# Patient Record
Sex: FEMALE | Race: WHITE | NOT HISPANIC OR LATINO | Employment: OTHER | URBAN - METROPOLITAN AREA
[De-identification: names, ages, dates, MRNs, and addresses within clinical notes are randomized per-mention and may not be internally consistent; named-entity substitution may affect disease eponyms.]

---

## 2017-05-24 ENCOUNTER — TRANSCRIBE ORDERS (OUTPATIENT)
Dept: ADMINISTRATIVE | Facility: HOSPITAL | Age: 62
End: 2017-05-24

## 2017-05-24 ENCOUNTER — ALLSCRIPTS OFFICE VISIT (OUTPATIENT)
Dept: OTHER | Facility: OTHER | Age: 62
End: 2017-05-24

## 2017-05-24 DIAGNOSIS — C50.411 MALIGNANT NEOPLASM OF UPPER-OUTER QUADRANT OF RIGHT FEMALE BREAST (HCC): Primary | ICD-10-CM

## 2017-05-24 DIAGNOSIS — M54.40 ACUTE RIGHT-SIDED LOW BACK PAIN WITH SCIATICA, SCIATICA LATERALITY UNSPECIFIED: ICD-10-CM

## 2017-05-24 DIAGNOSIS — E04.1 NONTOXIC UNINODULAR GOITER: ICD-10-CM

## 2017-06-02 ENCOUNTER — HOSPITAL ENCOUNTER (OUTPATIENT)
Dept: MRI IMAGING | Facility: HOSPITAL | Age: 62
Discharge: HOME | End: 2017-06-02
Attending: INTERNAL MEDICINE
Payer: COMMERCIAL

## 2017-06-02 DIAGNOSIS — C50.411 MALIGNANT NEOPLASM OF UPPER-OUTER QUADRANT OF RIGHT FEMALE BREAST (HCC): ICD-10-CM

## 2017-06-02 DIAGNOSIS — M54.50 LOW BACK PAIN: ICD-10-CM

## 2017-06-02 PROCEDURE — 72158 MRI LUMBAR SPINE W/O & W/DYE: CPT

## 2017-06-02 PROCEDURE — A9585 GADOBUTROL INJECTION: HCPCS | Performed by: INTERNAL MEDICINE

## 2017-06-02 RX ADMIN — GADOBUTROL 6 ML: 604.72 INJECTION INTRAVENOUS at 19:55

## 2017-07-05 ENCOUNTER — GENERIC CONVERSION - ENCOUNTER (OUTPATIENT)
Dept: OTHER | Facility: OTHER | Age: 62
End: 2017-07-05

## 2017-11-13 DIAGNOSIS — C50.411 MALIGNANT NEOPLASM OF UPPER-OUTER QUADRANT OF RIGHT FEMALE BREAST (HCC): ICD-10-CM

## 2017-11-13 DIAGNOSIS — E04.1 NONTOXIC SINGLE THYROID NODULE: ICD-10-CM

## 2017-11-16 ENCOUNTER — HOSPITAL ENCOUNTER (OUTPATIENT)
Dept: ULTRASOUND IMAGING | Facility: HOSPITAL | Age: 62
Discharge: HOME | End: 2017-11-16
Attending: INTERNAL MEDICINE
Payer: COMMERCIAL

## 2017-11-16 ENCOUNTER — LAB CONVERSION - ENCOUNTER (OUTPATIENT)
Dept: OTHER | Facility: OTHER | Age: 62
End: 2017-11-16

## 2017-11-16 DIAGNOSIS — E04.1 NONTOXIC UNINODULAR GOITER: ICD-10-CM

## 2017-11-16 LAB — TSH SERPL DL<=0.05 MIU/L-ACNC: 2.49 MIU/L (ref 0.4–4.5)

## 2017-11-16 PROCEDURE — 76536 US EXAM OF HEAD AND NECK: CPT

## 2017-11-17 ENCOUNTER — LAB CONVERSION - ENCOUNTER (OUTPATIENT)
Dept: OTHER | Facility: OTHER | Age: 62
End: 2017-11-17

## 2017-11-17 LAB
A/G RATIO (HISTORICAL): 1.7 (CALC) (ref 1–2.5)
ALBUMIN SERPL BCP-MCNC: 4.2 G/DL (ref 3.6–5.1)
ALP SERPL-CCNC: 92 U/L (ref 33–130)
ALT SERPL W P-5'-P-CCNC: 16 U/L (ref 6–29)
AST SERPL W P-5'-P-CCNC: 16 U/L (ref 10–35)
BASOPHILS # BLD AUTO: 0.5 %
BASOPHILS # BLD AUTO: 33 CELLS/UL (ref 0–200)
BILIRUB SERPL-MCNC: 0.5 MG/DL (ref 0.2–1.2)
BUN SERPL-MCNC: 21 MG/DL (ref 7–25)
BUN/CREA RATIO (HISTORICAL): ABNORMAL (CALC) (ref 6–22)
CA 27.29 (HISTORICAL): 46 U/ML
CALCIUM SERPL-MCNC: 9.3 MG/DL (ref 8.6–10.4)
CHLORIDE SERPL-SCNC: 100 MMOL/L (ref 98–110)
CO2 SERPL-SCNC: 33 MMOL/L (ref 20–31)
CREAT SERPL-MCNC: 0.66 MG/DL (ref 0.5–0.99)
DEPRECATED RDW RBC AUTO: 13.1 % (ref 11–15)
EGFR AFRICAN AMERICAN (HISTORICAL): 110 ML/MIN/1.73M2
EGFR-AMERICAN CALC (HISTORICAL): 95 ML/MIN/1.73M2
EOSINOPHIL # BLD AUTO: 251 CELLS/UL (ref 15–500)
EOSINOPHIL # BLD AUTO: 3.8 %
GAMMA GLOBULIN (HISTORICAL): 2.5 G/DL (CALC) (ref 1.9–3.7)
GLUCOSE (HISTORICAL): 93 MG/DL (ref 65–99)
HCT VFR BLD AUTO: 39.8 % (ref 35–45)
HGB BLD-MCNC: 13.1 G/DL (ref 11.7–15.5)
LYMPHOCYTES # BLD AUTO: 1459 CELLS/UL (ref 850–3900)
LYMPHOCYTES # BLD AUTO: 22.1 %
MCH RBC QN AUTO: 27.9 PG (ref 27–33)
MCHC RBC AUTO-ENTMCNC: 32.9 G/DL (ref 32–36)
MCV RBC AUTO: 84.9 FL (ref 80–100)
MONOCYTES # BLD AUTO: 469 CELLS/UL (ref 200–950)
MONOCYTES (HISTORICAL): 7.1 %
NEUTROPHILS # BLD AUTO: 4389 CELLS/UL (ref 1500–7800)
NEUTROPHILS # BLD AUTO: 66.5 %
PLATELET # BLD AUTO: 261 THOUSAND/UL (ref 140–400)
PMV BLD AUTO: 10.7 FL (ref 7.5–12.5)
POTASSIUM SERPL-SCNC: 4.2 MMOL/L (ref 3.5–5.3)
RBC # BLD AUTO: 4.69 MILLION/UL (ref 3.8–5.1)
SODIUM SERPL-SCNC: 138 MMOL/L (ref 135–146)
T4 FREE SERPL-MCNC: 1.1 NG/DL (ref 0.8–1.8)
TOTAL PROTEIN (HISTORICAL): 6.7 G/DL (ref 6.1–8.1)
TSH SERPL DL<=0.05 MIU/L-ACNC: 2.49 MIU/L (ref 0.4–4.5)
WBC # BLD AUTO: 6.6 THOUSAND/UL (ref 3.8–10.8)

## 2017-11-21 ENCOUNTER — TRANSCRIBE ORDERS (OUTPATIENT)
Dept: ADMINISTRATIVE | Facility: HOSPITAL | Age: 62
End: 2017-11-21

## 2017-11-21 ENCOUNTER — ALLSCRIPTS OFFICE VISIT (OUTPATIENT)
Dept: OTHER | Facility: OTHER | Age: 62
End: 2017-11-21

## 2017-11-21 DIAGNOSIS — C50.411 MALIGNANT NEOPLASM OF UPPER-OUTER QUADRANT OF RIGHT FEMALE BREAST, UNSPECIFIED ESTROGEN RECEPTOR STATUS (HCC): Primary | ICD-10-CM

## 2017-11-22 NOTE — PROGRESS NOTES
Assessment    1  Malignant neoplasm of upper-outer quadrant of right female breast (174 4) (C50 411)   2  Lung nodules (793 19) (R91 8)   3  Thyroid disorder (246 9) (E07 9)    Plan  Malignant neoplasm of upper-outer quadrant of right female breast    · We recommend that you examine your own breasts for lumps and other changes  ;Status:Complete;   Done: [id number]   Ordered; For:Malignant neoplasm of upper-outer quadrant of right female breast; Ordered By:Proothi, Reggie;   · (1) CA 27 29; Status:Active; Requested for:01May2018; Perform:[facility]; VQL:[id number];QGUNGPQ; For:Malignant neoplasm of upper-outer quadrant of right female breast; Ordered By:Proothi, Reggie;   · (1) CBC/PLT/DIFF; Status:Active; Requested for:01May2018; Perform:[facility]; JTY:[id number];VOHEXXO; For:Malignant neoplasm of upper-outer quadrant of right female breast; Ordered By:Proothi, Reggie;   · (1) COMPREHENSIVE METABOLIC PANEL; Status:Active; Requested for:01May2018; Perform:[facility]; HZP:[id number];YXZXLLD;YCDVEYEX of upper-outer quadrant of right female breast; Ordered By:Proothi, Reggie;   · * CT CHEST WO CONTRAST; Status:Need Information - Financial Authorization; Requested for:14May2018 07:45AM;    Perform:[facility]; RTP:[id number]; Last Updated By:Jennifer Hankins; 11/21/2017 8:43:04 AM;Ordered;neoplasm of upper-outer quadrant of right female breast; Ordered By:Proothi, Reggie;   · Follow-up visit in 6 months Evaluation and Treatment  Follow-up  Status: Complete Done: [id number] 08:00AM   Ordered;Malignant neoplasm of upper-outer quadrant of right female breast; Ordered By: [place] Performed:  Due: 41WGS7916; Last Updated By: Cathryn Kraus; 11/21/2017 8:37:18 AM    Discussion/Summary  Discussion Summary: In 2006 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, 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   Patient had right mastectomy and left breast reduction surgery  She did not require adjuvant therapy  She has been running slightly higher than normal tumor marker CA 27 29 level is fluctuating  The highest was 61 in December 2014  This time it is 46  Clinically there is no evidence of metastatic disease  She has stable thyroid nodules and they're being monitored by ultrasound  She had ultrasound of thyroid last week and that report is pending  She has small stable lung nodules being monitored by CT scan and there has not been any change since 2014  Largest nodule is 5 mm  She is a nonsmoker  states she had echocardiogram and stress test recently and she did fine  She also states she had colonoscopy recently and there was no cancer  states she does not have low back pain anymore  Kaitlyn Jack Has some tiredness, minor arthralgia and some anxiety  examination and test results are as recorded and discussed  Clinically stable  Plan is surveillance  Condition discussed and explained  Questions answered  Patient agrees with the treatment plan  Patient has follow-up appointment  discussed eating healthy foods and exercises as tolerated  Counseling Documentation With Imm: The patient was counseled regarding diagnostic results,-- prognosis,-- patient and family education,-- impressions  total time of encounter was 30 minutes-- and-- 20 minutes was spent counseling  Goals and Barriers: The patient has the current Goals: Cure from breast cancer  The patent has the current Barriers: No barriers  Patient's Capacity to Self-Care: Patient is able to Self-Care  Medication SE Review and Pt Understands Tx: The treatment plan was reviewed with the patient/guardian  The patient/guardian understands and agrees with the treatment plan   Self Referrals:   Self Referrals: No   Understands and agrees with treatment plan: The treatment plan was reviewed with the patient/guardian   The patient/guardian understands and agrees with the treatment plan      Chief Complaint  Chief Complaint: Chief Complaint:  The patient presents to the office today with breast cancer  History of Present Illness  HPI: In 2006 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, 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  Patient had right mastectomy and left breast reduction surgery  She did not require adjuvant therapy  She has been running slightly higher than normal tumor marker CA 27 29 level is fluctuating  The highest was 61 in December 2014  This time it is 46  Clinically there is no evidence of metastatic disease  She has stable thyroid nodules and they're being monitored by ultrasound  She had ultrasound of thyroid last week and that report is pending  She has small stable lung nodules being monitored by CT scan and there has not been any change since 2014  Largest nodule is 5 mm  She is a nonsmoker  states she had echocardiogram and stress test recently and she did fine  She also states she had colonoscopy recently and there was no cancer  states she does not have low back pain anymore  Emory Viveros Has some tiredness, minor arthralgia and some anxiety  Review of Systems        Reviewed 14 systems  No new symptoms  Other symptoms as in history of present illness  Complete-Female:  Constitutional: feeling tired, but-- no fever,-- not feeling poorly,-- no chills-- and-- no recent weight loss  Eyes: No complaints of eye pain, no red eyes, no eyesight problems, no discharge, no dry eyes, no itching of eyes  ENT: no complaints of earache, no loss of hearing, no nose bleeds, no nasal discharge, no sore throat, no hoarseness  Cardiovascular: No complaints of slow heart rate, no fast heart rate, no chest pain, no palpitations, no leg claudication, no lower extremity edema  Respiratory: No complaints of shortness of breath, no wheezing, no cough, no SOB on exertion, no orthopnea, no PND    Gastrointestinal: No complaints of abdominal pain, no constipation, no nausea or vomiting, no diarrhea, no bloody stools  Genitourinary: No complaints of dysuria, no incontinence, no pelvic pain, no dysmenorrhea, no vaginal discharge or bleeding  Musculoskeletal: joint stiffness, but-- no arthralgias,-- no joint swelling,-- no limb pain,-- no myalgias-- and-- no limb swelling  Integumentary: No complaints of skin rash or lesions, no itching, no skin wounds, no breast pain or lump  Neurological: No complaints of headache, no confusion, no convulsions, no numbness, no dizziness or fainting, no tingling, no limb weakness, no difficulty walking  Psychiatric: anxiety, but-- no personality change,-- no sleep disturbances,-- no depression-- and-- no emotional problems  Endocrine: No complaints of proptosis, no hot flashes, no muscle weakness, no deepening of the voice, no feelings of weakness  Hematologic/Lymphatic: No complaints of swollen glands, no swollen glands in the neck, does not bleed easily, does not bruise easily  ROS Reviewed:   ROS reviewed  Active Problems    1  Cancer of breast, female (174 9) (C50 919)   2  Low back pain (724 2) (M54 5)   3  Lung nodules (793 19) (R91 8)   4  Malignant neoplasm of upper-outer quadrant of right female breast (174 4) (C50 411)   5  Rectal bleeding (569 3) (K62 5)   6  Thyroid disorder (246 9) (E07 9)   7  Thyroid nodule (241 0) (E04 1)   8  Vitamin D deficiency (268 9) (E55 9)    Past Medical History            Reviewed  No change  Surgical History  Surgical History Reviewed: The surgical history was reviewed and updated today  Family History  Family History    1  No pertinent family history  Family History Reviewed: The family history was reviewed and updated today  Social History     · Never A Smoker  Social History Reviewed: The social history was reviewed and is unchanged  Current Meds   1  Aspirin 81 MG Oral Tablet Chewable; Therapy: (Recorded:19Sep2012) to Recorded   2  Mohan-Mag TABS; Therapy: (Recorded:19Sep2012) to Recorded   3  Curcumin Powder Recorded   4  [employer] Fish Oil CAPS; Therapy: (Recorded:28Dzs1409) to Recorded   5  Multi Complete CAPS; Therapy: (Recorded:53Tpd7664) to Recorded   6  Omega-3 Krill Oil CAPS; Therapy: (Recorded:18Hqz5785) to Recorded   7  Prepopik 10-3 5-12 MG-GM-GM Oral Packet; Therapy: 69BPM6917 to (Evaluate:91Oka1332) Recorded   8  Probiotic Product TABS; Therapy: (Recorded:74Nzj4758) to Recorded   9  Vitamin D 1000 UNIT CAPS; Therapy: (Recorded:19Iwn0246) to Recorded    Allergies    1  No Known Drug Allergies     Reviewed   Vitals  Vital Signs    Recorded: 21Nov2017 08:23AM   Temperature 97 8 F   Heart Rate 99   Respiration 16   Systolic 668   Diastolic 78   Height 5 ft 4 in   Weight 141 lb    BMI Calculated 24 2   BSA Calculated 1 69   O2 Saturation 99           Stable   Physical Exam                              Patient is alert and oriented  She is not in distress     Vital signs are stable  No icterus  No oral thrush  No sores in the mouth     No palpable neck mass  Lung fields are clear to percussion and auscultation  Heart rate is regular  Abdomen soft and nontender  No palpable abdominal mass  No ascites  No edema of ankles  No calf tenderness  No focal neurological deficit  No skin rash     She  goes to her breast cancer surgeon for examination of breasts and imaging studies  She also goes to her gynecologist   No lymphedema  No palpable lymphadenopathy  Good arterial pulses  Performance status 0  No significant change in examination     ECOG 0       Results/Data  (1) CBC/PLT/DIFF [id number] 08:43AM Proothi, Reggie     Test Name Result Flag Reference   WHITE BLOOD CELL COUNT 6 6 Thousand/uL  3 8-10 8   RED BLOOD CELL COUNT 4 69 Million/uL  3 80-5 10   HEMOGLOBIN 13 1 g/dL  11 7-15 5   HEMATOCRIT 39 8 %  35 0-45 0   MCV 84 9 fL  80 0-100 0   MCH 27 9 pg  27 0-33 0   MCHC 32 9 g/dL  32 0-36 0   RDW 13 1 %  11 0-15 0   PLATELET COUNT 626 Thousand/uL  140-400   ABSOLUTE NEUTROPHILS 4389 cells/uL  2982-2533 ABSOLUTE LYMPHOCYTES 1459 cells/uL  850-3900   ABSOLUTE MONOCYTES 469 cells/uL  200-950   ABSOLUTE EOSINOPHILS 251 cells/uL     ABSOLUTE BASOPHILS 33 cells/uL  0-200   NEUTROPHILS 66 5 %     LYMPHOCYTES 22 1 %     MONOCYTES 7 1 %     EOSINOPHILS 3 8 %     BASOPHILS 0 5 %     MPV 10 7 fL  7 5-12 5     (1) COMPREHENSIVE METABOLIC PANEL [id number] 62:13KY Proothi, Reggie     Test Name Result Flag Reference   GLUCOSE 93 mg/dL  65-99   Fasting reference interval   UREA NITROGEN (BUN) 21 mg/dL  7-25   CREATININE 0 66 mg/dL  0 50-0 99     For patients >52years of age, the reference limit for Creatinine is approximately 13% higher for people identified as -American  eGFR NON-AFR  AMERICAN 95 mL/min/1 73m2  > OR = 60   eGFR AFRICAN AMERICAN 110 mL/min/1 73m2  > OR = 60   BUN/CREATININE RATIO   5-83   NOT APPLICABLE (calc)   SODIUM 138 mmol/L  135-146   POTASSIUM 4 2 mmol/L  3 5-5 3   CHLORIDE 100 mmol/L     CARBON DIOXIDE 33 mmol/L H 20-31   CALCIUM 9 3 mg/dL  8 6-10 4   PROTEIN, TOTAL 6 7 g/dL  6 1-8 1   ALBUMIN 4 2 g/dL  3 6-5 1   GLOBULIN 2 5 g/dL (calc)  1 9-3 7   ALBUMIN/GLOBULIN RATIO 1 7 (calc)  1 0-2 5   BILIRUBIN, TOTAL 0 5 mg/dL  0 2-1 2   ALKALINE PHOSPHATASE 92 U/L     AST 16 U/L  10-35   ALT 16 U/L  6-29     (1) CA 27 29 [id number] 08:43AM Proothi, Reggie     REPORT COMMENT: FASTING:YES     Test Name Result Flag Reference   CA27 29 (CHIRON) 46 U/mL H <38     This test was performed using the Siemens          chemiluminescent method  Values obtained from different          assay methods cannot be used interchangeably  CA 27 29          levels, regardless of value, should not be interpreted as          absolute evidence of the presence or absence of disease       (1) T4, FREE [id number] 08:43AM Proothi, Reggie     Test Name Result Flag Reference   T4, FREE 1 1 ng/dL  0 8-1 8     (Q) TSH, 3RD GENERATION [id number] 08:43AM Proothi, Reggie     REPORT COMMENT: FASTING:YES     Test Name Result Flag Reference   TSH 2 49 mIU/L  0 40-4 50     Future Appointments    Date/Time Provider Specialty Site   05/22/2018 08:00 AM Anne Vieira MD Hematology Oncology [facility]       Signatures   Electronically signed by : Cristofer Salmeron MD; Nov 21 2017  1:59PM EST                       (Author)

## 2018-01-10 NOTE — PROGRESS NOTES
Assessment    1  Malignant neoplasm of upper-outer quadrant of right female breast (174 4) (C50 411)   2  Lung nodules (793 19) (R91 8)   3  Thyroid disorder (246 9) (E07 9)   4  Thyroid nodule (241 0) (E04 1)    Plan  Lung nodules    · * CT CHEST WO CONTRAST; Status:Need Information - Financial Authorization; Requested for:94Txh1155 09:45AM;    Perform:[facility]; WII:[id number]; Last Updated By:Yara Pickering; 2016 10:19:13 AM;Ordered; For:Lung nodules; Ordered By:Proothi Reggie;  Malignant neoplasm of upper-outer quadrant of right female breast    · We recommend that you examine your own breasts for lumps and other changes ;  Status:Complete;   Done: 34WBV2696   Ordered; For:Malignant neoplasm of upper-outer quadrant of right female breast; Ordered By:Proothi, Reggie;   · (1) CA 27 29; Status:Active; Requested XSQ:92SVG3873;    Perform:[facility]; RTV:18BBN7912; Last Updated By:Yara Pickering; 2016 10:06:29 AM;Ordered; For:Malignant neoplasm of upper-outer quadrant of right female breast; Ordered By:Proothi, Reggie;   · (1) CA 27 29; Status:Active; Requested for:15Uad7602;    Perform:[facility]; Due:33Ftp0571; Last Updated By:Yara Pickering; 2016 10:06:22 AM;Ordered; For:Malignant neoplasm of upper-outer quadrant of right female breast; Ordered By:Proothi, Reggie;   · (1) CA 27 29; Status:Active; Requested for:26Ixz6743;    Perform:[address]; FNQ:96LEN5341; Last Updated By:Yara Pickering; 2016 10:06:13 AM;Ordered; For:Malignant neoplasm of upper-outer quadrant of right female breast; Ordered By:Proothi, Reggie;   · (1) CA 27 29; Status:Active; Requested for:50Rzn1908;    Perform:[address]; [id number];UWNNZFF; For:Malignant neoplasm of upper-outer quadrant of right female breast; Ordered By:Reggie Vieira;   · (1) CA 27 29; Status:Complete;  Requested for:Recurring Schedule: 2016;  2016; 2016; 10/19/2016 ;    Perform:[facility]; JSR:50ZJW6485;RNKQVIP; For:Malignant neoplasm of upper-outer quadrant of right female breast; Ordered By:Reggie Vieira;   · Follow-up visit in 4 Months Evaluation and Treatment  Follow-up  Status: Complete   Done: 10KCL1623 11:45AM   Ordered; For: Malignant neoplasm of upper-outer quadrant of right female breast; Ordered By: Shawn Burroughs Performed:  Due: 52ZNB1797; Last Updated By: Joce Narayanan; 7/27/2016 10:09:33 AM  Thyroid nodule    · US THYROID; Status:Active; Requested for:74Ict4489 09:00AM;    Perform:[facility]; ABJ:98LJK8394; Last Updated By:Ras Pickering; 7/27/2016 10:17:37 AM;Ordered; For:Thyroid nodule; Ordered By:Reggie Vieira;    Discussion/Summary  Discussion Summary:   Follow-up visit for 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  Patient had right mastectomy and left breast reduction surgery in 2006  No adjuvant therapy  She has fluctuating tumor marker CA 27 29 and highest being 60 in December 2014  Last 2 times it was 38 and this time 41  patient was diagnosed to have hormone receptor negative stage I right breast cancer, DCIS with microinvasion  She has stable thyroid nodules  She follows with an endocrinologist  Two of the nodules were biopsied once and they were negative for cancer  She gets yearly ultrasound  On PET/CT scan one the nodule showed 8 2 FDG  She has stable lung nodules and they are being monitored    She is a nonsmoker  She has chronic dorsal spine discomfort  She had MRI scan of thoracic spine after PET/CT scan and that showed hemangiomas  Has some tiredness, minor arthralgia and some anxiety  No new symptoms  Reviewed 14 systems  Physical examination and test results are as recorded  Tumor marker has been fluctuating as above  She'll have tumor marker checked once a month for next 3 months   She'll have follow-up CT scan of chest and ultrasound of thyroid  She'll come back in 4 months  She had colonoscopy on 4/23/14  Also discussed self breast examination, eating healthy foods and exercises as tolerated  Understands and agrees with treatment plan: The treatment plan was reviewed with the patient/guardian  The patient/guardian understands and agrees with the treatment plan   Counseling Documentation With Imm: The patient was counseled regarding diagnostic results, prognosis, patient and family education, impressions  total time of encounter was 30 minutes and 20 minutes was spent counseling  Chief Complaint  Chief Complaint: Chief Complaint:   The patient presents to the office today with breast cancer  History of Present Illness  HPI: Follow-up visit for 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  Patient had right mastectomy and left breast reduction surgery in 2006  No adjuvant therapy  She has fluctuating tumor marker CA 27 29 and highest being 60 in December 2014  Last 2 times it was 38 and this time 41  patient was diagnosed to have hormone receptor negative stage I right breast cancer, DCIS with microinvasion  She has stable thyroid nodules  She follows with an endocrinologist  Two of the nodules were biopsied once and they were negative for cancer  She gets yearly ultrasound  On PET/CT scan one the nodule showed 8 2 FDG  She has stable lung nodules and they are being monitored    She is a nonsmoker  She has chronic dorsal spine discomfort  She had MRI scan of thoracic spine after PET/CT scan and that showed hemangiomas  Has some tiredness, minor arthralgia and some anxiety  No new symptoms  Reviewed 14 systems  Review of Systems       Reviewed 14 systems  No new symptoms  Other symptoms as in history of present illness  Complete-Female:   Constitutional: feeling tired, but no fever, not feeling poorly, no chills and no recent weight loss     Eyes: No complaints of eye pain, no red eyes, no eyesight problems, no discharge, no dry eyes, no itching of eyes  ENT: no complaints of earache, no loss of hearing, no nose bleeds, no nasal discharge, no sore throat, no hoarseness  Cardiovascular: No complaints of slow heart rate, no fast heart rate, no chest pain, no palpitations, no leg claudication, no lower extremity edema  Respiratory: No complaints of shortness of breath, no wheezing, no cough, no SOB on exertion, no orthopnea, no PND  Gastrointestinal: No complaints of abdominal pain, no constipation, no nausea or vomiting, no diarrhea, no bloody stools  Genitourinary: No complaints of dysuria, no incontinence, no pelvic pain, no dysmenorrhea, no vaginal discharge or bleeding  Musculoskeletal: joint stiffness, but no arthralgias, no joint swelling, no limb pain, no myalgias and no limb swelling  Integumentary: No complaints of skin rash or lesions, no itching, no skin wounds, no breast pain or lump  Neurological: No complaints of headache, no confusion, no convulsions, no numbness, no dizziness or fainting, no tingling, no limb weakness, no difficulty walking  Psychiatric: anxiety, but no personality change, no sleep disturbances, no depression and no emotional problems  Endocrine: No complaints of proptosis, no hot flashes, no muscle weakness, no deepening of the voice, no feelings of weakness  Hematologic/Lymphatic: No complaints of swollen glands, no swollen glands in the neck, does not bleed easily, does not bruise easily  ROS Reviewed:   ROS reviewed  Active Problems    1  Cancer of breast, female (174 9) (C50 919)   2  Lung nodules (793 19) (R91 8)   3  Malignant neoplasm of upper-outer quadrant of right female breast (174 4) (C50 411)   4  Rectal bleeding (569 3) (K62 5)   5  Thyroid disorder (246 9) (E07 9)   6  Thyroid nodule (241 0) (E04 1)   7  Vitamin D deficiency (268 9) (E55 9)    Past Medical History               Reviewed  No change       Surgical History  Surgical History Reviewed: The surgical history was reviewed and updated today  Family History  Family History    1  No pertinent family history  Family History Reviewed: The family history was reviewed and updated today  Social History    · Never A Smoker  Social History Reviewed: The social history was reviewed and is unchanged  Current Meds   1  Aspirin 81 MG Oral Tablet Chewable; Therapy: (Recorded:44Eln8127) to Recorded   2  Mohan-Mag TABS; Therapy: (Recorded:17Zdx2454) to Recorded   3  Curcumin Powder Recorded   4  [employer] Fish Oil CAPS; Therapy: (Recorded:95Rku5431) to Recorded   5  Multi Complete CAPS; Therapy: (Recorded:64Yuj0355) to Recorded   6  Omega-3 Krill Oil CAPS; Therapy: (Recorded:51Alq2252) to Recorded   7  Prepopik 10-3 5-12 MG-GM-GM Oral Packet; Therapy: 36COL9637 to (Evaluate:92Jqd6810) Recorded   8  Probiotic Product TABS; Therapy: (Recorded:37Iyf1086) to Recorded   9  Vitamin D 1000 UNIT CAPS; Therapy: (Recorded:12Lva6520) to Recorded  Medication List Reviewed: The medication list was reviewed and updated today  Allergies    1  No Known Drug Allergies    Reviewed     Vitals  Vital Signs    Recorded: 76RVJ6297 86:47OM   Systolic 373   Diastolic 80   Heart Rate 90   Respiration 16   Temperature 98 3 F   Pain Scale 0   O2 Saturation 100   Height 5 ft 4 in   Weight 141 lb 8 0 oz   BMI Calculated 24 29   BSA Calculated 1 7          Stable     Physical Exam                             Patient is alert and oriented and not in any acute distress  Stable vitals  No icterus  No oral thrush  No palpable neck mass  Lung fields are clear to percussion and auscultation  Heart rate is regular  Abdomen soft and nontender  No palpable abdominal mass  No ascites  No edema of ankles  No calf tenderness  No focal neurological deficit  No skin rash     She will go to Dr Lew Thakur for examination of breasts and imaging studies  She also goes to her gynecologist   No lymphedema  No palpable lymphadenopathy  Good arterial pulses  Performance status 0  No significant change in examination  ECOG 0       Results/Data  (1) COMPREHENSIVE METABOLIC PANEL 95HET9091 09:38[facility]     Test Name Result Flag Reference   GLUCOSE 89 mg/dL  65-99   Fasting reference interval   UREA NITROGEN (BUN) 15 mg/dL  7-25   CREATININE 0 57 mg/dL  0 50-0 99   For patients >52years of age, the reference limit  for Creatinine is approximately 13% higher for people  identified as -American  eGFR NON-AFR   AMERICAN 101 mL/min/1 73m2  > OR = 60   eGFR AFRICAN AMERICAN 117 mL/min/1 73m2  > OR = 60   BUN/CREATININE RATIO   1-23   NOT APPLICABLE (calc)   SODIUM 135 mmol/L  135-146   POTASSIUM 4 3 mmol/L  3 5-5 3   CHLORIDE 98 mmol/L     CARBON DIOXIDE 28 mmol/L  19-30   CALCIUM 9 3 mg/dL  8 6-10 4   PROTEIN, TOTAL 6 7 g/dL  6 1-8 1   ALBUMIN 4 2 g/dL  3 6-5 1   GLOBULIN 2 5 g/dL (calc)  1 9-3 7   ALBUMIN/GLOBULIN RATIO 1 7 (calc)  1 0-2 5   BILIRUBIN, TOTAL 0 4 mg/dL  0 2-1 2   ALKALINE PHOSPHATASE 93 U/L     AST 17 U/L  10-35   ALT 18 U/L  6-29     (1) CBC/PLT/DIFF 36ZMN0869 07:57AM [facility]   REPORT COMMENT:  FASTING:YES     Test Name Result Flag Reference   WHITE BLOOD CELL COUNT 5 3 Thousand/uL  3 8-10 8   RED BLOOD CELL COUNT 4 71 Million/uL  3 80-5 10   HEMOGLOBIN 13 1 g/dL  11 7-15 5   HEMATOCRIT 40 7 %  35 0-45 0   MCV 86 2 fL  80 0-100 0   MCH 27 9 pg  27 0-33 0   MCHC 32 3 g/dL  32 0-36 0   RDW 13 8 %  11 0-15 0   PLATELET COUNT 183 Thousand/uL  140-400   MPV 9 4 fL  7 5-11 5   ABSOLUTE NEUTROPHILS 3244 cells/uL  2426-7320   ABSOLUTE LYMPHOCYTES 1436 cells/uL  850-3900   ABSOLUTE MONOCYTES 376 cells/uL  200-950   ABSOLUTE EOSINOPHILS 228 cells/uL     ABSOLUTE BASOPHILS 16 cells/uL  0-200   NEUTROPHILS 61 2 %     LYMPHOCYTES 27 1 %     MONOCYTES 7 1 %     EOSINOPHILS 4 3 %     BASOPHILS 0 3 %       (1) COMPREHENSIVE METABOLIC PANEL [id number] 76:40JI Proothi, Reggie     Test Name Result Flag Reference   GLUCOSE 89 mg/dL  65-99   Fasting reference interval   UREA NITROGEN (BUN) 15 mg/dL  7-25   CREATININE 0 57 mg/dL  0 50-0 99   For patients >52years of age, the reference limit  for Creatinine is approximately 13% higher for people  identified as -American  eGFR NON-AFR  AMERICAN 101 mL/min/1 73m2  > OR = 60   eGFR AFRICAN AMERICAN 117 mL/min/1 73m2  > OR = 60   BUN/CREATININE RATIO   6-53   NOT APPLICABLE (calc)   SODIUM 135 mmol/L  135-146   POTASSIUM 4 3 mmol/L  3 5-5 3   CHLORIDE 98 mmol/L     CARBON DIOXIDE 28 mmol/L  19-30   CALCIUM 9 3 mg/dL  8 6-10 4   PROTEIN, TOTAL 6 7 g/dL  6 1-8 1   ALBUMIN 4 2 g/dL  3 6-5 1   GLOBULIN 2 5 g/dL (calc)  1 9-3 7   ALBUMIN/GLOBULIN RATIO 1 7 (calc)  1 0-2 5   BILIRUBIN, TOTAL 0 4 mg/dL  0 2-1 2   ALKALINE PHOSPHATASE 93 U/L     AST 17 U/L  10-35   ALT 18 U/L  6-29     (1) CBC/PLT/DIFF [id number] 07:57AM Proothi, Reggie     Test Name Result Flag Reference   WHITE BLOOD CELL COUNT 5 3 Thousand/uL  3 8-10 8   RED BLOOD CELL COUNT 4 71 Million/uL  3 80-5 10   HEMOGLOBIN 13 1 g/dL  11 7-15 5   HEMATOCRIT 40 7 %  35 0-45 0   MCV 86 2 fL  80 0-100 0   MCH 27 9 pg  27 0-33 0   MCHC 32 3 g/dL  32 0-36 0   RDW 13 8 %  11 0-15 0   PLATELET COUNT 911 Thousand/uL  140-400   MPV 9 4 fL  7 5-11 5   ABSOLUTE NEUTROPHILS 3244 cells/uL  3720-6503   ABSOLUTE LYMPHOCYTES 1436 cells/uL  850-3900   ABSOLUTE MONOCYTES 376 cells/uL  200-950   ABSOLUTE EOSINOPHILS 228 cells/uL     ABSOLUTE BASOPHILS 16 cells/uL  0-200   NEUTROPHILS 61 2 %     LYMPHOCYTES 27 1 %     MONOCYTES 7 1 %     EOSINOPHILS 4 3 %     BASOPHILS 0 3 %       (1) CA 27 29 [id number] 07:57AM Proothi, Reggie   REPORT COMMENT:  FASTING:YES     Test Name Result Flag Reference   CA27 29 (CHIRON) 41 U/mL H <38   This test was performed using the Siemens           chemiluminescent method   Values obtained from different           assay methods cannot be used interchangeably  CA 27 29           levels, regardless of value, should not be interpreted as           absolute evidence of the presence or absence of disease  * CT Thorax Chest Without Contrast [id number] 07:43AM Proothi, Reggie     Test Name Result Flag Reference   CT Thorax W/O (Report)     [address];;[place];PA;[address]   11/16/2015 0989   11/16/2015 0808       CT CHEST WITHOUT IV CONTRAST     INDICATION- Follow-up lung nodules  History of breast carcinoma  COMPARISON- 5/23/2015     TECHNIQUE- CT examination of the chest was performed without   intravenous contrast  Axial, sagittal and coronal reformatted images   were submitted for interpretation  Coronal thick section MIP (maximal   intensity projection) images were also created  Examination was   performed utilizing techniques to minimize radiation dose, including   the use of dose reduction software  FINDINGS-     LUNGS- 3 mm right upper lobe pulmonary nodule, series 3 image 21 is   unchanged from the previous exam  Stable 5 mm nodule within the   superior aspect of the left lower lobe  No pneumonia  PLEURA- Unremarkable  HEART/GREAT VESSELS- Unremarkable for patient's age  MEDIASTINUM AND FANNY- Paratracheal mass noted within the neck   extending inferiorly into the upper mediastinum resulting in mild   tracheal narrowing suggesting thyroid goiter  Irregular calcified   nodule within the right paramedian isthmus measuring 1 4 cm is   unchanged  Trachea is slightly displaced towards the left  CHEST WALL AND LOWER NECK- Prior right breast implant  VISUALIZED STRUCTURES IN THE UPPER ABDOMEN- Unremarkable  OSSEOUS STRUCTURES- No acute fracture  No destructive osseous lesion  IMPRESSION-        1  Stable small pulmonary nodules, superior aspect of the left lower   lobe and within the right upper lobe  No new pulmonary nodules are   noted  Clear lung fields     2  Stable thyroid enlargement extending inferiorly into the upper   mediastinum possibly representing goiter  1 4 cm calcified nodule to   the right of midline  Mild tracheal narrowing and slight tracheal   displacement towards the left, stable         Transcribed on- [id number]     Fred THAPA [address], RAD DO   [place] Radiologist- [address], [place] DO   Electronically [address] [facility], RAD DO   Released Date Time- 11/16/15 1516   ------------------------------------------------------------------------------   9381^MAUREEN WASHBURN   9381^MAUREEN WASHBURN     Future Appointments    Date/Time Provider Specialty Site   11/23/2016 11:45 AM Dudley Vieira MD Hematology Oncology [facility]     Signatures   Electronically signed by : Makenna Shannon MD; Jul 27 2016  6:40PM EST                       (Author)

## 2018-01-12 VITALS
TEMPERATURE: 97.8 F | HEART RATE: 99 BPM | RESPIRATION RATE: 16 BRPM | BODY MASS INDEX: 24.07 KG/M2 | DIASTOLIC BLOOD PRESSURE: 78 MMHG | SYSTOLIC BLOOD PRESSURE: 122 MMHG | HEIGHT: 64 IN | OXYGEN SATURATION: 99 % | WEIGHT: 141 LBS

## 2018-01-12 VITALS
DIASTOLIC BLOOD PRESSURE: 70 MMHG | HEART RATE: 81 BPM | SYSTOLIC BLOOD PRESSURE: 134 MMHG | RESPIRATION RATE: 15 BRPM | WEIGHT: 143.13 LBS | OXYGEN SATURATION: 97 % | TEMPERATURE: 97.9 F | HEIGHT: 64 IN | BODY MASS INDEX: 24.43 KG/M2

## 2018-01-12 NOTE — MISCELLANEOUS
Message   Recorded as Task   Date: 07/05/2017 09:59 AM, Created By: Jason Moreno   Task Name: Miscellaneous   Assigned To: Estefani Arevalo   Regarding Patient: Jeanne Mercado, Status: Active   Afsanehmanjula Méndez - 05 Jul 2017 9:59 AM     TASK CREATED  Patient called for results of MRI done June 2,2017  Results given   Also requested copy be sent to her, copy was sent  Noted  Active Problems    1  Cancer of breast, female (174 9) (C50 919)   2  Low back pain (724 2) (M54 5)   3  Lung nodules (793 19) (R91 8)   4  Malignant neoplasm of upper-outer quadrant of right female breast (174 4) (C50 411)   5  Rectal bleeding (569 3) (K62 5)   6  Thyroid disorder (246 9) (E07 9)   7  Thyroid nodule (241 0) (E04 1)   8  Vitamin D deficiency (268 9) (E55 9)    Current Meds   1  Aspirin 81 MG Oral Tablet Chewable; Therapy: (Recorded:77Scw6269) to Recorded   2  Mohan-Mag TABS; Therapy: (Recorded:15Bjm2555) to Recorded   3  Curcumin Powder Recorded   4  [employer] Fish Oil CAPS; Therapy: (Recorded:49Rlp4406) to Recorded   5  Multi Complete CAPS; Therapy: (Recorded:11Ssk5657) to Recorded   6  Omega-3 Krill Oil CAPS; Therapy: (Recorded:77Syj9652) to Recorded   7  Prepopik 10-3 5-12 MG-GM-GM Oral Packet; Therapy: 62ZBW6094 to (Evaluate:36Iyt6385) Recorded   8  Probiotic Product TABS; Therapy: (Recorded:56Nwx7073) to Recorded   9  Vitamin D 1000 UNIT CAPS; Therapy: (Recorded:62Yuj5574) to Recorded    Allergies    1   No Known Drug Allergies    Signatures   Electronically signed by : Mercedez Chatman RN; Jul 5 2017 10:03AM EST                       (Author)

## 2018-05-01 DIAGNOSIS — C50.411 MALIGNANT NEOPLASM OF UPPER-OUTER QUADRANT OF RIGHT FEMALE BREAST (HCC): ICD-10-CM

## 2018-05-04 LAB
ALBUMIN SERPL-MCNC: 4.1 G/DL (ref 3.6–5.1)
ALBUMIN/GLOB SERPL: 1.6 (CALC) (ref 1–2.5)
ALP SERPL-CCNC: 78 U/L (ref 33–130)
ALT SERPL-CCNC: 15 U/L (ref 6–29)
AST SERPL-CCNC: 16 U/L (ref 10–35)
BASOPHILS # BLD AUTO: 17 CELLS/UL (ref 0–200)
BASOPHILS NFR BLD AUTO: 0.3 %
BILIRUB SERPL-MCNC: 0.4 MG/DL (ref 0.2–1.2)
BUN SERPL-MCNC: 21 MG/DL (ref 7–25)
BUN/CREAT SERPL: NORMAL (CALC) (ref 6–22)
CALCIUM SERPL-MCNC: 9.3 MG/DL (ref 8.6–10.4)
CHLORIDE SERPL-SCNC: 102 MMOL/L (ref 98–110)
CO2 SERPL-SCNC: 26 MMOL/L (ref 20–31)
CREAT SERPL-MCNC: 0.74 MG/DL (ref 0.5–0.99)
EOSINOPHIL # BLD AUTO: 239 CELLS/UL (ref 15–500)
EOSINOPHIL NFR BLD AUTO: 4.2 %
ERYTHROCYTE [DISTWIDTH] IN BLOOD BY AUTOMATED COUNT: 12.5 % (ref 11–15)
GLOBULIN SER CALC-MCNC: 2.5 G/DL (CALC) (ref 1.9–3.7)
GLUCOSE SERPL-MCNC: 93 MG/DL (ref 65–99)
HCT VFR BLD AUTO: 40 % (ref 35–45)
HGB BLD-MCNC: 13 G/DL (ref 11.7–15.5)
LYMPHOCYTES # BLD AUTO: 1402 CELLS/UL (ref 850–3900)
LYMPHOCYTES NFR BLD AUTO: 24.6 %
MCH RBC QN AUTO: 28.3 PG (ref 27–33)
MCHC RBC AUTO-ENTMCNC: 32.5 G/DL (ref 32–36)
MCV RBC AUTO: 87 FL (ref 80–100)
MONOCYTES # BLD AUTO: 564 CELLS/UL (ref 200–950)
MONOCYTES NFR BLD AUTO: 9.9 %
NEUTROPHILS # BLD AUTO: 3477 CELLS/UL (ref 1500–7800)
NEUTROPHILS NFR BLD AUTO: 61 %
PLATELET # BLD AUTO: 257 THOUSAND/UL (ref 140–400)
PMV BLD REES-ECKER: 10.1 FL (ref 7.5–12.5)
POTASSIUM SERPL-SCNC: 4.3 MMOL/L (ref 3.5–5.3)
PROT SERPL-MCNC: 6.6 G/DL (ref 6.1–8.1)
RBC # BLD AUTO: 4.6 MILLION/UL (ref 3.8–5.1)
SL AMB CA27.29 (CHIRON): 39 U/ML
SL AMB EGFR AFRICAN AMERICAN: 101 ML/MIN/1.73M2
SL AMB EGFR NON AFRICAN AMERICAN: 87 ML/MIN/1.73M2
SODIUM SERPL-SCNC: 138 MMOL/L (ref 135–146)
WBC # BLD AUTO: 5.7 THOUSAND/UL (ref 3.8–10.8)

## 2018-05-22 ENCOUNTER — OFFICE VISIT (OUTPATIENT)
Dept: HEMATOLOGY ONCOLOGY | Facility: CLINIC | Age: 63
End: 2018-05-22
Payer: COMMERCIAL

## 2018-05-22 VITALS
BODY MASS INDEX: 24.59 KG/M2 | RESPIRATION RATE: 16 BRPM | HEART RATE: 83 BPM | DIASTOLIC BLOOD PRESSURE: 78 MMHG | SYSTOLIC BLOOD PRESSURE: 136 MMHG | TEMPERATURE: 98.8 F | HEIGHT: 64 IN | OXYGEN SATURATION: 99 % | WEIGHT: 144 LBS

## 2018-05-22 DIAGNOSIS — Z85.3 HISTORY OF RIGHT BREAST CANCER: Primary | ICD-10-CM

## 2018-05-22 DIAGNOSIS — E04.1 THYROID NODULE: ICD-10-CM

## 2018-05-22 DIAGNOSIS — R91.8 LUNG NODULES: ICD-10-CM

## 2018-05-22 DIAGNOSIS — R97.8 ABNORMAL TUMOR MARKERS: ICD-10-CM

## 2018-05-22 DIAGNOSIS — E55.9 VITAMIN D DEFICIENCY: ICD-10-CM

## 2018-05-22 PROCEDURE — 99214 OFFICE O/P EST MOD 30 MIN: CPT | Performed by: INTERNAL MEDICINE

## 2018-05-22 RX ORDER — CHLORHEXIDINE/GLYCERIN/HE-CELL
JELLY (GRAM) TOPICAL DAILY
COMMUNITY

## 2018-05-22 RX ORDER — ASCORBATE CALCIUM 500 MG
500 TABLET ORAL
COMMUNITY
End: 2018-10-24

## 2018-05-22 RX ORDER — UBIQUINOL 100 MG
300 CAPSULE ORAL DAILY
COMMUNITY

## 2018-05-22 RX ORDER — UBIDECARENONE 10 MG
CAPSULE ORAL
COMMUNITY
Start: 2012-04-20

## 2018-05-22 NOTE — PATIENT INSTRUCTIONS
Blood tests every 6 months CT scan of chest and ultrasound of thyroid in November    Visit in 1 year

## 2018-05-22 NOTE — PROGRESS NOTES
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  This was diagnosed in 2006  Patient had right mastectomy and left breast reduction surgery  She did not require adjuvant therapy  She has been running slightly higher than normal tumor marker CA 27 29 level and that has been  fluctuating  This time tumor marker is close to normal, 39, normal being up to 38  The highest was 61 in December 2014  Patient follows with her breast surgeon    History of stable thyroid nodules and they're being monitored by ultrasound  Also she sees her endocrinologist   She has small stable lung nodules being monitored by CT scan and there has not been any change since 2014  Largest nodule is 5 mm  She is a nonsmoker  History of vitamin-D deficiency and she has been taking vitamin-D and she wants level to be checked  Has some tiredness, minor arthralgia and some anxiety      Current Outpatient Prescriptions:     aspirin 81 MG tablet, Take 81 mg by mouth, Disp: , Rfl:     Calcium Ascorbate 500 MG TABS, Take 500 mg by mouth, Disp: , Rfl:     calcium carbonate 1250 MG capsule, Take by mouth, Disp: , Rfl:     Cholecalciferol 1000 units capsule, Take 1,000 Units by mouth, Disp: , Rfl:     Coenzyme Q10 10 MG capsule, Once daily, Disp: , Rfl:     Glucosamine 750 MG TABS, Take 300 mg by mouth, Disp: , Rfl:     Multiple Vitamins-Minerals (MULTI COMPLETE) CAPS, Take by mouth, Disp: , Rfl:     Omega-3 Fatty Acids ([employer] FISH OIL) 1000 MG CAPS, Take by mouth, Disp: , Rfl:     Probiotic Product (PROBIOTIC-10 PO), Take by mouth, Disp: , Rfl:     TURMERIC CURCUMIN PO, by Does not apply route, Disp: , Rfl:     Allergies   Allergen Reactions    Latex Other (See Comments)     rash    Other        ROS:  05/22/18 Reviewed 13 systems:  Presently no headaches, seizures, dizziness, diplopia, dysphagia, hoarseness, chest pain, palpitations, shortness of breath, cough, hemoptysis, abdominal pain, nausea, vomiting, change in bowel habits, melena, hematuria, fever, chills, bleeding, bone pains, skin rash, weight loss,   weakness, numbness,  claudication and gait problem  No frequent infections  Not unusually sensitive to heat or cold  No swelling of the ankles  No swollen glands  Patient is anxious  No GYN symptoms Other symptoms are in HPI        /78 (BP Location: Left arm, Cuff Size: Adult)   Pulse 83   Temp 98 8 °F (37 1 °C) (Tympanic)   Resp 16   Ht 5' 4" (1 626 m)   Wt 65 3 kg (144 lb)   SpO2 99%   BMI 24 72 kg/m²     Physical Exam:  Alert, oriented, not in distress, no icterus, no oral thrush, no palpable neck mass, clear lung fields, regular heart rate, abdomen  soft and non tender, no palpable abdominal mass, no ascites, no edema of ankles, no calf tenderness, no focal neurological deficit, no skin rash, no palpable lymphadenopathy, good arterial pulses, no clubbing  Patient is anxious  Performance status 1  No lymphedema    She goes to her breast surgeon for examination of breasts and imaging studies    IMAGING:  No orders to display       LABS:  Results for orders placed or performed in visit on 05/02/18   Comprehensive metabolic panel   Result Value Ref Range    SL AMB GLUCOSE 93 65 - 99 mg/dL    BUN 21 7 - 25 mg/dL    Creatinine, Serum 0 74 0 50 - 0 99 mg/dL    eGFR Non  87 > OR = 60 mL/min/1 73m2    SL AMB EGFR  101 > OR = 60 mL/min/1 73m2    SL AMB BUN/CREATININE RATIO NOT APPLICABLE 6 - 22 (calc)    SL AMB SODIUM 138 135 - 146 mmol/L    SL AMB POTASSIUM 4 3 3 5 - 5 3 mmol/L    SL AMB CHLORIDE 102 98 - 110 mmol/L    SL AMB CARBON DIOXIDE 26 20 - 31 mmol/L    SL AMB CALCIUM 9 3 8 6 - 10 4 mg/dL    SL AMB PROTEIN, TOTAL 6 6 6 1 - 8 1 g/dL    Serum Albumin 4 1 3 6 - 5 1 g/dL    SL AMB GLOBULIN 2 5 1 9 - 3 7 g/dL (calc)    SL AMB ALBUMIN/GLOBULIN RATIO 1 6 1 0 - 2 5 (calc)    SL AMB BILIRUBIN, TOTAL 0 4 0 2 - 1 2 mg/dL    SL AMB ALKALINE PHOSPHATASE 78 33 - 130 U/L    SL AMB AST 16 10 - 35 U/L    SL AMB ALT 15 6 - 29 U/L   CBC and differential   Result Value Ref Range    SL AMB LAB WHITE BLOOD CELL COUNT 5 7 3 8 - 10 8 Thousand/uL    SL AMB LAB RED BLOOD CELLS 4 60 3 80 - 5 10 Million/uL    Hemoglobin 13 0 11 7 - 15 5 g/dL    Hematocrit 40 0 35 0 - 45 0 %    MCV 87 0 80 0 - 100 0 fL    MCH 28 3 27 0 - 33 0 pg    MCHC 32 5 32 0 - 36 0 g/dL    RDW 12 5 11 0 - 15 0 %    Platelet Count 095 023 - 400 Thousand/uL    SL AMB MPV 10 1 7 5 - 12 5 fL    Neutrophils (Absolute) 3,477 1,500 - 7,800 cells/uL    Lymphocytes (Absolute) 1,402 850 - 3,900 cells/uL    Monocytes (Absolute) 564 200 - 950 cells/uL    Eosinophils (Absolute) 239 15 - 500 cells/uL    Basophils (Absolute) 17 0 - 200 cells/uL    Neutrophils 61 %    Lymphocytes 24 6 %    Monocytes 9 9 %    Eosinophils 4 2 %    Basophils 0 3 %   Cancer antigen 27 29   Result Value Ref Range    CA27 29 (CHIRON) 39 (H) <38 U/mL     Labs, Imaging, & Other studies:   All pertinent labs and imaging studies were personally reviewed    Lab Results   Component Value Date     11/15/2017    K 4 2 11/15/2017     11/15/2017    CO2 33 (H) 11/15/2017    BUN 21 05/02/2018    CREATININE 0 74 05/02/2018    CALCIUM 9 3 05/02/2018    AST 16 11/15/2017    ALT 16 11/15/2017    ALKPHOS 92 11/15/2017    PROT 6 7 11/15/2017    BILITOT 0 5 11/15/2017     Lab Results   Component Value Date    WBC 6 6 11/15/2017    HGB 13 0 05/02/2018    HCT 40 0 05/02/2018    MCV 87 0 05/02/2018     05/02/2018   No results found for: SEDRATE    Reviewed and discussed with patient  Assessment and plan:  Clover Martin is a 59 y o  female with  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, stage I , 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  This was diagnosed in 2006  Patient had right mastectomy and left breast reduction surgery  She did not require adjuvant therapy   She has been running slightly higher than normal tumor marker CA 27 29 level and that has been  fluctuating  This time tumor marker is close to normal, 39, normal being up to 38  The highest was 61 in December 2014  Patient follows with her breast surgeon    History of stable thyroid nodules and they're being monitored by ultrasound  Also she sees her endocrinologist   She has small stable lung nodules being monitored by CT scan and there has not been any change since 2014  Largest nodule is 5 mm  She is a nonsmoker  History of vitamin-D deficiency and she has been taking vitamin-D and she wants level to be checked  Has some tiredness, minor arthralgia and some anxiety     Physical examination and test results are as recorded and discussed  Breast cancer is in remission  Stable lung nodules and thyroid nodule  Monitoring tumor marker  Goal is cure from breast cancer  Plan is monitoring  above abnormalities Condition and plan discussed in detail  Questions answered  Discussed the importance of self-breast examination, eating healthy foods, exercises as tolerated and health screening tests  See orders below  1  History of right breast cancer    - CBC and differential; Standing  - Comprehensive metabolic panel; Standing  - CBC and differential  - Comprehensive metabolic panel    2  Abnormal tumor markers    - Cancer antigen 27 29; Standing  - Cancer antigen 27 29    3  Lung nodules    - CT chest wo contrast; Future    4  Thyroid nodule    - US thyroid; Future    5  Vitamin D deficiency    - Vitamin D 25 hydroxy; Future    Patient voiced understanding and agreement in the discussion  Counseling / Coordination of Care :   35 min   Greater than 50% of total time was spent with the patient and / or family counseling and / or coordination of care

## 2018-05-22 NOTE — LETTER
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, stage I , 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  This was diagnosed in 2006  Patient had right mastectomy and left breast reduction surgery  She did not require adjuvant therapy  She has been running slightly higher than normal tumor marker CA 27 29 level and that has been  fluctuating  This time tumor marker is close to normal, 39, normal being up to 38  The highest was 61 in December 2014  Patient follows with her breast surgeon    History of stable thyroid nodules and they're being monitored by ultrasound  Also she sees her endocrinologist   She has small stable lung nodules being monitored by CT scan and there has not been any change since 2014  Largest nodule is 5 mm  She is a nonsmoker  History of vitamin-D deficiency and she has been taking vitamin-D and she wants level to be checked  Has some tiredness, minor arthralgia and some anxiety      Current Outpatient Prescriptions:     aspirin 81 MG tablet, Take 81 mg by mouth, Disp: , Rfl:     Calcium Ascorbate 500 MG TABS, Take 500 mg by mouth, Disp: , Rfl:     calcium carbonate 1250 MG capsule, Take by mouth, Disp: , Rfl:     Cholecalciferol 1000 units capsule, Take 1,000 Units by mouth, Disp: , Rfl:     Coenzyme Q10 10 MG capsule, Once daily, Disp: , Rfl:     Glucosamine 750 MG TABS, Take 300 mg by mouth, Disp: , Rfl:     Multiple Vitamins-Minerals (MULTI COMPLETE) CAPS, Take by mouth, Disp: , Rfl:     Omega-3 Fatty Acids ([employer] FISH OIL) 1000 MG CAPS, Take by mouth, Disp: , Rfl:     Probiotic Product (PROBIOTIC-10 PO), Take by mouth, Disp: , Rfl:     TURMERIC CURCUMIN PO, by Does not apply route, Disp: , Rfl:     Allergies   Allergen Reactions    Latex Other (See Comments)     rash    Other        ROS:  05/22/18 Reviewed 13 systems:  Presently no headaches, seizures, dizziness, diplopia, dysphagia, hoarseness, chest pain, palpitations, shortness of breath, cough, hemoptysis, abdominal pain, nausea, vomiting, change in bowel habits, melena, hematuria, fever, chills, bleeding, bone pains, skin rash, weight loss,   weakness, numbness,  claudication and gait problem  No frequent infections  Not unusually sensitive to heat or cold  No swelling of the ankles  No swollen glands  Patient is anxious  No GYN symptoms Other symptoms are in HPI        /78 (BP Location: Left arm, Cuff Size: Adult)   Pulse 83   Temp 98 8 °F (37 1 °C) (Tympanic)   Resp 16   Ht 5' 4" (1 626 m)   Wt 65 3 kg (144 lb)   SpO2 99%   BMI 24 72 kg/m²      Physical Exam:  Alert, oriented, not in distress, no icterus, no oral thrush, no palpable neck mass, clear lung fields, regular heart rate, abdomen  soft and non tender, no palpable abdominal mass, no ascites, no edema of ankles, no calf tenderness, no focal neurological deficit, no skin rash, no palpable lymphadenopathy, good arterial pulses, no clubbing  Patient is anxious  Performance status 1  No lymphedema    She goes to her breast surgeon for examination of breasts and imaging studies    IMAGING:  No orders to display       LABS:  Results for orders placed or performed in visit on 05/02/18   Comprehensive metabolic panel   Result Value Ref Range    SL AMB GLUCOSE 93 65 - 99 mg/dL    BUN 21 7 - 25 mg/dL    Creatinine, Serum 0 74 0 50 - 0 99 mg/dL    eGFR Non  American 87 > OR = 60 mL/min/1 73m2    SL AMB EGFR  101 > OR = 60 mL/min/1 73m2    SL AMB BUN/CREATININE RATIO NOT APPLICABLE 6 - 22 (calc)    SL AMB SODIUM 138 135 - 146 mmol/L    SL AMB POTASSIUM 4 3 3 5 - 5 3 mmol/L    SL AMB CHLORIDE 102 98 - 110 mmol/L    SL AMB CARBON DIOXIDE 26 20 - 31 mmol/L    SL AMB CALCIUM 9 3 8 6 - 10 4 mg/dL    SL AMB PROTEIN, TOTAL 6 6 6 1 - 8 1 g/dL    Serum Albumin 4 1 3 6 - 5 1 g/dL    SL AMB GLOBULIN 2 5 1 9 - 3 7 g/dL (calc)    SL AMB ALBUMIN/GLOBULIN RATIO 1 6 1 0 - 2 5 (calc)    SL AMB BILIRUBIN, TOTAL 0 4 0 2 - 1 2 mg/dL    SL AMB ALKALINE PHOSPHATASE 78 33 - 130 U/L    SL AMB AST 16 10 - 35 U/L    SL AMB ALT 15 6 - 29 U/L   CBC and differential   Result Value Ref Range    SL AMB LAB WHITE BLOOD CELL COUNT 5 7 3 8 - 10 8 Thousand/uL    SL AMB LAB RED BLOOD CELLS 4 60 3 80 - 5 10 Million/uL    Hemoglobin 13 0 11 7 - 15 5 g/dL    Hematocrit 40 0 35 0 - 45 0 %    MCV 87 0 80 0 - 100 0 fL    MCH 28 3 27 0 - 33 0 pg    MCHC 32 5 32 0 - 36 0 g/dL    RDW 12 5 11 0 - 15 0 %    Platelet Count 115 858 - 400 Thousand/uL    SL AMB MPV 10 1 7 5 - 12 5 fL    Neutrophils (Absolute) 3,477 1,500 - 7,800 cells/uL    Lymphocytes (Absolute) 1,402 850 - 3,900 cells/uL    Monocytes (Absolute) 564 200 - 950 cells/uL    Eosinophils (Absolute) 239 15 - 500 cells/uL    Basophils (Absolute) 17 0 - 200 cells/uL    Neutrophils 61 %    Lymphocytes 24 6 %    Monocytes 9 9 %    Eosinophils 4 2 %    Basophils 0 3 %   Cancer antigen 27 29   Result Value Ref Range    CA27 29 (CHIRON) 39 (H) <38 U/mL     Labs, Imaging, & Other studies:   All pertinent labs and imaging studies were personally reviewed    Lab Results   Component Value Date     11/15/2017    K 4 2 11/15/2017     11/15/2017    CO2 33 (H) 11/15/2017    BUN 21 05/02/2018    CREATININE 0 74 05/02/2018    CALCIUM 9 3 05/02/2018    AST 16 11/15/2017    ALT 16 11/15/2017    ALKPHOS 92 11/15/2017    PROT 6 7 11/15/2017    BILITOT 0 5 11/15/2017     Lab Results   Component Value Date    WBC 6 6 11/15/2017    HGB 13 0 05/02/2018    HCT 40 0 05/02/2018    MCV 87 0 05/02/2018     05/02/2018   No results found for: [facility] [facility] and discussed with patient  Assessment and plan:  Clover Martin is a 59 y o  female with  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, stage I , 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  This was diagnosed in 2006  Patient had right mastectomy and left breast reduction surgery  She did not require adjuvant therapy  She has been running slightly higher than normal tumor marker CA 27 29 level and that has been  fluctuating  This time tumor marker is close to normal, 39, normal being up to 38  The highest was 61 in December 2014  Patient follows with her breast surgeon    History of stable thyroid nodules and they're being monitored by ultrasound  Also she sees her endocrinologist   She has small stable lung nodules being monitored by CT scan and there has not been any change since 2014  Largest nodule is 5 mm  She is a nonsmoker  History of vitamin-D deficiency and she has been taking vitamin-D and she wants level to be checked  Has some tiredness, minor arthralgia and some anxiety     Physical examination and test results are as recorded and discussed  Breast cancer is in remission  Stable lung nodules and thyroid nodule  Monitoring tumor marker  Goal is cure from breast cancer  Plan is monitoring  above abnormalities Condition and plan discussed in detail  Questions answered  Discussed the importance of self-breast examination, eating healthy foods, exercises as tolerated and health screening tests  See orders below  1  History of right breast cancer    - CBC and differential; Standing  - Comprehensive metabolic panel; Standing  - CBC and differential  - Comprehensive metabolic panel    2  Abnormal tumor markers    - Cancer antigen 27 29; Standing  - Cancer antigen 27 29    3  Lung nodules    - CT chest wo contrast; Future    4  Thyroid nodule    - US thyroid; Future    5  Vitamin D deficiency    - Vitamin D 25 hydroxy; Future    Patient voiced understanding and agreement in the discussion  Counseling / Coordination of Care :   35 min   Greater than 50% of total time was spent with the patient and / or family counseling and / or coordination of care

## 2018-07-10 ENCOUNTER — HOSPITAL ENCOUNTER (OUTPATIENT)
Dept: ULTRASOUND IMAGING | Facility: HOSPITAL | Age: 63
Discharge: HOME | End: 2018-07-10
Payer: COMMERCIAL

## 2018-07-10 ENCOUNTER — TRANSCRIBE ORDERS (OUTPATIENT)
Dept: ADMINISTRATIVE | Facility: HOSPITAL | Age: 63
End: 2018-07-10

## 2018-07-10 DIAGNOSIS — K20.0 EOSINOPHILIC ESOPHAGITIS: Primary | ICD-10-CM

## 2018-07-10 DIAGNOSIS — N20.0 CALCULUS OF KIDNEY: ICD-10-CM

## 2018-07-10 DIAGNOSIS — K20.0 EOSINOPHILIC ESOPHAGITIS: ICD-10-CM

## 2018-07-10 PROCEDURE — 76770 US EXAM ABDO BACK WALL COMP: CPT

## 2018-10-24 NOTE — PRE-PROCEDURE INSTRUCTIONS
Pre-Surgery Instructions:   Medication Instructions    aspirin 81 MG tablet Instructed patient per Anesthesia Guidelines   Cholecalciferol 1000 units capsule Instructed patient per Anesthesia Guidelines   Coenzyme Q10 10 MG capsule Instructed patient per Anesthesia Guidelines   Glucosamine 750 MG TABS Instructed patient per Anesthesia Guidelines   Multiple Vitamins-Minerals (MULTI COMPLETE) CAPS Instructed patient per Anesthesia Guidelines   Omega-3 Fatty Acids ([employer]S FISH OIL) 1000 MG CAPS Instructed patient per Anesthesia Guidelines   Probiotic Product (PROBIOTIC-10 PO) Instructed patient per Anesthesia Guidelines   TURMERIC CURCUMIN PO Instructed patient per Anesthesia Guidelines   UNKNOWN TO PATIENT Instructed patient per Anesthesia Guidelines    Patient given/ instructed on use of chlorhexidine soap per hospital protocol    Patient instructed to stop all  NSAIDS, vitamins and herbal supplements one week prior to surgery or per Dr Blanquita Guzman   Pt reports that Dr Blanquita Guzman told her she did not have to stop baby aspirin

## 2018-11-01 ENCOUNTER — ANESTHESIA EVENT (OUTPATIENT)
Dept: PERIOP | Facility: HOSPITAL | Age: 63
End: 2018-11-01
Payer: COMMERCIAL

## 2018-11-02 ENCOUNTER — APPOINTMENT (OUTPATIENT)
Dept: RADIOLOGY | Facility: HOSPITAL | Age: 63
End: 2018-11-02
Payer: COMMERCIAL

## 2018-11-02 ENCOUNTER — ANESTHESIA (OUTPATIENT)
Dept: PERIOP | Facility: HOSPITAL | Age: 63
End: 2018-11-02
Payer: COMMERCIAL

## 2018-11-02 ENCOUNTER — HOSPITAL ENCOUNTER (OUTPATIENT)
Facility: HOSPITAL | Age: 63
Setting detail: OUTPATIENT SURGERY
Discharge: HOME | End: 2018-11-02
Attending: PODIATRIST | Admitting: PODIATRIST
Payer: COMMERCIAL

## 2018-11-02 VITALS
OXYGEN SATURATION: 98 % | HEART RATE: 64 BPM | BODY MASS INDEX: 22.99 KG/M2 | DIASTOLIC BLOOD PRESSURE: 65 MMHG | HEIGHT: 65 IN | WEIGHT: 138 LBS | RESPIRATION RATE: 20 BRPM | TEMPERATURE: 97.4 F | SYSTOLIC BLOOD PRESSURE: 151 MMHG

## 2018-11-02 PROCEDURE — L8642 HALLUX IMPLANT: HCPCS | Performed by: PODIATRIST

## 2018-11-02 PROCEDURE — 73630 X-RAY EXAM OF FOOT: CPT

## 2018-11-02 DEVICE — 8 MM SYNTHETIC CARTILAGE IMPLANT
Type: IMPLANTABLE DEVICE | Site: TOE | Status: FUNCTIONAL
Brand: CARTIVA SYNTHETIC CARTILAGE IMPLANT

## 2018-11-02 RX ORDER — BUPIVACAINE HYDROCHLORIDE 5 MG/ML
INJECTION, SOLUTION PERINEURAL AS NEEDED
Status: DISCONTINUED | OUTPATIENT
Start: 2018-11-02 | End: 2018-11-02 | Stop reason: HOSPADM

## 2018-11-02 RX ORDER — OXYCODONE HYDROCHLORIDE AND ACETAMINOPHEN 5; 325 MG/1; MG/1
1 TABLET ORAL EVERY 4 HOURS PRN
Status: DISCONTINUED | OUTPATIENT
Start: 2018-11-02 | End: 2018-11-02 | Stop reason: HOSPADM

## 2018-11-02 RX ORDER — FENTANYL CITRATE/PF 50 MCG/ML
25 SYRINGE (ML) INJECTION
Status: DISCONTINUED | OUTPATIENT
Start: 2018-11-02 | End: 2018-11-02 | Stop reason: HOSPADM

## 2018-11-02 RX ORDER — PROPOFOL 10 MG/ML
INJECTION, EMULSION INTRAVENOUS CONTINUOUS PRN
Status: DISCONTINUED | OUTPATIENT
Start: 2018-11-02 | End: 2018-11-02 | Stop reason: SURG

## 2018-11-02 RX ORDER — ONDANSETRON 2 MG/ML
4 INJECTION INTRAMUSCULAR; INTRAVENOUS ONCE AS NEEDED
Status: DISCONTINUED | OUTPATIENT
Start: 2018-11-02 | End: 2018-11-02 | Stop reason: HOSPADM

## 2018-11-02 RX ORDER — ONDANSETRON 2 MG/ML
INJECTION INTRAMUSCULAR; INTRAVENOUS AS NEEDED
Status: DISCONTINUED | OUTPATIENT
Start: 2018-11-02 | End: 2018-11-02 | Stop reason: SURG

## 2018-11-02 RX ORDER — MIDAZOLAM HYDROCHLORIDE 1 MG/ML
INJECTION INTRAMUSCULAR; INTRAVENOUS AS NEEDED
Status: DISCONTINUED | OUTPATIENT
Start: 2018-11-02 | End: 2018-11-02 | Stop reason: SURG

## 2018-11-02 RX ORDER — PROPOFOL 10 MG/ML
INJECTION, EMULSION INTRAVENOUS AS NEEDED
Status: DISCONTINUED | OUTPATIENT
Start: 2018-11-02 | End: 2018-11-02 | Stop reason: SURG

## 2018-11-02 RX ORDER — SODIUM CHLORIDE 9 MG/ML
125 INJECTION, SOLUTION INTRAVENOUS CONTINUOUS
Status: DISCONTINUED | OUTPATIENT
Start: 2018-11-02 | End: 2018-11-02 | Stop reason: HOSPADM

## 2018-11-02 RX ORDER — FENTANYL CITRATE 50 UG/ML
INJECTION, SOLUTION INTRAMUSCULAR; INTRAVENOUS AS NEEDED
Status: DISCONTINUED | OUTPATIENT
Start: 2018-11-02 | End: 2018-11-02 | Stop reason: SURG

## 2018-11-02 RX ORDER — MAGNESIUM HYDROXIDE 1200 MG/15ML
LIQUID ORAL AS NEEDED
Status: DISCONTINUED | OUTPATIENT
Start: 2018-11-02 | End: 2018-11-02 | Stop reason: HOSPADM

## 2018-11-02 RX ADMIN — ONDANSETRON HYDROCHLORIDE 4 MG: 2 INJECTION, SOLUTION INTRAVENOUS at 09:54

## 2018-11-02 RX ADMIN — FENTANYL CITRATE 50 MCG: 50 INJECTION, SOLUTION INTRAMUSCULAR; INTRAVENOUS at 08:59

## 2018-11-02 RX ADMIN — SODIUM CHLORIDE: 0.9 INJECTION, SOLUTION INTRAVENOUS at 09:59

## 2018-11-02 RX ADMIN — SODIUM CHLORIDE 125 ML/HR: 0.9 INJECTION, SOLUTION INTRAVENOUS at 07:30

## 2018-11-02 RX ADMIN — FENTANYL CITRATE 50 MCG: 50 INJECTION, SOLUTION INTRAMUSCULAR; INTRAVENOUS at 09:59

## 2018-11-02 RX ADMIN — PROPOFOL 100 MG: 10 INJECTION, EMULSION INTRAVENOUS at 08:49

## 2018-11-02 RX ADMIN — CEFAZOLIN SODIUM 1000 MG: 1 SOLUTION INTRAVENOUS at 08:45

## 2018-11-02 RX ADMIN — OXYCODONE HYDROCHLORIDE AND ACETAMINOPHEN 1 TABLET: 5; 325 TABLET ORAL at 11:17

## 2018-11-02 RX ADMIN — PROPOFOL 100 MCG/KG/MIN: 10 INJECTION, EMULSION INTRAVENOUS at 08:55

## 2018-11-02 RX ADMIN — MIDAZOLAM 2 MG: 1 INJECTION INTRAMUSCULAR; INTRAVENOUS at 08:45

## 2018-11-02 NOTE — ANESTHESIA PREPROCEDURE EVALUATION
Review of Systems/Medical History  Patient summary reviewed  Chart reviewed      Cardiovascular  Negative cardio ROS    Pulmonary  Negative pulmonary ROS        GI/Hepatic    GERD well controlled,        Kidney stones,        Endo/Other  History of thyroid disease ,   Comment: Thyroid nodule    GYN    Hysterectomy,        Hematology  Negative hematology ROS      Musculoskeletal    Arthritis     Neurology  Negative neurology ROS      Psychology   Negative psychology ROS              Physical Exam    Airway    Mallampati score: III  TM Distance: >3 FB  Neck ROM: limited     Dental   No notable dental hx     Cardiovascular  Comment: Negative ROS, Rhythm: regular, Rate: normal, Cardiovascular exam normal    Pulmonary  Pulmonary exam normal Breath sounds clear to auscultation,     Other Findings        Anesthesia Plan  ASA Score- 2     Anesthesia Type- IV sedation with anesthesia and general with ASA Monitors  Additional Monitors:   Airway Plan:         Plan Factors- Patient instructed to abstain from smoking on day of procedure  Patient did not smoke on day of surgery  Induction- intravenous  Postoperative Plan-     Informed Consent- Anesthetic plan and risks discussed with patient

## 2018-11-02 NOTE — OP NOTE
OPERATIVE REPORT  PATIENT NAME: Clover Martin    :  1955  MRN: [id number]  Pt Location: [facility] OR ROOM 03    SURGERY DATE: 2018    Surgeon(s) and Role:     * Marium Elder DPM - Primary     * Monty Cárdenas DPM - Assisting    Preop Diagnosis:  Hallux rigidus of right foot [M20 21]    Post-Op Diagnosis Codes:     * Hallux rigidus of right foot [M20 21]    Procedure(s) (LRB):  CHEILECTOMY HALLUX RIGIDUS; GREAT TOE CARTIVA IMPLANT (Right)    Specimen(s):  * No specimens in log *    Hemostasis:  Pneumatic ankle tourniquet 250 mm of mercury for 47 min    Estimated Blood Loss:   Minimal    Materials:  3 0 Vicryl  4 0 Vicryl  4 0 nylon  8 mm cartiva implant    Drains:  None     Anesthesia Type:   IV Sedation with 10 cc of one-to-one mix of 1% lidocaine plain and 0 5% Marcaine plain    Operative Indications:  Hallux rigidus of right foot [M20 21]      Operative Findings:  C/w diagnosis  Injectables:  7 cc of 0 5% Marcaine plain    Complications:   None    Procedure and Technique:  Under mild sedation, the patient was brought into the operating room and placed on the operating room table in the supine position  A pneumatic ankle tourniquet was then placed around the patient's right ankle with ample webril padding  A time out was performed to confirm the correct patient, procedure and site with all parties in agreement  Following IV sedation, local anesthetic was obtained about the patient's foot was performed consisting of 10 ml of 1% Lidocaine and 0 5% Bupivacaine in a 1:1 mixture  The foot was then scrubbed, prepped and draped in the usual aseptic manner  An esmarch bandage was utilized to exsangunate the patients foot and the pneumatic ankle tourniquet was then inflated  The esmarch bandage was removed and the foot was placed on the operating room table  Attention was then directed to the dorsal aspect of the first metatarsal where an approximately 5 cm linear incision was made   The incision was deepened through the subcutaneous tissues using sharp and blunt dissection  Care was taken to identify and retract all vital neural and vascular structures  All bleeders were cauterized and ligated as necessary  A capsuloptomy was performed over the dorsal aspect of the MPJ  The periosteal and capsular structures were then carefully dissected free of their osseous attachments and reflected medially and laterally, thus exposing the head of the first metatarsal and base of proximal phalanx at the operative site       Attention was then directed to the first met head where the dorsal, medial and lateral prominences were resected by the sagittal bone saw  Osteophytes from base of proximal phalanx were removed with bone rongeur  At this time, a guide wire with the cartiva set was placed within the metatarsal head in order as a guide for the reamer  An 8 mm size implant was deemed necessary so at this time, an 8 mm reamer was used to prep the metatarsal head  The incision was irrigated with normal sterile saline, then the appropriate size cartiva implant was placed according to standard manufacturing protocol  The range of motion was increased to the 1st MPJ  No clicking or joint crepitus was noted  The wound was irrigated with normal sterile saline  Capsular structures were reapproximated 3 0 Vicryl  Subcutaneous closure was obtained with 4 0 Vicryl  Skin closure was obtained with 40 nylon in horizontal mattress fashion  A postoperative injection consisting of 7 ml of 0 5% Bupivacaine was performed  The incision site was then dressed with Betadine soaked adaptic, Dry sterile dressing  The pneumatic ankle tourniquet was deflated and normal hyperemic flush was noted to all digits  The patient tolerated the procedure and anesthesia well and was transported to the PACU with vital signs stable           Patient Disposition:  PACU     SIGNATURE: Joseph Bentley DPM  DATE: November 2, 2018  TIME: 9:53 AM

## 2018-11-02 NOTE — ANESTHESIA POSTPROCEDURE EVALUATION
Post-Op Assessment Note      CV Status:  Stable    Mental Status:  Alert and awake    Hydration Status:  Euvolemic    PONV Controlled:  Controlled    Airway Patency:  Patent    Post Op Vitals Reviewed: Yes          Staff: Anesthesiologist           /70 (11/02/18 1034)    Temp (!) 97 4 °F (36 3 °C) (11/02/18 1034)    Pulse 60 (11/02/18 1034)   Resp 14 (11/02/18 1034)    SpO2 96 % (11/02/18 1034)

## 2018-11-02 NOTE — DISCHARGE INSTRUCTIONS
[facility]  Dr Magali Hicks  Post-Operative Instructions    1  Take your prescribed medication as directed  2  Upon arrival at home, lie down and elevate your surgical foot on 2 pillows  3  Remain quiet, off your feet as much as possible, for the first 24-48 hours  This is when your feet first swell and may become painful  After 48 hours you may begin limited walking following these restrictions:   Weightbear as tolerated to surgical foot in surgical shoe  4  Drink large quantities of water and citrus fruit juice  Consume no alcohol  Continue a well-balanced diet  5  Report any unusual discomfort or fever to this office  6  A limited amount of discomfort and swelling is to be expected  In some cases the skin may take on a bruised appearance  The surgical solution that was applied to your foot prior to the operation is dark in color and the operation site may appear to be oozing when it actually is not  7  A slight amount of blood is to be expected, and is no cause for alarm  Do not remove the dressings  If there is active bleeding and if the bleeding persists, add additional gauze to the bandage, apply direct pressure, elevate your feet and call this office  8  Do not get the dressings wet  As regular bathing may be inconvenient, sponge baths are recommended  9  When anesthesia wears off and if any discomfort should be present, apply an ice pack directly over the operated area for 15 minute intervals for several hours or until the pain leaves  (USE IN EXCESS OF 15 MINUTES COULD CAUSE FROSTBITE)  Do not use hot water bags or electric pads  A convenient icepack can be made by placing ice cubes in a plastic bag and covering this with a towel  10  If necessary, take a mild laxative before retiring  11  Wear your special open shoes anytime you put weight on your foot, even if it is just to walk to the bathroom and back   It will probably be 2 or 3 weeks before you will be permitted to try regular shoes  12  Having performed the operation, we are interested in a prompt recovery  Please cooperate by following the above instructions  13  Please call to confirm your post-op appointment or call with any other questions

## 2018-11-02 NOTE — DISCHARGE SUMMARY
Discharge Summary Outpatient Procedure Podiatry -   Clover Martin 59 y o  female MRN: [id number]  Unit/Bed#: OR TUTU Encounter: [id number]    Admission Date: 11/2/2018     Admitting Diagnosis: Hallux rigidus of right foot [M20 21]    Discharge Diagnosis: same    Procedures Performed: CHEILECTOMY HALLUX RIGIDUS; GREAT TOE CARTIVA IMPLANT: 22815 (CPT®)    Complications: none    Condition at Discharge: stable    Discharge instructions/Information to patient and family:   See after visit summary for information provided to patient and family  Provisions for Follow-Up Care/Important appointments:  See after visit summary for information related to follow-up care and any pertinent home health orders  Discharge Medications:  See after visit summary for reconciled discharge medications provided to patient and family

## 2018-11-13 ENCOUNTER — HOSPITAL ENCOUNTER (OUTPATIENT)
Dept: CT IMAGING | Facility: HOSPITAL | Age: 63
Discharge: HOME | End: 2018-11-13
Attending: INTERNAL MEDICINE
Payer: COMMERCIAL

## 2018-11-13 ENCOUNTER — HOSPITAL ENCOUNTER (OUTPATIENT)
Dept: ULTRASOUND IMAGING | Facility: HOSPITAL | Age: 63
Discharge: HOME | End: 2018-11-13
Attending: INTERNAL MEDICINE
Payer: COMMERCIAL

## 2018-11-13 DIAGNOSIS — E04.1 THYROID NODULE: ICD-10-CM

## 2018-11-13 DIAGNOSIS — R91.8 LUNG NODULES: ICD-10-CM

## 2018-11-13 PROCEDURE — 71250 CT THORAX DX C-: CPT

## 2018-11-13 PROCEDURE — 76536 US EXAM OF HEAD AND NECK: CPT

## 2018-11-28 ENCOUNTER — TELEPHONE (OUTPATIENT)
Dept: HEMATOLOGY ONCOLOGY | Facility: CLINIC | Age: 63
End: 2018-11-28

## 2018-11-28 ENCOUNTER — TRANSCRIBE ORDERS (OUTPATIENT)
Dept: ADMINISTRATIVE | Facility: HOSPITAL | Age: 63
End: 2018-11-28

## 2018-11-28 DIAGNOSIS — N28.1 ACQUIRED CYST OF KIDNEY: Primary | ICD-10-CM

## 2018-11-28 NOTE — TELEPHONE ENCOUNTER
Patient is calling wanting a copy of the report of the CT scan done 11/13  She does not have MyChart  Please call back and advise  Thank you

## 2018-11-30 NOTE — TELEPHONE ENCOUNTER
Called and LVM informing patient that she can go to any of our [facility] offices and have them print the results for her  I informed her if she wants them mailed she can call our office and let us know that as well  Please inform patient of this information if she returns the call

## 2018-12-01 LAB
25(OH)D3 SERPL-MCNC: 39 NG/ML (ref 30–100)
ALBUMIN SERPL-MCNC: 4.2 G/DL (ref 3.6–5.1)
ALBUMIN/GLOB SERPL: 1.4 (CALC) (ref 1–2.5)
ALP SERPL-CCNC: 92 U/L (ref 33–130)
ALT SERPL-CCNC: 13 U/L (ref 6–29)
AST SERPL-CCNC: 14 U/L (ref 10–35)
BASOPHILS # BLD AUTO: 29 CELLS/UL (ref 0–200)
BASOPHILS NFR BLD AUTO: 0.3 %
BILIRUB SERPL-MCNC: 0.5 MG/DL (ref 0.2–1.2)
BUN SERPL-MCNC: 23 MG/DL (ref 7–25)
BUN/CREAT SERPL: NORMAL (CALC) (ref 6–22)
CALCIUM SERPL-MCNC: 9.4 MG/DL (ref 8.6–10.4)
CANCER AG27-29 SERPL-ACNC: 50 U/ML
CHLORIDE SERPL-SCNC: 102 MMOL/L (ref 98–110)
CO2 SERPL-SCNC: 29 MMOL/L (ref 20–32)
CREAT SERPL-MCNC: 0.78 MG/DL (ref 0.5–0.99)
EOSINOPHIL # BLD AUTO: 284 CELLS/UL (ref 15–500)
EOSINOPHIL NFR BLD AUTO: 2.9 %
ERYTHROCYTE [DISTWIDTH] IN BLOOD BY AUTOMATED COUNT: 12.7 % (ref 11–15)
GLOBULIN SER CALC-MCNC: 2.9 G/DL (CALC) (ref 1.9–3.7)
GLUCOSE SERPL-MCNC: 92 MG/DL (ref 65–99)
HCT VFR BLD AUTO: 39.1 % (ref 35–45)
HGB BLD-MCNC: 13 G/DL (ref 11.7–15.5)
LYMPHOCYTES # BLD AUTO: 1950 CELLS/UL (ref 850–3900)
LYMPHOCYTES NFR BLD AUTO: 19.9 %
MCH RBC QN AUTO: 28.3 PG (ref 27–33)
MCHC RBC AUTO-ENTMCNC: 33.2 G/DL (ref 32–36)
MCV RBC AUTO: 85 FL (ref 80–100)
MONOCYTES # BLD AUTO: 1107 CELLS/UL (ref 200–950)
MONOCYTES NFR BLD AUTO: 11.3 %
NEUTROPHILS # BLD AUTO: 6429 CELLS/UL (ref 1500–7800)
NEUTROPHILS NFR BLD AUTO: 65.6 %
PLATELET # BLD AUTO: 310 THOUSAND/UL (ref 140–400)
PMV BLD REES-ECKER: 10.9 FL (ref 7.5–12.5)
POTASSIUM SERPL-SCNC: 3.9 MMOL/L (ref 3.5–5.3)
PROT SERPL-MCNC: 7.1 G/DL (ref 6.1–8.1)
RBC # BLD AUTO: 4.6 MILLION/UL (ref 3.8–5.1)
SL AMB EGFR AFRICAN AMERICAN: 94 ML/MIN/1.73M2
SL AMB EGFR NON AFRICAN AMERICAN: 81 ML/MIN/1.73M2
SODIUM SERPL-SCNC: 141 MMOL/L (ref 135–146)
WBC # BLD AUTO: 9.8 THOUSAND/UL (ref 3.8–10.8)

## 2018-12-04 ENCOUNTER — HOSPITAL ENCOUNTER (OUTPATIENT)
Dept: ULTRASOUND IMAGING | Facility: HOSPITAL | Age: 63
Discharge: HOME | End: 2018-12-04
Payer: COMMERCIAL

## 2018-12-04 DIAGNOSIS — N28.1 ACQUIRED CYST OF KIDNEY: ICD-10-CM

## 2018-12-04 PROCEDURE — 76770 US EXAM ABDO BACK WALL COMP: CPT

## 2018-12-08 ENCOUNTER — TELEPHONE (OUTPATIENT)
Dept: HEMATOLOGY ONCOLOGY | Facility: CLINIC | Age: 63
End: 2018-12-08

## 2018-12-10 ENCOUNTER — TELEPHONE (OUTPATIENT)
Dept: HEMATOLOGY ONCOLOGY | Facility: CLINIC | Age: 63
End: 2018-12-10

## 2018-12-10 DIAGNOSIS — Q61.02 MULTIPLE RENAL CYSTS: ICD-10-CM

## 2018-12-10 DIAGNOSIS — R97.8 ABNORMAL TUMOR MARKERS: ICD-10-CM

## 2018-12-10 DIAGNOSIS — Z85.3 HISTORY OF BREAST CANCER: Primary | ICD-10-CM

## 2018-12-10 DIAGNOSIS — F41.9 ANXIETY: Primary | ICD-10-CM

## 2018-12-10 NOTE — TELEPHONE ENCOUNTER
Pt's PCP suggested that we order MRI with contrast   MRI order states w wo contrast- she wants to know if that is ok  Also her MRI was scheduled for Sat Dec 15th at 10am at [facility]  Pt is aware   She would like to know if we could prescribe her something that will help keep her still while she is getting her MRI  She said last time she felt anxious  We can send it to the [facility] [address] [facility] on [address] in [facility]

## 2018-12-10 NOTE — TELEPHONE ENCOUNTER
Called and informed patient that the MRI w wo contrast would be fine because she will have both with and without contrast, per her PCP wanting to have with contrast  I informed her I would send over the information to Dr Josselyn Balderas and/or his nurse to review to see if we can send a script over to the pharmacy for her so that she can have the medication prior to her MRI  Pharmacy has been verified  Please review with Dr Josselyn Balderas and let me know what information I can provide to the patient

## 2018-12-10 NOTE — PROGRESS NOTES
I gave all the recent test results to the patient  She will have MRI scan of abdomen with special attention to kidneys for cysts in the right kidney  She will have tumor marker in 3 months and 6 months and other blood tests in 6 months

## 2018-12-11 RX ORDER — ALPRAZOLAM 0.25 MG/1
0.25 TABLET ORAL ONCE
Qty: 2 TABLET | Refills: 0 | Status: SHIPPED | OUTPATIENT
Start: 2018-12-11 | End: 2019-07-22

## 2018-12-12 ENCOUNTER — TELEPHONE (OUTPATIENT)
Dept: HEMATOLOGY ONCOLOGY | Facility: CLINIC | Age: 63
End: 2018-12-12

## 2018-12-12 NOTE — TELEPHONE ENCOUNTER
I spoke with patient earlier this week about high tumor marker and ultrasound results and suggested MRI scan of abdomen and my office was going to arrange that for her

## 2018-12-15 ENCOUNTER — HOSPITAL ENCOUNTER (OUTPATIENT)
Dept: MRI IMAGING | Facility: HOSPITAL | Age: 63
Discharge: HOME | End: 2018-12-15
Attending: INTERNAL MEDICINE
Payer: COMMERCIAL

## 2018-12-15 DIAGNOSIS — Z85.3 HISTORY OF BREAST CANCER: ICD-10-CM

## 2018-12-15 DIAGNOSIS — R97.8 ABNORMAL TUMOR MARKERS: ICD-10-CM

## 2018-12-15 DIAGNOSIS — Q61.02 MULTIPLE RENAL CYSTS: ICD-10-CM

## 2018-12-15 PROCEDURE — A9585 GADOBUTROL INJECTION: HCPCS | Performed by: INTERNAL MEDICINE

## 2018-12-15 PROCEDURE — 74183 MRI ABD W/O CNTR FLWD CNTR: CPT

## 2018-12-15 RX ADMIN — GADOBUTROL 6 ML: 604.72 INJECTION INTRAVENOUS at 10:35

## 2018-12-19 ENCOUNTER — TELEPHONE (OUTPATIENT)
Dept: HEMATOLOGY ONCOLOGY | Facility: CLINIC | Age: 63
End: 2018-12-19

## 2018-12-19 NOTE — TELEPHONE ENCOUNTER
CALL FROM [facility] - PT'S MRI OF ABDOMEN DONE ON 12/15/18 HAD SIGNIFICANT FINDINGS  PLEASE HAVE DR FERNANDES REVIEW      TXS

## 2019-01-02 ENCOUNTER — TELEPHONE (OUTPATIENT)
Dept: HEMATOLOGY ONCOLOGY | Facility: CLINIC | Age: 64
End: 2019-01-02

## 2019-01-02 NOTE — TELEPHONE ENCOUNTER
Spoke with pt and informed has 2 cysts in right kidney  Has multiple questions and will have Dr Curt Mcgrath call pt

## 2019-01-02 NOTE — TELEPHONE ENCOUNTER
Clover called can you go over mri results  Please call her on home number first then cell phone  She will be off today if no answer leave a message

## 2019-03-05 ENCOUNTER — TELEPHONE (OUTPATIENT)
Dept: HEMATOLOGY ONCOLOGY | Facility: CLINIC | Age: 64
End: 2019-03-05

## 2019-03-20 ENCOUNTER — TELEPHONE (OUTPATIENT)
Dept: HEMATOLOGY ONCOLOGY | Facility: CLINIC | Age: 64
End: 2019-03-20

## 2019-03-20 NOTE — TELEPHONE ENCOUNTER
Patient called on 3/19/19 stating she would like us to print out the results of her MRI she had on 12/15 along with the [address] [facility] [facility] from 12/4 and pick them up at the [place] location  These will be printed and put at the  for patient to

## 2019-03-24 LAB — CANCER AG27-29 SERPL-ACNC: 59 U/ML

## 2019-03-27 ENCOUNTER — TELEPHONE (OUTPATIENT)
Dept: HEMATOLOGY ONCOLOGY | Facility: CLINIC | Age: 64
End: 2019-03-27

## 2019-03-27 NOTE — TELEPHONE ENCOUNTER
I called patient's home number to discuss blood test results with and left a message for her on her answering machine to please call me back     I left my name and phone number  Patient's tumor marker CA 27-29 has gone up to 59  She has been running high tumor marker  I could order a PET-CT scan or CT scans depending upon approval by her insurance carrier

## 2019-03-29 ENCOUNTER — TELEPHONE (OUTPATIENT)
Dept: HEMATOLOGY ONCOLOGY | Facility: CLINIC | Age: 64
End: 2019-03-29

## 2019-03-29 DIAGNOSIS — Z85.3 HISTORY OF BREAST CANCER: ICD-10-CM

## 2019-03-29 DIAGNOSIS — R97.8 ABNORMAL TUMOR MARKERS: ICD-10-CM

## 2019-03-29 DIAGNOSIS — Q61.02 MULTIPLE RENAL CYSTS: Primary | ICD-10-CM

## 2019-03-29 NOTE — TELEPHONE ENCOUNTER
I gave results of rising CA 27 -29  Once she had even higher than 59  She would like that to be repeated in 2 months  She will have ultrasound of kidneys prior to next visit in June 2019

## 2019-03-29 NOTE — TELEPHONE ENCOUNTER
req that we call her back at her home # [phone]  From 10am to 5pm- we can reach her on her cell at [phone] or at work [phone]

## 2019-03-29 NOTE — TELEPHONE ENCOUNTER
Dr Andrew Dorsey ordered an U/s for this patient in early June  Req the pt call me to let me know where she wants to go and of any time preferences

## 2019-05-06 ENCOUNTER — APPOINTMENT (EMERGENCY)
Dept: MRI IMAGING | Facility: HOSPITAL | Age: 64
End: 2019-05-06
Payer: COMMERCIAL

## 2019-05-06 ENCOUNTER — HOSPITAL ENCOUNTER (EMERGENCY)
Facility: HOSPITAL | Age: 64
Discharge: HOME | End: 2019-05-06
Attending: EMERGENCY MEDICINE | Admitting: EMERGENCY MEDICINE
Payer: COMMERCIAL

## 2019-05-06 VITALS
BODY MASS INDEX: 22.67 KG/M2 | DIASTOLIC BLOOD PRESSURE: 88 MMHG | OXYGEN SATURATION: 97 % | HEART RATE: 76 BPM | RESPIRATION RATE: 18 BRPM | WEIGHT: 136.24 LBS | SYSTOLIC BLOOD PRESSURE: 179 MMHG | TEMPERATURE: 98.2 F

## 2019-05-06 DIAGNOSIS — R42 VERTIGO: ICD-10-CM

## 2019-05-06 DIAGNOSIS — R42 DIZZINESS: Primary | ICD-10-CM

## 2019-05-06 LAB
ALBUMIN SERPL BCP-MCNC: 3.8 G/DL (ref 3.5–5)
ALP SERPL-CCNC: 102 U/L (ref 46–116)
ALT SERPL W P-5'-P-CCNC: 36 U/L (ref 12–78)
ANION GAP SERPL CALCULATED.3IONS-SCNC: 9 MMOL/L (ref 4–13)
AST SERPL W P-5'-P-CCNC: 31 U/L (ref 5–45)
BASOPHILS # BLD AUTO: 0.04 THOUSANDS/ΜL (ref 0–0.1)
BASOPHILS NFR BLD AUTO: 1 % (ref 0–1)
BILIRUB SERPL-MCNC: 0.3 MG/DL (ref 0.2–1)
BUN SERPL-MCNC: 18 MG/DL (ref 5–25)
CALCIUM SERPL-MCNC: 9.5 MG/DL (ref 8.3–10.1)
CHLORIDE SERPL-SCNC: 103 MMOL/L (ref 100–108)
CO2 SERPL-SCNC: 28 MMOL/L (ref 21–32)
CREAT SERPL-MCNC: 0.59 MG/DL (ref 0.6–1.3)
EOSINOPHIL # BLD AUTO: 0.33 THOUSAND/ΜL (ref 0–0.61)
EOSINOPHIL NFR BLD AUTO: 4 % (ref 0–6)
ERYTHROCYTE [DISTWIDTH] IN BLOOD BY AUTOMATED COUNT: 13.2 % (ref 11.6–15.1)
GFR SERPL CREATININE-BSD FRML MDRD: 98 ML/MIN/1.73SQ M
GLUCOSE SERPL-MCNC: 103 MG/DL (ref 65–140)
HCT VFR BLD AUTO: 41.3 % (ref 34.8–46.1)
HGB BLD-MCNC: 13.5 G/DL (ref 11.5–15.4)
IMM GRANULOCYTES # BLD AUTO: 0.04 THOUSAND/UL (ref 0–0.2)
IMM GRANULOCYTES NFR BLD AUTO: 1 % (ref 0–2)
LYMPHOCYTES # BLD AUTO: 1.72 THOUSANDS/ΜL (ref 0.6–4.47)
LYMPHOCYTES NFR BLD AUTO: 21 % (ref 14–44)
MCH RBC QN AUTO: 27.6 PG (ref 26.8–34.3)
MCHC RBC AUTO-ENTMCNC: 32.7 G/DL (ref 31.4–37.4)
MCV RBC AUTO: 85 FL (ref 82–98)
MONOCYTES # BLD AUTO: 0.71 THOUSAND/ΜL (ref 0.17–1.22)
MONOCYTES NFR BLD AUTO: 9 % (ref 4–12)
NEUTROPHILS # BLD AUTO: 5.31 THOUSANDS/ΜL (ref 1.85–7.62)
NEUTS SEG NFR BLD AUTO: 64 % (ref 43–75)
NRBC BLD AUTO-RTO: 0 /100 WBCS
PLATELET # BLD AUTO: 276 THOUSANDS/UL (ref 149–390)
PMV BLD AUTO: 10.1 FL (ref 8.9–12.7)
POTASSIUM SERPL-SCNC: 4.4 MMOL/L (ref 3.5–5.3)
PROT SERPL-MCNC: 7.7 G/DL (ref 6.4–8.2)
RBC # BLD AUTO: 4.89 MILLION/UL (ref 3.81–5.12)
SODIUM SERPL-SCNC: 140 MMOL/L (ref 136–145)
WBC # BLD AUTO: 8.15 THOUSAND/UL (ref 4.31–10.16)

## 2019-05-06 PROCEDURE — 70549 MR ANGIOGRAPH NECK W/O&W/DYE: CPT

## 2019-05-06 PROCEDURE — 36415 COLL VENOUS BLD VENIPUNCTURE: CPT | Performed by: EMERGENCY MEDICINE

## 2019-05-06 PROCEDURE — 80053 COMPREHEN METABOLIC PANEL: CPT | Performed by: EMERGENCY MEDICINE

## 2019-05-06 PROCEDURE — 93005 ELECTROCARDIOGRAM TRACING: CPT

## 2019-05-06 PROCEDURE — 99284 EMERGENCY DEPT VISIT MOD MDM: CPT

## 2019-05-06 PROCEDURE — 99284 EMERGENCY DEPT VISIT MOD MDM: CPT | Performed by: EMERGENCY MEDICINE

## 2019-05-06 PROCEDURE — 96360 HYDRATION IV INFUSION INIT: CPT

## 2019-05-06 PROCEDURE — A9585 GADOBUTROL INJECTION: HCPCS | Performed by: EMERGENCY MEDICINE

## 2019-05-06 PROCEDURE — 85025 COMPLETE CBC W/AUTO DIFF WBC: CPT | Performed by: EMERGENCY MEDICINE

## 2019-05-06 PROCEDURE — 96361 HYDRATE IV INFUSION ADD-ON: CPT

## 2019-05-06 PROCEDURE — 70544 MR ANGIOGRAPHY HEAD W/O DYE: CPT

## 2019-05-06 RX ORDER — MECLIZINE HCL 12.5 MG/1
25 TABLET ORAL ONCE
Status: COMPLETED | OUTPATIENT
Start: 2019-05-06 | End: 2019-05-06

## 2019-05-06 RX ORDER — MECLIZINE HYDROCHLORIDE 25 MG/1
25 TABLET ORAL 3 TIMES DAILY PRN
Qty: 30 TABLET | Refills: 0 | Status: SHIPPED | OUTPATIENT
Start: 2019-05-06 | End: 2019-07-22

## 2019-05-06 RX ADMIN — MECLIZINE 25 MG: 12.5 TABLET ORAL at 15:56

## 2019-05-06 RX ADMIN — SODIUM CHLORIDE 1000 ML: 0.9 INJECTION, SOLUTION INTRAVENOUS at 15:59

## 2019-05-06 RX ADMIN — GADOBUTROL 6 ML: 604.72 INJECTION INTRAVENOUS at 17:44

## 2019-05-07 LAB
ATRIAL RATE: 83 BPM
P AXIS: 53 DEGREES
PR INTERVAL: 124 MS
QRS AXIS: 30 DEGREES
QRSD INTERVAL: 80 MS
QT INTERVAL: 364 MS
QTC INTERVAL: 427 MS
T WAVE AXIS: 67 DEGREES
VENTRICULAR RATE: 83 BPM

## 2019-05-07 PROCEDURE — 93010 ELECTROCARDIOGRAM REPORT: CPT | Performed by: INTERNAL MEDICINE

## 2019-06-03 ENCOUNTER — HOSPITAL ENCOUNTER (OUTPATIENT)
Dept: RADIOLOGY | Facility: HOSPITAL | Age: 64
Discharge: HOME | End: 2019-06-03
Payer: COMMERCIAL

## 2019-06-03 ENCOUNTER — TRANSCRIBE ORDERS (OUTPATIENT)
Dept: ADMINISTRATIVE | Facility: HOSPITAL | Age: 64
End: 2019-06-03

## 2019-06-03 ENCOUNTER — HOSPITAL ENCOUNTER (OUTPATIENT)
Dept: ULTRASOUND IMAGING | Facility: HOSPITAL | Age: 64
Discharge: HOME | End: 2019-06-03
Attending: INTERNAL MEDICINE
Payer: COMMERCIAL

## 2019-06-03 DIAGNOSIS — Q61.02 MULTIPLE RENAL CYSTS: ICD-10-CM

## 2019-06-03 DIAGNOSIS — M54.5 LOW BACK PAIN, UNSPECIFIED BACK PAIN LATERALITY, UNSPECIFIED CHRONICITY, WITH SCIATICA PRESENCE UNSPECIFIED: Primary | ICD-10-CM

## 2019-06-03 DIAGNOSIS — M54.5 LOW BACK PAIN, UNSPECIFIED BACK PAIN LATERALITY, UNSPECIFIED CHRONICITY, WITH SCIATICA PRESENCE UNSPECIFIED: ICD-10-CM

## 2019-06-03 PROCEDURE — 72110 X-RAY EXAM L-2 SPINE 4/>VWS: CPT

## 2019-06-03 PROCEDURE — 76770 US EXAM ABDO BACK WALL COMP: CPT

## 2019-06-05 ENCOUNTER — TELEPHONE (OUTPATIENT)
Dept: SURGICAL ONCOLOGY | Facility: CLINIC | Age: 64
End: 2019-06-05

## 2019-06-05 DIAGNOSIS — Z85.3 HISTORY OF BREAST CANCER: Primary | ICD-10-CM

## 2019-06-05 DIAGNOSIS — E55.9 VITAMIN D DEFICIENCY: ICD-10-CM

## 2019-06-12 LAB
BASOPHILS # BLD AUTO: 33 CELLS/UL (ref 0–200)
BASOPHILS NFR BLD AUTO: 0.5 %
CANCER AG27-29 SERPL-ACNC: 48 U/ML
EOSINOPHIL # BLD AUTO: 218 CELLS/UL (ref 15–500)
EOSINOPHIL NFR BLD AUTO: 3.3 %
ERYTHROCYTE [DISTWIDTH] IN BLOOD BY AUTOMATED COUNT: 13.1 % (ref 11–15)
HCT VFR BLD AUTO: 38 % (ref 35–45)
HGB BLD-MCNC: 12.6 G/DL (ref 11.7–15.5)
LYMPHOCYTES # BLD AUTO: 1333 CELLS/UL (ref 850–3900)
LYMPHOCYTES NFR BLD AUTO: 20.2 %
MCH RBC QN AUTO: 27.5 PG (ref 27–33)
MCHC RBC AUTO-ENTMCNC: 33.2 G/DL (ref 32–36)
MCV RBC AUTO: 83 FL (ref 80–100)
MONOCYTES # BLD AUTO: 620 CELLS/UL (ref 200–950)
MONOCYTES NFR BLD AUTO: 9.4 %
NEUTROPHILS # BLD AUTO: 4396 CELLS/UL (ref 1500–7800)
NEUTROPHILS NFR BLD AUTO: 66.6 %
PLATELET # BLD AUTO: 238 THOUSAND/UL (ref 140–400)
PMV BLD REES-ECKER: 10.8 FL (ref 7.5–12.5)
RBC # BLD AUTO: 4.58 MILLION/UL (ref 3.8–5.1)
WBC # BLD AUTO: 6.6 THOUSAND/UL (ref 3.8–10.8)

## 2019-06-18 ENCOUNTER — OFFICE VISIT (OUTPATIENT)
Dept: HEMATOLOGY ONCOLOGY | Facility: CLINIC | Age: 64
End: 2019-06-18
Payer: COMMERCIAL

## 2019-06-18 VITALS
HEIGHT: 65 IN | BODY MASS INDEX: 22.49 KG/M2 | WEIGHT: 135 LBS | SYSTOLIC BLOOD PRESSURE: 130 MMHG | TEMPERATURE: 98.2 F | HEART RATE: 76 BPM | OXYGEN SATURATION: 98 % | RESPIRATION RATE: 16 BRPM | DIASTOLIC BLOOD PRESSURE: 70 MMHG

## 2019-06-18 DIAGNOSIS — Z85.3 HISTORY OF BREAST CANCER: Primary | ICD-10-CM

## 2019-06-18 DIAGNOSIS — R91.8 LUNG NODULES: ICD-10-CM

## 2019-06-18 DIAGNOSIS — E04.1 THYROID NODULE: ICD-10-CM

## 2019-06-18 DIAGNOSIS — Q61.02 MULTIPLE RENAL CYSTS: ICD-10-CM

## 2019-06-18 DIAGNOSIS — R97.8 ABNORMAL TUMOR MARKERS: ICD-10-CM

## 2019-06-18 PROCEDURE — 99214 OFFICE O/P EST MOD 30 MIN: CPT | Performed by: INTERNAL MEDICINE

## 2019-06-21 ENCOUNTER — TRANSCRIBE ORDERS (OUTPATIENT)
Dept: ADMINISTRATIVE | Facility: HOSPITAL | Age: 64
End: 2019-06-21

## 2019-06-21 DIAGNOSIS — D49.6 BRAIN NEOPLASM (HCC): Primary | ICD-10-CM

## 2019-07-22 ENCOUNTER — OFFICE VISIT (OUTPATIENT)
Dept: OBGYN CLINIC | Facility: CLINIC | Age: 64
End: 2019-07-22
Payer: COMMERCIAL

## 2019-07-22 VITALS
BODY MASS INDEX: 23.99 KG/M2 | WEIGHT: 144 LBS | SYSTOLIC BLOOD PRESSURE: 104 MMHG | DIASTOLIC BLOOD PRESSURE: 60 MMHG | HEIGHT: 65 IN

## 2019-07-22 DIAGNOSIS — R10.2 PELVIC PAIN: ICD-10-CM

## 2019-07-22 DIAGNOSIS — Z12.4 ENCOUNTER FOR SCREENING FOR MALIGNANT NEOPLASM OF CERVIX: ICD-10-CM

## 2019-07-22 DIAGNOSIS — Z12.31 ENCOUNTER FOR SCREENING MAMMOGRAM FOR MALIGNANT NEOPLASM OF BREAST: ICD-10-CM

## 2019-07-22 DIAGNOSIS — Z01.419 WOMEN'S ANNUAL ROUTINE GYNECOLOGICAL EXAMINATION: Primary | ICD-10-CM

## 2019-07-22 PROCEDURE — 87624 HPV HI-RISK TYP POOLED RSLT: CPT | Performed by: OBSTETRICS & GYNECOLOGY

## 2019-07-22 PROCEDURE — G0145 SCR C/V CYTO,THINLAYER,RESCR: HCPCS | Performed by: OBSTETRICS & GYNECOLOGY

## 2019-07-22 PROCEDURE — 99386 PREV VISIT NEW AGE 40-64: CPT | Performed by: OBSTETRICS & GYNECOLOGY

## 2019-07-22 RX ORDER — LOSARTAN POTASSIUM 50 MG/1
100 TABLET ORAL DAILY
COMMUNITY
End: 2021-07-02 | Stop reason: DRUGHIGH

## 2019-07-22 NOTE — PROGRESS NOTES
ASSESSMENT & PLAN: Josette Villalpando is a 62 y o  [id number] with normal gynecologic exam     1   Routine well woman exam done today  2   Pap and HPV:Pap with HPV was done today  Current ASCCP Guidelines reviewed  3   Mammogram screening per breast surgeon  4   Colonoscopy recommended per guidelines  5  The patient is sexually active  6  The following were reviewed in today's visit: breast self exam, menopause, exercise and healthy diet  7  Patient to return to office in 12 months for annual exam    8  Atrophic vaginitis - discussed coconut oil daily  All questions have been answered to her satisfaction  CC:  Annual Gynecologic Examination    HPI: Josette Villalpando is a 62 y o  [id number] who presents for annual gynecologic examination  She has the following concerns: none    S/p hysterectomy >10 years ago, she has one ovary and her cervix  Uterus removed due to heavy bleeding and fibroids  Health Maintenance:    She exercises 4 days per week  She wears her seatbelt routinely  She does perform occasional monthly self breast exams  She feels safe at home  Patient does follow a balanced diet      Last mammogram/MRI: 2018  Last colonoscopy: 2017 (return in 5-7 years)       Past Medical History:   Diagnosis Date    Arthritis     Bunion of great toe of right foot     Dental crown present     Environmental allergies     ragweed    Exercises 5 to 6 times per week     treadmill    GERD (gastroesophageal reflux disease)     History of kidney stones     History of Lyme disease     History of palpitations     Kidney cysts     "bilat to be followed in 6 months with CT Scan"    Kidney stone     Left arm pain     "occas related to lifting weights recently"    Limb alert care status     RIGHT ;no BP/IV's    Lung nodule     Malignant neoplasm of upper-outer quadrant of right female breast ([address] )     right    Motion sickness     Thyroid disorder     nodules/enlarged    Tooth missing     Wears glasses        Past Surgical History:   Procedure Laterality Date    ABDOMINOPLASTY       SECTION      COLONOSCOPY      ESOPHAGOGASTRODUODENOSCOPY      HYSTERECTOMY      KNEE ARTHROSCOPY Left     MASTECTOMY Right     [place] HALLUX RIGIDUS W/CHEILECTOMY 1ST MP JT W/IMPLT Right 2018    Procedure: CHEILECTOMY HALLUX RIGIDUS; GREAT TOE CARTIVA IMPLANT;  Surgeon: Karl Vargas DPM;  Location: [facility];  Service: Podiatry       Past OB/Gyn History:   No LMP recorded  Patient has had a hysterectomy  Menstrual History:  OB History        3    Para   3    Term   3            AB        Living   3       SAB        TAB        Ectopic        Multiple        Live Births   3                  No LMP recorded  Patient has had a hysterectomy  Menstrual history: Patient is post menopausal    History of sexually transmitted infection: No  Patient is currently sexually active  C/o vaginal dryness      Birth control: postmenopausal  Last Pap  2018 neg per patient    Family History   Problem Relation Age of Onset    No Known Problems Mother     Prostate cancer Father     Hypertension Father     Stroke Father        Social History:  Social History     Socioeconomic History    Marital status: /Civil Union     Spouse name: Not on file    Number of children: Not on file    Years of education: Not on file    Highest education level: Not on file   Occupational History    Not on file   Social Needs    Financial resource strain: Not on file    Food insecurity:     Worry: Not on file     Inability: Not on file    Transportation needs:     Medical: Not on file     Non-medical: Not on file   Tobacco Use    Smoking status: Never Smoker    Smokeless tobacco: Never Used   Substance and Sexual Activity    Alcohol use: No    Drug use: No    Sexual activity: Yes     Partners: Male     Birth control/protection: Post-menopausal   Lifestyle    Physical activity:     Days per week: Not on file     Minutes per session: Not on file    Stress: Not on file   Relationships    Social connections:     Talks on phone: Not on file     Gets together: Not on file     Attends Hoahaoism service: Not on file     Active member of club or organization: Not on file     Attends meetings of clubs or organizations: Not on file     Relationship status: Not on file    Intimate partner violence:     Fear of current or ex partner: Not on file     Emotionally abused: Not on file     Physically abused: Not on file     Forced sexual activity: Not on file   Other Topics Concern    Not on file   Social History Narrative    Not on file     Presently lives with   Patient is   Patient is currently employed  No Known Allergies      Current Outpatient Medications:     aspirin 81 MG tablet, Take 81 mg by mouth daily  , Disp: , Rfl:     Cholecalciferol 1000 units capsule, Take 1,000 Units by mouth daily  , Disp: , Rfl:     Coenzyme Q10 10 MG capsule, Once daily, Disp: , Rfl:     Glucosamine 750 MG TABS, Take 300 mg by mouth daily  , Disp: , Rfl:     losartan (COZAAR) 50 mg tablet, Take 50 mg by mouth daily, Disp: , Rfl:     Multiple Vitamins-Minerals (MULTI COMPLETE) CAPS, Take by mouth daily  , Disp: , Rfl:     Omega-3 Fatty Acids ([employer] FISH OIL) 1000 MG CAPS, Take by mouth daily  , Disp: , Rfl:     Probiotic Product (PROBIOTIC-10 PO), Take by mouth daily  , Disp: , Rfl:     TURMERIC CURCUMIN PO, by Does not apply route daily  , Disp: , Rfl:     Review of Systems:  Review of Systems   Constitutional: Negative for activity change, appetite change, chills, fatigue, fever and unexpected weight change  HENT: Negative for hearing loss, mouth sores and nosebleeds  Eyes: Negative for visual disturbance  Respiratory: Negative for chest tightness, shortness of breath and wheezing  Cardiovascular: Negative for chest pain, palpitations and leg swelling     Gastrointestinal: Negative for abdominal distention, abdominal pain, blood in stool, constipation, diarrhea, nausea and vomiting  Endocrine: Negative for cold intolerance and heat intolerance  Genitourinary: Negative for difficulty urinating, dyspareunia, dysuria, flank pain, frequency, genital sores, hematuria, menstrual problem, pelvic pain, urgency, vaginal bleeding, vaginal discharge and vaginal pain  Musculoskeletal: Negative for back pain, myalgias and neck pain  Allergic/Immunologic: Negative for immunocompromised state  Neurological: Negative for dizziness, seizures, syncope, weakness, light-headedness and headaches  Hematological: Negative for adenopathy  Does not bruise/bleed easily  Psychiatric/Behavioral: Negative for agitation, behavioral problems, confusion, self-injury, sleep disturbance and suicidal ideas  The patient is not nervous/anxious  Physical Exam:  /60   Ht 5' 5" (1 651 m)   Wt 65 3 kg (144 lb)   BMI 23 96 kg/m²    Physical Exam   Constitutional: She is oriented to person, place, and time  Vital signs are normal  She appears well-developed and well-nourished  Genitourinary: Vagina normal  Pelvic exam was performed with patient supine  There is no rash, tenderness or lesion on the right labia  There is no rash, tenderness or lesion on the left labia  No tenderness or bleeding in the vagina  No vaginal discharge found  Right adnexum does not display mass, does not display tenderness and does not display fullness  Left adnexum does not display mass, does not display tenderness and does not display fullness  Cervix does not exhibit motion tenderness  Genitourinary Comments: Absent uterus  No bladder tenderness  HENT:   Head: Normocephalic  Neck: No thyromegaly present  Cardiovascular: Normal rate, regular rhythm and normal heart sounds  Pulmonary/Chest: Effort normal and breath sounds normal  No respiratory distress   Right breast exhibits no inverted nipple, no mass, no nipple discharge, no skin change and no tenderness  Left breast exhibits no inverted nipple, no mass, no nipple discharge, no skin change and no tenderness  Scar from mastectomy and reconstruction   Abdominal: Soft  She exhibits no distension  There is no tenderness  Neurological: She is alert and oriented to person, place, and time  Psychiatric: She has a normal mood and affect  Her behavior is normal    Vitals reviewed

## 2019-07-24 LAB
HPV HR 12 DNA CVX QL NAA+PROBE: NEGATIVE
HPV16 DNA CVX QL NAA+PROBE: NEGATIVE
HPV18 DNA CVX QL NAA+PROBE: NEGATIVE

## 2019-07-25 LAB
LAB AP GYN PRIMARY INTERPRETATION: NORMAL
Lab: NORMAL

## 2019-08-27 ENCOUNTER — HOSPITAL ENCOUNTER (OUTPATIENT)
Dept: MRI IMAGING | Facility: HOSPITAL | Age: 64
Discharge: HOME | End: 2019-08-27
Payer: COMMERCIAL

## 2019-08-27 DIAGNOSIS — D49.6 BRAIN NEOPLASM (HCC): ICD-10-CM

## 2019-08-27 PROCEDURE — A9585 GADOBUTROL INJECTION: HCPCS

## 2019-08-27 PROCEDURE — 70553 MRI BRAIN STEM W/O & W/DYE: CPT

## 2019-08-27 RX ADMIN — GADOBUTROL 6 ML: 604.72 INJECTION INTRAVENOUS at 07:07

## 2019-12-03 ENCOUNTER — APPOINTMENT (OUTPATIENT)
Dept: CT IMAGING | Facility: HOSPITAL | Age: 64
End: 2019-12-03
Attending: INTERNAL MEDICINE
Payer: COMMERCIAL

## 2019-12-03 ENCOUNTER — HOSPITAL ENCOUNTER (OUTPATIENT)
Dept: ULTRASOUND IMAGING | Facility: HOSPITAL | Age: 64
Discharge: HOME | End: 2019-12-03
Attending: OBSTETRICS & GYNECOLOGY
Payer: COMMERCIAL

## 2019-12-03 ENCOUNTER — APPOINTMENT (OUTPATIENT)
Dept: ULTRASOUND IMAGING | Facility: HOSPITAL | Age: 64
End: 2019-12-03
Attending: INTERNAL MEDICINE
Payer: COMMERCIAL

## 2019-12-03 DIAGNOSIS — R10.2 PELVIC PAIN: ICD-10-CM

## 2019-12-03 PROCEDURE — 76856 US EXAM PELVIC COMPLETE: CPT

## 2019-12-03 PROCEDURE — 76830 TRANSVAGINAL US NON-OB: CPT

## 2019-12-07 LAB — CANCER AG27-29 SERPL-ACNC: 53 U/ML

## 2019-12-13 DIAGNOSIS — N83.202 LEFT OVARIAN CYST: Primary | ICD-10-CM

## 2019-12-14 NOTE — RESULT ENCOUNTER NOTE
1cm cyst on left ovary  Unlikely to cause pain and most likely benign based on appearance  Recommend f/u US in one year, order placed

## 2019-12-16 ENCOUNTER — TELEPHONE (OUTPATIENT)
Dept: HEMATOLOGY ONCOLOGY | Facility: CLINIC | Age: 64
End: 2019-12-16

## 2019-12-16 NOTE — TELEPHONE ENCOUNTER
Per Dr Hiren Lewis patient should f/u with her PCP in regards to this matter  I LVM on patient's machine regarding this matter

## 2019-12-16 NOTE — TELEPHONE ENCOUNTER
Not scheduled for next u/s until June 2020--wants to know if she can have one yet in December? Please call to let her know    [phone]

## 2020-02-07 ENCOUNTER — TRANSCRIBE ORDERS (OUTPATIENT)
Dept: ADMINISTRATIVE | Facility: HOSPITAL | Age: 65
End: 2020-02-07

## 2020-02-07 DIAGNOSIS — N28.1 ACQUIRED CYST OF KIDNEY: Primary | ICD-10-CM

## 2020-02-17 ENCOUNTER — TELEPHONE (OUTPATIENT)
Dept: OBGYN CLINIC | Facility: CLINIC | Age: 65
End: 2020-02-17

## 2020-02-17 NOTE — TELEPHONE ENCOUNTER
Patient calling to review ultrasound sound results from 12/2019  Review results verbalized understanding

## 2020-03-23 ENCOUNTER — HOSPITAL ENCOUNTER (OUTPATIENT)
Dept: MRI IMAGING | Facility: HOSPITAL | Age: 65
Discharge: HOME | End: 2020-03-23
Payer: COMMERCIAL

## 2020-03-23 DIAGNOSIS — N28.1 ACQUIRED CYST OF KIDNEY: ICD-10-CM

## 2020-03-23 PROCEDURE — A9585 GADOBUTROL INJECTION: HCPCS | Performed by: RADIOLOGY

## 2020-03-23 PROCEDURE — 74183 MRI ABD W/O CNTR FLWD CNTR: CPT

## 2020-03-23 RX ADMIN — GADOBUTROL 6 ML: 604.72 INJECTION INTRAVENOUS at 17:52

## 2020-03-27 ENCOUNTER — TELEPHONE (OUTPATIENT)
Dept: UROLOGY | Facility: MEDICAL CENTER | Age: 65
End: 2020-03-27

## 2020-03-27 NOTE — TELEPHONE ENCOUNTER
Attempted to call patient to gather information about patients symptoms to get set up for appropriate appointment time, no answer at this time unable to leave message as there was no voicemail box set up

## 2020-03-27 NOTE — TELEPHONE ENCOUNTER
TRIAGE NEW PATIENT  Renal cyst Referral form Dr Radha Kenyon  For second opinion  [facility] [id number]  No previous urologist  Prefers the [facility] office  Hailey Wei can be reached at [phone]  Patient's note from Dr Radha Kenyon will be faxed to the [facility] office  Thank you

## 2020-03-27 NOTE — TELEPHONE ENCOUNTER
Patient referred by Dr Vahid Aggarwal for renal cysts  MRI performed 3/23/20: IMPRESSION:        1  Stable lobulated mildly complex exophytic cyst arising from the lower pole of the right kidney with a few thin enhancing septations and some internal debris as described above  No solid nodularity or thickened septations  Given the heterogeneity on   T1-weighted sequences of portion of the cyst, this is most consistent with a Bosniak 2F  Continued surveillance recommended with MRI  Next follow-up imaging recommended in 12 months      2   Bilateral simple and right proteinaceous versus hemorrhagic cysts      The study was marked in EPIC for significant notification      Please review and advise on appropriate consult appointment and with AP or MD

## 2020-03-27 NOTE — TELEPHONE ENCOUNTER
This is not urgent  Can be scheduled when our schedules allow for new routine consults   Can be seen by an AP or MD

## 2020-04-29 ENCOUNTER — TELEPHONE (OUTPATIENT)
Dept: HEMATOLOGY ONCOLOGY | Facility: CLINIC | Age: 65
End: 2020-04-29

## 2020-04-29 DIAGNOSIS — R91.8 LUNG NODULES: Primary | ICD-10-CM

## 2020-05-01 LAB
ALBUMIN SERPL-MCNC: 4.1 G/DL (ref 3.6–5.1)
ALBUMIN/GLOB SERPL: 1.6 (CALC) (ref 1–2.5)
ALP SERPL-CCNC: 79 U/L (ref 37–153)
ALT SERPL-CCNC: 18 U/L (ref 6–29)
AST SERPL-CCNC: 17 U/L (ref 10–35)
BASOPHILS # BLD AUTO: 30 CELLS/UL (ref 0–200)
BASOPHILS NFR BLD AUTO: 0.6 %
BILIRUB SERPL-MCNC: 0.5 MG/DL (ref 0.2–1.2)
BUN SERPL-MCNC: 21 MG/DL (ref 7–25)
BUN/CREAT SERPL: NORMAL (CALC) (ref 6–22)
CALCIUM SERPL-MCNC: 9.2 MG/DL (ref 8.6–10.4)
CANCER AG27-29 SERPL-ACNC: 47 U/ML
CHLORIDE SERPL-SCNC: 101 MMOL/L (ref 98–110)
CO2 SERPL-SCNC: 32 MMOL/L (ref 20–32)
CREAT SERPL-MCNC: 0.61 MG/DL (ref 0.5–0.99)
EOSINOPHIL # BLD AUTO: 190 CELLS/UL (ref 15–500)
EOSINOPHIL NFR BLD AUTO: 3.8 %
ERYTHROCYTE [DISTWIDTH] IN BLOOD BY AUTOMATED COUNT: 12.8 % (ref 11–15)
GLOBULIN SER CALC-MCNC: 2.5 G/DL (CALC) (ref 1.9–3.7)
GLUCOSE SERPL-MCNC: 90 MG/DL (ref 65–99)
HCT VFR BLD AUTO: 39.6 % (ref 35–45)
HGB BLD-MCNC: 13.1 G/DL (ref 11.7–15.5)
LYMPHOCYTES # BLD AUTO: 1385 CELLS/UL (ref 850–3900)
LYMPHOCYTES NFR BLD AUTO: 27.7 %
MCH RBC QN AUTO: 28.2 PG (ref 27–33)
MCHC RBC AUTO-ENTMCNC: 33.1 G/DL (ref 32–36)
MCV RBC AUTO: 85.3 FL (ref 80–100)
MONOCYTES # BLD AUTO: 450 CELLS/UL (ref 200–950)
MONOCYTES NFR BLD AUTO: 9 %
NEUTROPHILS # BLD AUTO: 2945 CELLS/UL (ref 1500–7800)
NEUTROPHILS NFR BLD AUTO: 58.9 %
PLATELET # BLD AUTO: 260 THOUSAND/UL (ref 140–400)
PMV BLD REES-ECKER: 10.8 FL (ref 7.5–12.5)
POTASSIUM SERPL-SCNC: 4.3 MMOL/L (ref 3.5–5.3)
PROT SERPL-MCNC: 6.6 G/DL (ref 6.1–8.1)
RBC # BLD AUTO: 4.64 MILLION/UL (ref 3.8–5.1)
SL AMB EGFR AFRICAN AMERICAN: 111 ML/MIN/1.73M2
SL AMB EGFR NON AFRICAN AMERICAN: 96 ML/MIN/1.73M2
SODIUM SERPL-SCNC: 138 MMOL/L (ref 135–146)
WBC # BLD AUTO: 5 THOUSAND/UL (ref 3.8–10.8)

## 2020-06-01 ENCOUNTER — HOSPITAL ENCOUNTER (OUTPATIENT)
Dept: ULTRASOUND IMAGING | Facility: HOSPITAL | Age: 65
Discharge: HOME | End: 2020-06-01
Attending: INTERNAL MEDICINE
Payer: COMMERCIAL

## 2020-06-01 DIAGNOSIS — Q61.02 MULTIPLE RENAL CYSTS: ICD-10-CM

## 2020-06-01 PROCEDURE — 76770 US EXAM ABDO BACK WALL COMP: CPT

## 2020-06-26 ENCOUNTER — TELEPHONE (OUTPATIENT)
Dept: HEMATOLOGY ONCOLOGY | Facility: CLINIC | Age: 65
End: 2020-06-26

## 2020-06-26 ENCOUNTER — OFFICE VISIT (OUTPATIENT)
Dept: HEMATOLOGY ONCOLOGY | Facility: CLINIC | Age: 65
End: 2020-06-26
Payer: COMMERCIAL

## 2020-06-26 VITALS
HEIGHT: 65 IN | TEMPERATURE: 98.9 F | SYSTOLIC BLOOD PRESSURE: 140 MMHG | HEART RATE: 90 BPM | BODY MASS INDEX: 24.07 KG/M2 | WEIGHT: 144.5 LBS | DIASTOLIC BLOOD PRESSURE: 82 MMHG | OXYGEN SATURATION: 96 % | RESPIRATION RATE: 16 BRPM

## 2020-06-26 DIAGNOSIS — Q61.02 MULTIPLE RENAL CYSTS: ICD-10-CM

## 2020-06-26 DIAGNOSIS — E04.1 THYROID NODULE: ICD-10-CM

## 2020-06-26 DIAGNOSIS — C50.911 MALIGNANT NEOPLASM OF RIGHT BREAST IN FEMALE, ESTROGEN RECEPTOR NEGATIVE, UNSPECIFIED SITE OF BREAST (HCC): Primary | ICD-10-CM

## 2020-06-26 DIAGNOSIS — R91.8 LUNG NODULES: ICD-10-CM

## 2020-06-26 DIAGNOSIS — Z17.1 MALIGNANT NEOPLASM OF RIGHT BREAST IN FEMALE, ESTROGEN RECEPTOR NEGATIVE, UNSPECIFIED SITE OF BREAST (HCC): Primary | ICD-10-CM

## 2020-06-26 DIAGNOSIS — R97.8 ABNORMAL TUMOR MARKERS: ICD-10-CM

## 2020-06-26 PROCEDURE — 99214 OFFICE O/P EST MOD 30 MIN: CPT | Performed by: INTERNAL MEDICINE

## 2020-06-26 RX ORDER — HYDROCHLOROTHIAZIDE 12.5 MG/1
TABLET ORAL
COMMUNITY
Start: 2020-06-21

## 2020-07-08 ENCOUNTER — CONSULT (OUTPATIENT)
Dept: UROLOGY | Facility: CLINIC | Age: 65
End: 2020-07-08
Payer: COMMERCIAL

## 2020-07-08 VITALS
BODY MASS INDEX: 24.59 KG/M2 | HEART RATE: 71 BPM | DIASTOLIC BLOOD PRESSURE: 78 MMHG | TEMPERATURE: 96.6 F | SYSTOLIC BLOOD PRESSURE: 124 MMHG | HEIGHT: 64 IN | WEIGHT: 144 LBS

## 2020-07-08 DIAGNOSIS — Q61.02 MULTIPLE RENAL CYSTS: Primary | ICD-10-CM

## 2020-07-08 PROCEDURE — 99244 OFF/OP CNSLTJ NEW/EST MOD 40: CPT | Performed by: UROLOGY

## 2020-07-08 NOTE — PROGRESS NOTES
Referring Physician: Delmer Ovalle MD  A copy of this note was sent to the referring physician  Diagnoses and all orders for this visit:    Multiple renal cysts  -     Creatinine, serum; Future  -     MRI abdomen w wo contrast; Future            Assessment and plan:       1  Renal cysts  -4 1 cm right lower pole nonenhancing cyst, Bosniak 2 F (internal septation, asymmetry)    We discussed the natural history of complex renal cysts  Using the Bosniak classification, class I cysts have little to no chance of posing a malignant threat  Class II cysts are malignant lesions 5-10% of the time  Class IIf cysts 30% of the time  Class III cysts 50%  Class IV cysts are malignant 90% of the time  That being said, we do not fully understand the natural history of this cysts, though we believe them to be of very low metastatic potential    Patient will follow up in 1 year with a MRI prior to the visit        Saintclair Lah, MD      Chief Complaint     Renal cyst      History of Present Illness     Clover Martin is a 60 y o  female referred in consultation for renal cyst   This is a very pleasant 52-year-old breast cancer survivor  She has been found incidentally to have a renal cyst actually in both kidneys dating back to 2018  The largest lesion measures 4 1 cm  He has been evaluated with to prior MRIs as well as an ultrasound  There is no evidence of a solid component  There is some asymmetry and internal septation but no enhancement  Overall consistent with a Bosniak 2 F lesion  She is asymptomatic from a genitourinary standpoint  Detailed Urologic History     - please refer to HPI    Review of Systems     Review of Systems   Constitutional: Negative for activity change and fatigue  HENT: Negative for congestion  Eyes: Negative for visual disturbance  Respiratory: Negative for shortness of breath and wheezing  Cardiovascular: Negative for chest pain and leg swelling  Gastrointestinal: Negative for abdominal pain  Endocrine: Negative for polyuria  Genitourinary: Negative for flank pain, hematuria and urgency  Musculoskeletal: Negative for back pain  Allergic/Immunologic: Negative for immunocompromised state  Neurological: Negative for dizziness and numbness  Psychiatric/Behavioral: Negative for dysphoric mood  All other systems reviewed and are negative  Allergies     No Known Allergies    Physical Exam     Physical Exam   Constitutional: She is oriented to person, place, and time  She appears well-developed  HENT:   Head: Normocephalic and atraumatic  Eyes: Pupils are equal, round, and reactive to light  Neck: Normal range of motion  Cardiovascular:   Date of lower extremity edema   Pulmonary/Chest: Effort normal and breath sounds normal    Abdominal: Soft  Genitourinary:   Genitourinary Comments: Negative CVA tenderness   Musculoskeletal: Normal range of motion  Neurological: She is alert and oriented to person, place, and time  Skin: Skin is warm  Psychiatric: She has a normal mood and affect             Vital Signs  Vitals:    07/08/20 1344   BP: 124/78   BP Location: Left arm   Patient Position: Sitting   Cuff Size: Adult   Pulse: 71   Temp: (!) 96 6 °F (35 9 °C)   Weight: 65 3 kg (144 lb)   Height: 5' 4" (1 626 m)         Current Medications       Current Outpatient Medications:     aspirin 81 MG tablet, Take 81 mg by mouth daily  , Disp: , Rfl:     Cholecalciferol 1000 units capsule, Take 1,000 Units by mouth daily  , Disp: , Rfl:     Coenzyme Q10 10 MG capsule, Once daily, Disp: , Rfl:     Glucosamine 750 MG TABS, Take 300 mg by mouth daily  , Disp: , Rfl:     hydrochlorothiazide (HYDRODIURIL) 12 5 mg tablet, , Disp: , Rfl:     losartan (COZAAR) 50 mg tablet, Take 50 mg by mouth daily, Disp: , Rfl:     Multiple Vitamins-Minerals (MULTI COMPLETE) CAPS, Take by mouth daily  , Disp: , Rfl:     Omega-3 Fatty Acids ([employer] FISH OIL) 1000 MG CAPS, Take by mouth daily  , Disp: , Rfl:     Probiotic Product (PROBIOTIC-10 PO), Take by mouth daily  , Disp: , Rfl:     TURMERIC CURCUMIN PO, by Does not apply route daily  , Disp: , Rfl:       Active Problems     Patient Active Problem List   Diagnosis    History of breast cancer    Abnormal tumor markers    Thyroid nodule    Lung nodules    Multiple renal cysts    Vitamin D deficiency    Multinodular non-toxic goiter    Cancer of breast, female ([facility] [address] )    Chronic lymphocytic thyroiditis         Past Medical History     Past Medical History:   Diagnosis Date    Arthritis     Bunion of great toe of right foot     Dental crown present     Environmental allergies     ragweed    Exercises 5 to 6 times per week     treadmill    GERD (gastroesophageal reflux disease)     History of kidney stones     History of Lyme disease     History of palpitations     Kidney cysts     "bilat to be followed in 6 months with CT Scan"    Kidney stone     Left arm pain     "occas related to lifting weights recently"    Limb alert care status     RIGHT ;no BP/IV's    Lung nodule     Malignant neoplasm of upper-outer quadrant of right female breast (HCC)     right    Motion sickness     Thyroid disorder     nodules/enlarged    Tooth missing     Wears glasses          Surgical History     Past Surgical History:   Procedure Laterality Date    ABDOMINOPLASTY       SECTION      COLONOSCOPY      ESOPHAGOGASTRODUODENOSCOPY      HYSTERECTOMY      KNEE ARTHROSCOPY Left     MASTECTOMY Right     OK HALLUX RIGIDUS W/CHEILECTOMY 1ST MP JT W/IMPLT Right 2018    Procedure: CHEILECTOMY HALLUX RIGIDUS; GREAT TOE CARTIVA IMPLANT;  Surgeon: Conchis Starr DPM;  Location: [facility];  Service: Podiatry         Family History     Family History   Problem Relation Age of Onset    No Known Problems Mother     Prostate cancer Father     Hypertension Father     Stroke Father          Social History     Social History     Social History     Tobacco Use   Smoking Status Never Smoker   Smokeless Tobacco Never Used         Pertinent Lab Values     Lab Results   Component Value Date    CREATININE 0 61 04/29/2020       No results found for: PSA    @RESULTRCNT(1H])@      Pertinent Imaging     FINDINGS:     LIVER:  The liver is normal in size and contour  There are no cystic or solid lesions identified  There are no areas of abnormal arterial enhancement  Suboptimal assessment for the focal fatty infiltration  The portal and hepatic veins are patent   without evidence of thrombosis      BILIARY TREE:  There is no intra-hepatic biliary ductal dilatation  The common bile duct is normal in caliber  There is no gross evidence of mass or choledocholithiasis      GALLBLADDER:  The gallbladder is normal in size and morphology  There is a layering density within the gallbladder probably due to sludge PANCREAS: The pancreas is normal in morphology and signal intensity  No cystic or solid mass is identified  The   pancreatic duct is normal in caliber      ADRENAL GLANDS:  The adrenal glands are normal in morphology without thickening or focal mass      SPLEEN:  The spleen is normal in size, morphology and signal intensity  No focal mass is identified      KIDNEYS:  Multiple left renal cysts are seen  On the recent to ultrasound of the mid right renal lesion was described with the nodularity  This lesion measures 2 7 x 2 4 cm  This demonstrates fluid fluid level on the T1 precontrast fat-sat images  Evaluation of this lesion is hampered due to lack   of availability of this in and out of phase images  There is no enhancing nodularity seen in this lesion the imaging appearance of this lesion is suggestive of for hemorrhagic cyst  Additional lesion seen in the upper pole of the right kidney demonstrates thin enhancing septation within it    This measures 2 1 x 2 0 cm      LOWER CHEST:   No gross evidence of mass or infiltrate      ABDOMINAL CAVITY: No lymphadenopathy or mass  No Ascites  Mesenteric fat is normal in signal without evidence of stranding or edema  No mesenteric nodularity or focal mass is identified      ABDOMINAL WALL:  No mass or hernia identified      BOWEL:   Limited evaluation of the hollow viscera demonstrates no gross obstruction or mass      OSSEOUS STRUCTURES:  T2 hyperintense foci noted within the lower thoracic spine in the T9 vertebra, T11 vertebra  These were also described on the previous thoracic MRI from June 6, 2014     VASCULAR STRUCTURES:  The visualized vascular structures are patent without evidence of thrombosis or external compression  No aneurysm is identified      IMPRESSION:  Mildly septated cyst within the anterior aspect of the right kidney in the upper pole, unchanged from the previous ultrasound  Can be evaluated for stability with follow-up at 6 months  Follow-up may be performed with ultrasound, Bosdavidk 2F        Additional lesion which demonstrated the nodular density within it in the mid right kidney likely represents a cyst with hemorrhagic debris or proteinaceous debris      No solid enhancing mass seen        Portions of the record may have been created with voice recognition software   Occasional wrong word or "sound a like" substitutions may have occurred due to the inherent limitations of voice recognition software   Read the chart carefully and recognize, using context, where substitutions have occurred

## 2020-07-08 NOTE — LETTER
July 8, 2020     Amari Dougherty MD  [address] [facility]    Patient: Ar Martin   YOB: 1955   Date of Visit: 7/8/2020       Dear Dr Wagner Getting: Thank you for referring Ar Martin to me for evaluation  Below are my notes for this consultation  If you have questions, please do not hesitate to call me  I look forward to following your patient along with you  Sincerely,        Fadia Yarbrough MD        CC: MD Fadia Hirsch MD  7/8/2020  2:07 PM  Sign at close encounter  Referring Physician: Amari Dougherty MD  A copy of this note was sent to the referring physician  Diagnoses and all orders for this visit:    Multiple renal cysts  -     Creatinine, serum; Future  -     MRI abdomen w wo contrast; Future            Assessment and plan:       1  Renal cysts  -4 1 cm right lower pole nonenhancing cyst, Bosniak 2 F (internal septation, asymmetry)    We discussed the natural history of complex renal cysts  Using the Bosniak classification, class I cysts have little to no chance of posing a malignant threat  Class II cysts are malignant lesions 5-10% of the time  Class IIf cysts 30% of the time  Class III cysts 50%  Class IV cysts are malignant 90% of the time  That being said, we do not fully understand the natural history of this cysts, though we believe them to be of very low metastatic potential    Patient will follow up in 1 year with a MRI prior to the visit        Fadia Yarbrough MD      Chief Complaint     Renal cyst      History of Present Illness     Clover Martin is a 60 y o  female referred in consultation for renal cyst   This is a very pleasant 31-year-old breast cancer survivor  She has been found incidentally to have a renal cyst actually in both kidneys dating back to 2018  The largest lesion measures 4 1 cm  He has been evaluated with to prior MRIs as well as an ultrasound    There is no evidence of a solid component  There is some asymmetry and internal septation but no enhancement  Overall consistent with a Bosniak 2 F lesion  She is asymptomatic from a genitourinary standpoint  Detailed Urologic History     - please refer to HPI    Review of Systems     Review of Systems   Constitutional: Negative for activity change and fatigue  HENT: Negative for congestion  Eyes: Negative for visual disturbance  Respiratory: Negative for shortness of breath and wheezing  Cardiovascular: Negative for chest pain and leg swelling  Gastrointestinal: Negative for abdominal pain  Endocrine: Negative for polyuria  Genitourinary: Negative for flank pain, hematuria and urgency  Musculoskeletal: Negative for back pain  Allergic/Immunologic: Negative for immunocompromised state  Neurological: Negative for dizziness and numbness  Psychiatric/Behavioral: Negative for dysphoric mood  All other systems reviewed and are negative  Allergies     No Known Allergies    Physical Exam     Physical Exam   Constitutional: She is oriented to person, place, and time  She appears well-developed  HENT:   Head: Normocephalic and atraumatic  Eyes: Pupils are equal, round, and reactive to light  Neck: Normal range of motion  Cardiovascular:   Date of lower extremity edema   Pulmonary/Chest: Effort normal and breath sounds normal    Abdominal: Soft  Genitourinary:   Genitourinary Comments: Negative CVA tenderness   Musculoskeletal: Normal range of motion  Neurological: She is alert and oriented to person, place, and time  Skin: Skin is warm  Psychiatric: She has a normal mood and affect             Vital Signs  Vitals:    07/08/20 1344   BP: 124/78   BP Location: Left arm   Patient Position: Sitting   Cuff Size: Adult   Pulse: 71   Temp: (!) 96 6 °F (35 9 °C)   Weight: 65 3 kg (144 lb)   Height: 5' 4" (1 626 m)         Current Medications       Current Outpatient Medications:     aspirin 81 MG tablet, Take 81 mg by mouth daily  , Disp: , Rfl:     Cholecalciferol 1000 units capsule, Take 1,000 Units by mouth daily  , Disp: , Rfl:     Coenzyme Q10 10 MG capsule, Once daily, Disp: , Rfl:     Glucosamine 750 MG TABS, Take 300 mg by mouth daily  , Disp: , Rfl:     hydrochlorothiazide (HYDRODIURIL) 12 5 mg tablet, , Disp: , Rfl:     losartan (COZAAR) 50 mg tablet, Take 50 mg by mouth daily, Disp: , Rfl:     Multiple Vitamins-Minerals (MULTI COMPLETE) CAPS, Take by mouth daily  , Disp: , Rfl:     Omega-3 Fatty Acids ([employer] FISH OIL) 1000 MG CAPS, Take by mouth daily  , Disp: , Rfl:     Probiotic Product (PROBIOTIC-10 PO), Take by mouth daily  , Disp: , Rfl:     TURMERIC CURCUMIN PO, by Does not apply route daily  , Disp: , Rfl:       Active Problems     Patient Active Problem List   Diagnosis    History of breast cancer    Abnormal tumor markers    Thyroid nodule    Lung nodules    Multiple renal cysts    Vitamin D deficiency    Multinodular non-toxic goiter    Cancer [facility] breast, female ([facility] [address]    Chronic lymphocytic thyroiditis         Past Medical History     Past Medical History:   Diagnosis Date    Arthritis     Bunion of great toe of right foot     Dental crown present     Environmental allergies     ragweed    Exercises 5 to 6 times per week     treadmill    GERD (gastroesophageal reflux disease)     History of kidney stones     History of Lyme disease     History of palpitations     Kidney cysts     "bilat to be followed in 6 months with CT Scan"    Kidney stone     Left arm pain     "occas related to lifting weights recently"    Limb alert care status     RIGHT ;no BP/IV's    Lung nodule     Malignant neoplasm of upper-outer quadrant of right female breast (HCC)     right    Motion sickness     Thyroid disorder     nodules/enlarged    Tooth missing     Wears glasses          Surgical History     Past Surgical History:   Procedure Laterality Date    ABDOMINOPLASTY       SECTION      COLONOSCOPY      ESOPHAGOGASTRODUODENOSCOPY      HYSTERECTOMY      KNEE ARTHROSCOPY Left     MASTECTOMY Right     WA HALLUX RIGIDUS W/CHEILECTOMY 1ST MP JT W/IMPLT Right 2018    Procedure: CHEILECTOMY HALLUX RIGIDUS; GREAT TOE CARTIVA IMPLANT;  Surgeon: Xiomy Wick DPM;  Location: [facility];  Service: Podiatry         Family History     Family History   Problem Relation Age of Onset    No Known Problems Mother     Prostate cancer Father     Hypertension Father     Stroke Father          Social History     Social History     Social History     Tobacco Use   Smoking Status Never Smoker   Smokeless Tobacco Never Used         Pertinent Lab Values     Lab Results   Component Value Date    CREATININE 0 61 2020       No results found for: PSA    @RESULTRCNT(1H])@      Pertinent Imaging     FINDINGS:     LIVER:  The liver is normal in size and contour  There are no cystic or solid lesions identified  There are no areas of abnormal arterial enhancement  Suboptimal assessment for the focal fatty infiltration  The portal and hepatic veins are patent   without evidence of thrombosis      BILIARY TREE:  There is no intra-hepatic biliary ductal dilatation  The common bile duct is normal in caliber  There is no gross evidence of mass or choledocholithiasis      GALLBLADDER:  The gallbladder is normal in size and morphology  There is a layering density within the gallbladder probably due to sludge PANCREAS: The pancreas is normal in morphology and signal intensity  No cystic or solid mass is identified  The   pancreatic duct is normal in caliber      ADRENAL GLANDS:  The adrenal glands are normal in morphology without thickening or focal mass      SPLEEN:  The spleen is normal in size, morphology and signal intensity   No focal mass is identified      KIDNEYS:  Multiple left renal cysts are seen  On the recent to ultrasound of the mid right renal lesion was described with the nodularity  This lesion measures 2 7 x 2 4 cm  This demonstrates fluid fluid level on the T1 precontrast fat-sat images  Evaluation of this lesion is hampered due to lack   of availability of this in and out of phase images  There is no enhancing nodularity seen in this lesion the imaging appearance of this lesion is suggestive of for hemorrhagic cyst  Additional lesion seen in the upper pole of the right kidney demonstrates thin enhancing septation within it  This measures 2 1 x 2 0 cm      LOWER CHEST:   No gross evidence of mass or infiltrate      ABDOMINAL CAVITY: No lymphadenopathy or mass  No Ascites  Mesenteric fat is normal in signal without evidence of stranding or edema  No mesenteric nodularity or focal mass is identified      ABDOMINAL WALL:  No mass or hernia identified      BOWEL:   Limited evaluation of the hollow viscera demonstrates no gross obstruction or mass      OSSEOUS STRUCTURES:  T2 hyperintense foci noted within the lower thoracic spine in the T9 vertebra, T11 vertebra  These were also described on the previous thoracic MRI from June 6, 2014     VASCULAR STRUCTURES:  The visualized vascular structures are patent without evidence of thrombosis or external compression  No aneurysm is identified      IMPRESSION:  Mildly septated cyst within the anterior aspect of the right kidney in the upper pole, unchanged from the previous ultrasound  Can be evaluated for stability with follow-up at 6 months    Follow-up may be performed with ultrasound, Bosniak 2F        Additional lesion which demonstrated the nodular density within it in the mid right kidney likely represents a cyst with hemorrhagic debris or proteinaceous debris      No solid enhancing mass seen        Portions of the record may have been created with voice recognition software   Occasional wrong word or "sound a like" substitutions may have occurred due to the inherent limitations of voice recognition software   Read the chart carefully and recognize, using context, where substitutions have occurred

## 2020-08-17 ENCOUNTER — ANNUAL EXAM (OUTPATIENT)
Dept: OBGYN CLINIC | Facility: CLINIC | Age: 65
End: 2020-08-17
Payer: COMMERCIAL

## 2020-08-17 VITALS
WEIGHT: 141 LBS | HEART RATE: 94 BPM | HEIGHT: 65 IN | SYSTOLIC BLOOD PRESSURE: 102 MMHG | BODY MASS INDEX: 23.49 KG/M2 | TEMPERATURE: 98.1 F | DIASTOLIC BLOOD PRESSURE: 64 MMHG

## 2020-08-17 DIAGNOSIS — Z12.31 ENCOUNTER FOR SCREENING MAMMOGRAM FOR MALIGNANT NEOPLASM OF BREAST: ICD-10-CM

## 2020-08-17 DIAGNOSIS — Z01.419 WOMEN'S ANNUAL ROUTINE GYNECOLOGICAL EXAMINATION: Primary | ICD-10-CM

## 2020-08-17 DIAGNOSIS — N83.202 LEFT OVARIAN CYST: ICD-10-CM

## 2020-08-17 PROCEDURE — S0612 ANNUAL GYNECOLOGICAL EXAMINA: HCPCS | Performed by: OBSTETRICS & GYNECOLOGY

## 2020-08-17 NOTE — PROGRESS NOTES
ASSESSMENT & PLAN:   Diagnoses and all orders for this visit:    Women's annual routine gynecological examination    Encounter for screening mammogram for malignant neoplasm of breast  -     Mammo screening bilateral w cad; Future - ordered by Dr Serena Addison    Left ovarian cyst  -     US pelvis complete w transvaginal; Future  - Yearly f/u      Atrophic vaginitis    - concerned to use hormonal options d/t h/ o breast cancer     - given information on non hormonal options  The following were reviewed in today's visit: Current ASCCP Guidelines, breast self exam, menopause, exercise and healthy diet  Patient to return to office yearly for annual exam      All questions have been answered to her satisfaction  CC:  Annual Gynecologic Examination    HPI: Jayne Sabillon is a 60 y o  [id number] who presents for annual gynecologic examination  She has the following concerns:  No concerns  Health Maintenance:    She exercises 7 days per week  She wears her seatbelt routinely  She does perform regular monthly self breast exams  Patient tries to follow a balanced diet      Last mammogram: 2019 - Dr Serena Addison, ho breast cancer  Last colonoscopy: 2017 - 5 yr f/u       Past Medical History:   Diagnosis Date    Arthritis     Bunion of great toe of right foot     Dental crown present     Environmental allergies     ragweed    Exercises 5 to 6 times per week     treadmill    GERD (gastroesophageal reflux disease)     History of kidney stones     History of Lyme disease     History of palpitations     Kidney cysts     "bilat to be followed in 6 months with CT Scan"    Kidney stone     Left arm pain     "occas related to lifting weights recently"    Limb alert care status     RIGHT ;no BP/IV's    Lung nodule     Malignant neoplasm of upper-outer quadrant of right female breast ([address] )     right    Motion sickness     Thyroid disorder     nodules/enlarged    Tooth missing     Wears glasses Past Surgical History:   Procedure Laterality Date    ABDOMINOPLASTY       SECTION      COLONOSCOPY      ESOPHAGOGASTRODUODENOSCOPY      HYSTERECTOMY      KNEE ARTHROSCOPY Left     MASTECTOMY Right     AK HALLUX RIGIDUS W/CHEILECTOMY 1ST MP JT W/IMPLT Right 2018    Procedure: CHEILECTOMY HALLUX RIGIDUS; GREAT TOE CARTIVA IMPLANT;  Surgeon: Loren Hashimoto, DPM;  Location: [facility];  Service: Podiatry       Past OB/Gyn History:   No LMP recorded  Patient has had a hysterectomy  Patient is post menopausal    History of sexually transmitted infection: No  Patient is currently sexually active      Birth control: postmenopausal  Last Pap 2019 NILM  Menopausal symptoms: dryness    Family History   Problem Relation Age of Onset    No Known Problems Mother     Prostate cancer Father     Hypertension Father     Stroke Father        Social History:  Social History     Socioeconomic History    Marital status: /Civil Union     Spouse name: Not on file    Number of children: Not on file    Years of education: Not on file    Highest education level: Not on file   Occupational History    Not on file   Social Needs    Financial resource strain: Not on file    Food insecurity     Worry: Not on file     Inability: Not on file   [employer] needs     Medical: Not on file     Non-medical: Not on file   Tobacco Use    Smoking status: Never Smoker    Smokeless tobacco: Never Used   Substance and Sexual Activity    Alcohol use: No    Drug use: No    Sexual activity: Yes     Partners: Male     Birth control/protection: Post-menopausal   Lifestyle    Physical activity     Days per week: Not on file     Minutes per session: Not on file    Stress: Not on file   Relationships    Social connections     Talks on phone: Not on file     Gets together: Not on file     Attends Nondenominational service: Not on file     Active member of club or organization: Not on file     Attends meetings of clubs or organizations: Not on file     Relationship status: Not on file    Intimate partner violence     Fear of current or ex partner: Not on file     Emotionally abused: Not on file     Physically abused: Not on file     Forced sexual activity: Not on file   Other Topics Concern    Not on file   Social History Narrative    Not on file     Presently lives with   She feels safe at home  Patient is currently retired  No Known Allergies      Current Outpatient Medications:     aspirin 81 MG tablet, Take 81 mg by mouth daily  , Disp: , Rfl:     Cholecalciferol 1000 units capsule, Take 1,000 Units by mouth daily  , Disp: , Rfl:     Coenzyme Q10 10 MG capsule, Once daily, Disp: , Rfl:     Glucosamine 750 MG TABS, Take 300 mg by mouth daily  , Disp: , Rfl:     hydrochlorothiazide (HYDRODIURIL) 12 5 mg tablet, , Disp: , Rfl:     losartan (COZAAR) 50 mg tablet, Take 100 mg by mouth daily , Disp: , Rfl:     Multiple Vitamins-Minerals (MULTI COMPLETE) CAPS, Take by mouth daily  , Disp: , Rfl:     Omega-3 Fatty Acids ([employer] FISH OIL) 1000 MG CAPS, Take by mouth daily  , Disp: , Rfl:     Probiotic Product (PROBIOTIC-10 PO), Take by mouth daily  , Disp: , Rfl:     TURMERIC CURCUMIN PO, by Does not apply route daily  , Disp: , Rfl:     Review of Systems:  Review of Systems   Constitutional: Negative for chills, fever and unexpected weight change  Respiratory: Negative for cough and shortness of breath  Cardiovascular: Negative for chest pain and palpitations  Gastrointestinal: Negative for abdominal distention, abdominal pain, blood in stool, constipation, nausea and vomiting  Genitourinary: Positive for vaginal pain  Negative for difficulty urinating, dyspareunia, dysuria, flank pain, genital sores, hematuria, pelvic pain, urgency, vaginal bleeding and vaginal discharge  Neurological: Negative for headaches           Physical Exam:  /64   Pulse 94   Temp 98 1 °F (36 7 °C)   Ht 5' 4 5" (1 638 m)   Wt 64 kg (141 lb)   BMI 23 83 kg/m²    Physical Exam  Constitutional:       General: She is awake  Appearance: Normal appearance  She is well-developed  Genitourinary:      Pelvic exam was performed with patient supine  Vulva, urethra, bladder and vagina normal       No vulval lesion, ulcerations or rash noted  No urethral prolapse present  Bladder is not distended or tender  No vaginal discharge, erythema, tenderness, bleeding, atrophy or prolapse  Vaginal exam comments: Absent uterus and cervix  Cuff intact  No palpable masses  No lesions visualized  No bladder tenderness         Cervix is absent  Uterus is absent  No right or left adnexal mass present  Right adnexa absent  Right adnexa not tender or full  Left adnexa not tender or full  HENT:      Head: Normocephalic and atraumatic  Neck:      Musculoskeletal: Neck supple  Cardiovascular:      Rate and Rhythm: Normal rate and regular rhythm  Heart sounds: Normal heart sounds  Pulmonary:      Effort: Pulmonary effort is normal  No tachypnea or respiratory distress  Breath sounds: Normal breath sounds  Chest:      Breasts:         Right: Normal  No inverted nipple, mass, nipple discharge, skin change or tenderness  Left: Normal  No inverted nipple, mass, nipple discharge, skin change or tenderness  Abdominal:      General: There is no distension  Palpations: Abdomen is soft  Tenderness: There is no abdominal tenderness  There is no guarding  Lymphadenopathy:      Upper Body:      Right upper body: No supraclavicular or axillary adenopathy  Left upper body: No supraclavicular or axillary adenopathy  Neurological:      General: No focal deficit present  Mental Status: She is alert and oriented to person, place, and time  Psychiatric:         Mood and Affect: Mood normal          Behavior: Behavior normal          Thought Content:  Thought content normal          Judgment: Judgment normal    Vitals signs reviewed

## 2020-09-14 ENCOUNTER — HOSPITAL ENCOUNTER (OUTPATIENT)
Dept: CT IMAGING | Facility: HOSPITAL | Age: 65
Discharge: HOME | End: 2020-09-14
Attending: INTERNAL MEDICINE
Payer: COMMERCIAL

## 2020-09-14 DIAGNOSIS — C50.911 MALIGNANT NEOPLASM OF RIGHT BREAST IN FEMALE, ESTROGEN RECEPTOR NEGATIVE, UNSPECIFIED SITE OF BREAST (HCC): ICD-10-CM

## 2020-09-14 DIAGNOSIS — R91.8 LUNG NODULES: ICD-10-CM

## 2020-09-14 DIAGNOSIS — Z17.1 MALIGNANT NEOPLASM OF RIGHT BREAST IN FEMALE, ESTROGEN RECEPTOR NEGATIVE, UNSPECIFIED SITE OF BREAST (HCC): ICD-10-CM

## 2020-09-14 PROCEDURE — 71250 CT THORAX DX C-: CPT

## 2020-10-19 ENCOUNTER — HOSPITAL ENCOUNTER (OUTPATIENT)
Dept: ULTRASOUND IMAGING | Facility: HOSPITAL | Age: 65
Discharge: HOME | End: 2020-10-19
Attending: OBSTETRICS & GYNECOLOGY
Payer: COMMERCIAL

## 2020-10-19 DIAGNOSIS — N83.202 LEFT OVARIAN CYST: ICD-10-CM

## 2020-10-19 PROCEDURE — 76856 US EXAM PELVIC COMPLETE: CPT

## 2020-10-19 PROCEDURE — 76830 TRANSVAGINAL US NON-OB: CPT

## 2020-11-10 LAB — CANCER AG27-29 SERPL-ACNC: 47 U/ML

## 2021-02-12 ENCOUNTER — TELEPHONE (OUTPATIENT)
Dept: OBGYN CLINIC | Facility: CLINIC | Age: 66
End: 2021-02-12

## 2021-02-12 NOTE — TELEPHONE ENCOUNTER
Left message on nurse line states she gets yearly ultrasounds of left ovary  She would like a script    RC advised Dr Ayana Xei is out of the office until Tuesday when she gets back we will get in touch with her  She would like script mailed

## 2021-02-15 DIAGNOSIS — N83.202 LEFT OVARIAN CYST: Primary | ICD-10-CM

## 2021-02-15 NOTE — TELEPHONE ENCOUNTER
Left message on voicemail order has been placed for pelvic ultrasound  Please call and schedule for October 2021   Script mailed per patient request

## 2021-02-16 ENCOUNTER — TELEPHONE (OUTPATIENT)
Dept: HEMATOLOGY ONCOLOGY | Facility: CLINIC | Age: 66
End: 2021-02-16

## 2021-02-16 DIAGNOSIS — E55.9 VITAMIN D DEFICIENCY: Primary | ICD-10-CM

## 2021-02-16 NOTE — TELEPHONE ENCOUNTER
LVM informing patient that the lab script was mailed to the address provided, patient was instructed to call our office if she has any questions

## 2021-02-16 NOTE — TELEPHONE ENCOUNTER
Patient would like a Vitamin D lab order added    Patient would also like to have [facility] [address] [facility] [address]

## 2021-02-26 ENCOUNTER — TELEPHONE (OUTPATIENT)
Dept: HEMATOLOGY ONCOLOGY | Facility: CLINIC | Age: 66
End: 2021-02-26

## 2021-02-26 NOTE — TELEPHONE ENCOUNTER
Patient requests all lab scripts be mailed to patient for OV appt  Will send to Dr Linda Veliz MA  Patient aware of plan

## 2021-03-10 DIAGNOSIS — Z23 ENCOUNTER FOR IMMUNIZATION: ICD-10-CM

## 2021-05-03 NOTE — PATIENT INSTRUCTIONS
VAGINAL DRYNESS OVERVIEW    Vaginal dryness is a common condition in postmenopausal women (ie, women who have been through menopause)  This condition is also common in women who have had both of their ovaries surgically removed, for example, to treat or prevent cancer  You may hear your health care provider use the term "atrophic vaginitis" or "genitourinary syndrome of menopause "    In some women, vaginal dryness leads to uncomfortable symptoms, such as pain with sex, burning vaginal discomfort or itching, or abnormal vaginal discharge  There may also be related urinary symptoms, such as frequent or painful urination  Other women do not notice bothersome symptoms at all  Fortunately, there are several effective treatments available  VAGINAL DRYNESS CAUSES    The hormone estrogen helps to keep the vagina moist, maintain thickness of the vaginal lining, and keep the tissue flexible (also called "elasticity")  Vaginal dryness occurs when your body produces a decreased amount of estrogen  This can be permanent or temporary and can occur at different times throughout life, such as:    ?At the time of menopause (one year after monthly periods stop)  ? After surgical removal of the ovaries, chemotherapy, or radiation therapy of the pelvis to treat cancer  ? After having a baby in women who are breastfeeding  Estrogen production returns to normal when breastfeeding becomes less frequent or is stopped  ? While using certain medications, such as danazol, medroxyprogesterone (brand names: Provera, Depo-Provera), leuprolide (brand name: Lupron), or nafarelin  When these medications are stopped, estrogen production usually returns to normal     VAGINAL DRYNESS TREATMENT    There are several treatment options for women with vaginal dryness  Some, such as vaginal moisturizers or lubricants, are available without a prescription   Others require a prescription, including a vaginal estrogen cream, tablet, capsule, or ring; an oral medication called ospemifene; and a vaginal tablet called prasterone  These treatments usually work temporarily; your symptoms will return when the treatment is stopped unless your ovaries start producing more estrogen again  Vaginal lubricants and moisturizers  You can buy these without a prescription in most pharmacies  Vaginal lubricants and moisturizers do not contain any hormones and have virtually no systemic (body-wide) side effects  Possible local side effects include irritation or a burning feeling after application  ?Lubricants are designed to reduce friction and discomfort from dryness during sexual intercourse  The lubricant is applied inside the vagina and/or on the partner's penis or fingers just before sex  Products sold specifically as vaginal lubricants are more effective than lubricants that are not designed for this purpose, such as petroleum jelly  In addition, oil-based lubricants like petroleum jelly, baby oil, or mineral oil can damage latex condoms and/or diaphragms and make them less effective in preventing pregnancy or sexually transmitted infections  Lubricants that are made with water or silicones can be used with latex condoms and diaphragms  Polyurethane condoms can be used with oil-based products  Natural lubricants, such as olive, coconut, avocado, or peanut oil, are easily available products that may be used as a lubricant with sex  However, it is important to know that, like the oil-based lubricants, natural oils are not recommended for use with latex condoms or diaphragms, as they can damage the latex  Water- or silicone-based lubricants are a better choice if you use condoms or a diaphragm  ? Vaginal moisturizers are formulated to allow the vaginal tissues to retain moisture more effectively  Moisturizers are applied into the vagina approximately three times weekly to allow a continuous moisturizing effect   Be sure to check the label to ensure that you are purchasing a moisturizer and not a lubricant  Hand and body lotions and moisturizers should not be used to relieve vaginal dryness since they can be irritating to the vaginal tissues  Vaginal estrogen  Vaginal estrogen is one of the most effective treatment options for vaginal dryness  Vaginal estrogen requires a prescription from your health care provider  Some women worry about possible side effects from hormones  Very low doses of estrogen can be used to treat vaginal dryness when it is in the form of a vaginal cream or insertable tablet, capsule, or ring  A small amount of estrogen is absorbed into the bloodstream with these vaginal forms, but when used regularly, the level of estrogen in the blood is in the same range as in postmenopausal women who are not using vaginal estrogen  As a result, there is a much lower risk of the side effects people sometimes worry about, such as blood clots, breast cancer, and heart attack, compared with other estrogen-containing products (eg, birth control pills, menopausal hormone therapy)  Several types of vaginal estrogen products are available:    ? Cream  Estrogen cream is measured with an applicator and inserted into the vagina (if inserting the applicator is uncomfortable, you can also use your finger)  The cream is usually inserted every day for two weeks and then one or two times weekly after that  With some doses, you may also need to a take another hormonal medication, called a progestin  This is to prevent the lining of your uterus from building up and becoming precancerous or cancerous  Ask your health care provider whether you need to take a progestin with the vaginal estrogen cream you are using  ?Tablet or capsule  The vaginal estrogen insert is a small tablet or capsule that you put in your vagina  It comes packaged in a disposable applicator  It is usually inserted every day for two weeks and then twice weekly after that   Some women find it uncomfortable to insert the applicator at first, when vaginal dryness is at its worst; if you have this problem, ask your health care provider if you should use another form of vaginal estrogen  ? Ring  The vaginal estrogen ring (brand name: Estring) is a flexible plastic ring you wear inside your vagina all the time  You replace it with a new ring every three months  The ring does not need to be removed during sex or bathing  It cannot be felt by most women or their sexual partners  In women who have previously had a hysterectomy, the ring will sometimes fall out  The estrogen ring used to treat vaginal dryness should not be confused with a different estrogen replacement vaginal ring (brand name: Otis Real), which releases a much higher dose of estrogen  The estrogen in the higher dose ring is intended to be absorbed into the body to relieve hot flashes in women going through menopause  How long can I use vaginal estrogen?  As low-dose vaginal estrogen is associated with few adverse effects, it can probably be used indefinitely, although there are no long-term studies to provide data about the effects over many years  Is vaginal estrogen safe if I have a history of breast cancer?  The safety of vaginal estrogen in women who have a past history of breast cancer is debated  A small amount of estrogen can be absorbed from the vagina into the bloodstream  If you have a history of breast cancer, talk to your health care provider or oncologist about the potential risks and benefits of vaginal estrogen  Prasterone (dehydroepiandrosterone)  Prasterone, also known as dehydroepiandrosterone (DHEA), is also an option for women with vaginal dryness due to menopause  It comes in the form of a suppository that you insert into your vagina once a day   Vaginal estrogen therapy is more commonly used than prasterone because vaginal estrogen has been studied more thoroughly and the dosing schedule may be more convenient  However, prasterone is an option for women who cannot take estrogen or prefer to avoid it, but who can use other vaginal hormones  Ospemifene  Ospemifene is a prescription medication that is similar to estrogen but is not estrogen  In the vaginal tissue, it acts similarly to estrogen  It comes in a pill and is prescribed for women who want to use an estrogen-like medication for vaginal dryness but prefer not to use (or have trouble inserting) a vaginal medication  Ospemifene may cause hot flashes as a side effect; it may also increase the risk of blood clots or uterine cancer  Long-term studies of ospemifene are needed to evaluate the risk of these complications  This medication has not been tested in women who have had breast cancer or are at a high risk of developing breast cancer  Sexual activity  Vaginal estrogen improves dryness quickly, usually within a few weeks  If sex is not uncomfortable, you may continue to have sex as you treat vaginal dryness  Moonshine may help the vaginal tissues by keeping them soft and stretchable  If sex continues to be painful despite treatment for vaginal dryness, talk to your health care provider  There may be other things you can try such as pelvic floor physical therapy       [employer] br Divya Bowling

## 2021-05-11 ENCOUNTER — APPOINTMENT (OUTPATIENT)
Dept: LAB | Facility: HOSPITAL | Age: 66
End: 2021-05-11
Attending: INTERNAL MEDICINE
Payer: MEDICARE

## 2021-05-11 ENCOUNTER — TRANSCRIBE ORDERS (OUTPATIENT)
Dept: LAB | Facility: HOSPITAL | Age: 66
End: 2021-05-11

## 2021-05-11 DIAGNOSIS — N28.1 ACQUIRED CYST OF KIDNEY: Primary | ICD-10-CM

## 2021-05-11 DIAGNOSIS — N28.1 ACQUIRED CYST OF KIDNEY: ICD-10-CM

## 2021-05-11 LAB
BACTERIA UR QL AUTO: ABNORMAL /HPF
BILIRUB UR QL STRIP: NEGATIVE
CLARITY UR: CLEAR
COLOR UR: YELLOW
GLUCOSE UR STRIP-MCNC: NEGATIVE MG/DL
HGB UR QL STRIP.AUTO: ABNORMAL
KETONES UR STRIP-MCNC: NEGATIVE MG/DL
LEUKOCYTE ESTERASE UR QL STRIP: ABNORMAL
NITRITE UR QL STRIP: NEGATIVE
NON-SQ EPI CELLS URNS QL MICRO: ABNORMAL /HPF
PH UR STRIP.AUTO: 6 [PH]
PROT UR STRIP-MCNC: NEGATIVE MG/DL
RBC #/AREA URNS AUTO: ABNORMAL /HPF
SP GR UR STRIP.AUTO: 1.02 (ref 1–1.03)
UROBILINOGEN UR QL STRIP.AUTO: 0.2 E.U./DL
WBC #/AREA URNS AUTO: ABNORMAL /HPF

## 2021-05-11 PROCEDURE — 87086 URINE CULTURE/COLONY COUNT: CPT

## 2021-05-11 PROCEDURE — 81001 URINALYSIS AUTO W/SCOPE: CPT

## 2021-05-12 LAB — BACTERIA UR CULT: NORMAL

## 2021-05-28 ENCOUNTER — TELEPHONE (OUTPATIENT)
Dept: HEMATOLOGY ONCOLOGY | Facility: CLINIC | Age: 66
End: 2021-05-28

## 2021-05-28 ENCOUNTER — TELEPHONE (OUTPATIENT)
Dept: UROLOGY | Facility: MEDICAL CENTER | Age: 66
End: 2021-05-28

## 2021-05-28 DIAGNOSIS — E04.1 THYROID NODULE: Primary | ICD-10-CM

## 2021-05-28 NOTE — TELEPHONE ENCOUNTER
Patient of Dr Benny Owen in [place]    Patient called stating she is having testing ordered to do in July  Patient would like to know if she can have it done in earlyJune  She would need the prescriptions updated  She will keep her appointment for July 14th  She would like a call back to know when to schedule mri       Patient can be reached [phone] (H)

## 2021-05-28 NOTE — TELEPHONE ENCOUNTER
Lab order placed, spoke to patient and informed her that the lab is in the system  Patient voiced understanding

## 2021-05-28 NOTE — TELEPHONE ENCOUNTER
Expected date for MRI changed to mid June  (6/15/21)    Please advise patient she can have MRI done in June in prep for July appt

## 2021-06-09 ENCOUNTER — APPOINTMENT (OUTPATIENT)
Dept: LAB | Facility: CLINIC | Age: 66
End: 2021-06-09
Payer: MEDICARE

## 2021-06-09 ENCOUNTER — HOSPITAL ENCOUNTER (OUTPATIENT)
Dept: MRI IMAGING | Facility: HOSPITAL | Age: 66
Discharge: HOME | End: 2021-06-09
Attending: UROLOGY
Payer: MEDICARE

## 2021-06-09 DIAGNOSIS — E55.9 VITAMIN D DEFICIENCY: ICD-10-CM

## 2021-06-09 DIAGNOSIS — C50.911 MALIGNANT NEOPLASM OF RIGHT BREAST IN FEMALE, ESTROGEN RECEPTOR NEGATIVE, UNSPECIFIED SITE OF BREAST (HCC): ICD-10-CM

## 2021-06-09 DIAGNOSIS — Z17.1 MALIGNANT NEOPLASM OF RIGHT BREAST IN FEMALE, ESTROGEN RECEPTOR NEGATIVE, UNSPECIFIED SITE OF BREAST (HCC): ICD-10-CM

## 2021-06-09 DIAGNOSIS — E04.1 THYROID NODULE: ICD-10-CM

## 2021-06-09 DIAGNOSIS — Q61.02 MULTIPLE RENAL CYSTS: ICD-10-CM

## 2021-06-09 LAB
25(OH)D3 SERPL-MCNC: 33.9 NG/ML (ref 30–100)
ALBUMIN SERPL BCP-MCNC: 3.6 G/DL (ref 3.5–5)
ALP SERPL-CCNC: 82 U/L (ref 46–116)
ALT SERPL W P-5'-P-CCNC: 26 U/L (ref 12–78)
ANION GAP SERPL CALCULATED.3IONS-SCNC: 7 MMOL/L (ref 4–13)
AST SERPL W P-5'-P-CCNC: 17 U/L (ref 5–45)
BASOPHILS # BLD AUTO: 0.05 THOUSANDS/ΜL (ref 0–0.1)
BASOPHILS NFR BLD AUTO: 1 % (ref 0–1)
BILIRUB SERPL-MCNC: 0.29 MG/DL (ref 0.2–1)
BUN SERPL-MCNC: 24 MG/DL (ref 5–25)
CALCIUM SERPL-MCNC: 9 MG/DL (ref 8.3–10.1)
CANCER AG27-29 SERPL-ACNC: 50.4 U/ML (ref 0–42.3)
CHLORIDE SERPL-SCNC: 104 MMOL/L (ref 100–108)
CO2 SERPL-SCNC: 29 MMOL/L (ref 21–32)
CREAT SERPL-MCNC: 0.62 MG/DL (ref 0.6–1.3)
EOSINOPHIL # BLD AUTO: 0.39 THOUSAND/ΜL (ref 0–0.61)
EOSINOPHIL NFR BLD AUTO: 6 % (ref 0–6)
ERYTHROCYTE [DISTWIDTH] IN BLOOD BY AUTOMATED COUNT: 13.4 % (ref 11.6–15.1)
GFR SERPL CREATININE-BSD FRML MDRD: 95 ML/MIN/1.73SQ M
GLUCOSE P FAST SERPL-MCNC: 94 MG/DL (ref 65–99)
HCT VFR BLD AUTO: 40.2 % (ref 34.8–46.1)
HGB BLD-MCNC: 12.7 G/DL (ref 11.5–15.4)
IMM GRANULOCYTES # BLD AUTO: 0.02 THOUSAND/UL (ref 0–0.2)
IMM GRANULOCYTES NFR BLD AUTO: 0 % (ref 0–2)
LYMPHOCYTES # BLD AUTO: 1.63 THOUSANDS/ΜL (ref 0.6–4.47)
LYMPHOCYTES NFR BLD AUTO: 27 % (ref 14–44)
MCH RBC QN AUTO: 28 PG (ref 26.8–34.3)
MCHC RBC AUTO-ENTMCNC: 31.6 G/DL (ref 31.4–37.4)
MCV RBC AUTO: 89 FL (ref 82–98)
MONOCYTES # BLD AUTO: 0.46 THOUSAND/ΜL (ref 0.17–1.22)
MONOCYTES NFR BLD AUTO: 8 % (ref 4–12)
NEUTROPHILS # BLD AUTO: 3.59 THOUSANDS/ΜL (ref 1.85–7.62)
NEUTS SEG NFR BLD AUTO: 58 % (ref 43–75)
NRBC BLD AUTO-RTO: 0 /100 WBCS
PLATELET # BLD AUTO: 271 THOUSANDS/UL (ref 149–390)
PMV BLD AUTO: 9.9 FL (ref 8.9–12.7)
POTASSIUM SERPL-SCNC: 4 MMOL/L (ref 3.5–5.3)
PROT SERPL-MCNC: 7 G/DL (ref 6.4–8.2)
RBC # BLD AUTO: 4.54 MILLION/UL (ref 3.81–5.12)
SODIUM SERPL-SCNC: 140 MMOL/L (ref 136–145)
TSH SERPL DL<=0.05 MIU/L-ACNC: 2.66 UIU/ML (ref 0.36–3.74)
WBC # BLD AUTO: 6.14 THOUSAND/UL (ref 4.31–10.16)

## 2021-06-09 PROCEDURE — 86300 IMMUNOASSAY TUMOR CA 15-3: CPT | Performed by: INTERNAL MEDICINE

## 2021-06-09 PROCEDURE — 82306 VITAMIN D 25 HYDROXY: CPT

## 2021-06-09 PROCEDURE — 84443 ASSAY THYROID STIM HORMONE: CPT

## 2021-06-09 PROCEDURE — A9585 GADOBUTROL INJECTION: HCPCS | Performed by: UROLOGY

## 2021-06-09 PROCEDURE — 74183 MRI ABD W/O CNTR FLWD CNTR: CPT

## 2021-06-09 PROCEDURE — 80053 COMPREHEN METABOLIC PANEL: CPT

## 2021-06-09 PROCEDURE — 36415 COLL VENOUS BLD VENIPUNCTURE: CPT

## 2021-06-09 PROCEDURE — 85025 COMPLETE CBC W/AUTO DIFF WBC: CPT

## 2021-06-09 RX ADMIN — GADOBUTROL 6 ML: 604.72 INJECTION INTRAVENOUS at 11:56

## 2021-07-02 ENCOUNTER — OFFICE VISIT (OUTPATIENT)
Dept: HEMATOLOGY ONCOLOGY | Facility: CLINIC | Age: 66
End: 2021-07-02
Payer: MEDICARE

## 2021-07-02 VITALS
WEIGHT: 142.5 LBS | TEMPERATURE: 99 F | BODY MASS INDEX: 23.74 KG/M2 | HEART RATE: 78 BPM | DIASTOLIC BLOOD PRESSURE: 72 MMHG | SYSTOLIC BLOOD PRESSURE: 126 MMHG | RESPIRATION RATE: 18 BRPM | HEIGHT: 65 IN

## 2021-07-02 DIAGNOSIS — R91.8 LUNG NODULES: ICD-10-CM

## 2021-07-02 DIAGNOSIS — Z17.1 MALIGNANT NEOPLASM OF RIGHT BREAST IN FEMALE, ESTROGEN RECEPTOR NEGATIVE, UNSPECIFIED SITE OF BREAST (HCC): Primary | ICD-10-CM

## 2021-07-02 DIAGNOSIS — Q61.02 MULTIPLE RENAL CYSTS: ICD-10-CM

## 2021-07-02 DIAGNOSIS — R97.8 ABNORMAL TUMOR MARKERS: ICD-10-CM

## 2021-07-02 DIAGNOSIS — E04.1 THYROID NODULE: ICD-10-CM

## 2021-07-02 DIAGNOSIS — C50.911 MALIGNANT NEOPLASM OF RIGHT BREAST IN FEMALE, ESTROGEN RECEPTOR NEGATIVE, UNSPECIFIED SITE OF BREAST (HCC): Primary | ICD-10-CM

## 2021-07-02 PROCEDURE — 99214 OFFICE O/P EST MOD 30 MIN: CPT | Performed by: INTERNAL MEDICINE

## 2021-07-02 RX ORDER — LOSARTAN POTASSIUM 100 MG/1
100 TABLET ORAL DAILY
COMMUNITY
Start: 2021-06-06

## 2021-07-02 RX ORDER — METOPROLOL SUCCINATE 25 MG/1
TABLET, EXTENDED RELEASE ORAL
COMMUNITY
Start: 2021-06-05

## 2021-07-02 NOTE — PATIENT INSTRUCTIONS
Ultrasound thyroid in August 2021  Blood work and ultrasound of kidneys prior to next visit in 6 months

## 2021-07-02 NOTE — PROGRESS NOTES
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  and she had right mastectomy and left breast reduction surgery  She did not require adjuvant therapy  She is being followed  Patient has been running slightly high tumor marker CA 27-29 and highest was 60  This time tumor marker is 50 4 and that is being monitored   There has not been documented breast cancer recurrence  Patient also has stable thyroid nodules, lung nodules and renal cysts and they are being monitored      History of vitamin-D deficiency and she has been taking vitamin-D   Has some tiredness, minor arthralgia and some anxiety     No new symptoms since last visit     Physical examination and test results are as recorded and discussed   Breast cancer is in remission   Stable lung nodules and thyroid nodule and renal cysts   Monitoring tumor marker   Goal is cure from breast cancer    Plan is monitoring of above abnormalities Condition and plan discussed in detail  Questions answered   Discussed the importance of self-breast examination, eating healthy foods, exercises as tolerated and health screening tests      Patient is capable of self-care  See orders below                 Current Outpatient Medications:     aspirin 81 MG tablet, Take 81 mg by mouth daily  , Disp: , Rfl:     Cholecalciferol 1000 units capsule, Take 1,000 Units by mouth daily  , Disp: , Rfl:     Coenzyme Q10 10 MG capsule, Once daily, Disp: , Rfl:     Glucosamine 750 MG TABS, Take 300 mg by mouth daily  , Disp: , Rfl:     hydrochlorothiazide (HYDRODIURIL) 12 5 mg tablet, , Disp: , Rfl:     losartan (COZAAR) 100 MG tablet, Take 100 mg by mouth daily , Disp: , Rfl:     metoprolol succinate (TOPROL-XL) 25 mg 24 hr tablet, , Disp: , Rfl:     Multiple Vitamins-Minerals (MULTI COMPLETE) CAPS, Take by mouth daily  , Disp: , Rfl:     Omega-3 Fatty Acids ([employer] FISH OIL) 1000 MG CAPS, Take by mouth daily  , Disp: , Rfl:     Probiotic Product (PROBIOTIC-10 PO), Take by mouth daily  , Disp: , Rfl:     TURMERIC CURCUMIN PO, by Does not apply route daily  , Disp: , Rfl:     No Known Allergies    Oncology History    No history exists  ROS:  07/02/21 Reviewed 12 systems: See symptoms in HPI:   Presently no neurological, cardiac, pulmonary, GI and  symptoms  Other symptoms are in HPI  No  fever, chills, bleeding, bone pains, skin rash, weight loss, weakness, numbness,  claudication and gait problem  No frequent infections  Not unusually sensitive to heat or cold  No swelling of the ankles  No swollen glands  Patient is anxious  /72 (BP Location: Left arm, Patient Position: Sitting, Cuff Size: Adult)   Pulse 78   Temp 99 °F (37 2 °C) (Temporal)   Resp 18   Ht 5' 4 5" (1 638 m)   Wt 64 6 kg (142 lb 8 oz)   BMI 24 08 kg/m²     Physical Exam:  Alert, oriented, not in distress, stable vitals, no icterus, no oral thrush, no palpable neck mass, clear lung fields, regular heart rate, abdomen  soft and non tender, no palpable abdominal mass, no ascites, no edema of ankles, no calf tenderness, no focal neurological deficit, no skin rash, no palpable lymphadenopathy in the neck and axillary areas,  no clubbing  Patient is anxious  Performance status 0  IMAGING:  IMPRESSION:        1  Stable lobulated mildly complex exophytic cyst arising from the lower pole of the right kidney with a few thin enhancing septations and some internal debris as described above  No solid nodularity or thickened septations  Given the heterogeneity on   T1-weighted sequences of portion of the cyst, this is most consistent with a Bosniak 2F  Continued surveillance recommended with MRI  Next follow-up imaging recommended in 12 months            2    Bilateral simple and right proteinaceous versus hemorrhagic cysts            The study was marked in EPIC for significant notification         Workstation performed: [id number]    Imaging     MRI abdomen w wo contrast (Order: [id number]) - 3/23/2020   IMPRESSION:     Multiple bilateral renal cysts  With respect to the right lower pole cyst(s), there is no suspicious interval change  Periodic reassessment with ultrasound is recommended (in approximately 1 year)   The examination demonstrates a finding requiring imaging follow-up and was logged as such in [address] performed: [id number]      Imaging     US kidney and bladder (Order: [id number]) - 6/1/2020   IMPRESSION:     Unchanged bilobed 4 3 x 2 5 x 2 5 cm right renal lower pole mildly complex cyst (series 2 image 10)  This is stable dating back to MR from 12/15/2018, therefore is very likely benign  Based on Bosniak Classification of Cystic Renal Masses, Version   2019, this lesion is a Bosniak IIF cystic renal mass  The large majority of Bosniak IIF masses are benign  When malignant, nearly all are indolent  Generally, Bosniak IIF masses are followed by imaging at 6 months and 12 months, then annually for a total   of 5 years to assess for morphologic change  Recommend next follow-up in 1 year            Workstation performed: [id number]      Imaging    MRI abdomen w wo contrast (Order: [id number]) - 6/9/2021    IMPRESSION:     Stable simple left ovarian cyst                     Workstation performed: [id number]      Imaging    US pelvis complete w transvaginal (Order: [id number]) - 10/19/2020  IMPRESSION:     Stable subcentimeter pulmonary nodules dating back to 2014   According to the Fleischner Society 2017, patients with known malignancy are at increased risk of metastasis and should receive followup CT at intervals appropriate for the type of cancer and   its risk of pulmonary metastases       Redemonstration of diffusely enlarged right thyroid lobe which was previously evaluated with ultrasound      Workstation performed: [id number]      Imaging    IMPRESSION:   IMPRESSION:   No MR features to suggest malignancy in the native left or the reconstructed right breast  Intact right sided implant  Recommend routine follow-up left mammogram and breast MRI as clinically indicated       BI-RADS Category 2:   Benign     Date: 06/21/21   Received From: [facility]           CT chest wo contrast (Order: [id number]) - 9/14/2020    LABS:    Results for orders placed or performed in visit on 06/09/21   CBC and differential   Result Value Ref Range    WBC 6 14 4 31 - 10 16 Thousand/uL    RBC 4 54 3 81 - 5 12 Million/uL    Hemoglobin 12 7 11 5 - 15 4 g/dL    Hematocrit 40 2 34 8 - 46 1 %    MCV 89 82 - 98 fL    MCH 28 0 26 8 - 34 3 pg    MCHC 31 6 31 4 - 37 4 g/dL    RDW 13 4 11 6 - 15 1 %    MPV 9 9 8 9 - 12 7 fL    Platelets 035 121 - 151 Thousands/uL    nRBC 0 /100 WBCs    Neutrophils Relative 58 43 - 75 %    Immat GRANS % 0 0 - 2 %    Lymphocytes Relative 27 14 - 44 %    Monocytes Relative 8 4 - 12 %    Eosinophils Relative 6 0 - 6 %    Basophils Relative 1 0 - 1 %    Neutrophils Absolute 3 59 1 85 - 7 62 Thousands/µL    Immature Grans Absolute 0 02 0 00 - 0 20 Thousand/uL    Lymphocytes Absolute 1 63 0 60 - 4 47 Thousands/µL    Monocytes Absolute 0 46 0 17 - 1 22 Thousand/µL    Eosinophils Absolute 0 39 0 00 - 0 61 Thousand/µL    Basophils Absolute 0 05 0 00 - 0 10 Thousands/µL   Comprehensive metabolic panel   Result Value Ref Range    Sodium 140 136 - 145 mmol/L    Potassium 4 0 3 5 - 5 3 mmol/L    Chloride 104 100 - 108 mmol/L    CO2 29 21 - 32 mmol/L    ANION GAP 7 4 - 13 mmol/L    BUN 24 5 - 25 mg/dL    Creatinine 0 62 0 60 - 1 30 mg/dL    Glucose, Fasting 94 65 - 99 mg/dL    Calcium 9 0 8 3 - 10 1 mg/dL    AST 17 5 - 45 U/L    ALT 26 12 - 78 U/L    Alkaline Phosphatase 82 46 - 116 U/L    Total Protein 7 0 6 4 - 8 2 g/dL    Albumin 3 6 3 5 - 5 0 g/dL    Total Bilirubin 0 29 0 20 - 1 00 mg/dL    eGFR 95 ml/min/1 73sq m   Vitamin D 25 hydroxy   Result Value Ref Range    Vit D, 25-Hydroxy 33 9 30 0 - 100 0 ng/mL TSH, 3rd generation with Free T4 reflex   Result Value Ref Range    TSH 3RD GENERATON 2 661 0 358 - 3 740 uIU/mL     Labs, Imaging, & Other studies:   All pertinent labs and imaging studies were personally reviewed      Reviewed CBC D, CMP, CA 27-29, vitamin-D, TSH, MRI of breasts and abdomen and discussed with patient  Assessment and plan: In 2006 patient was diagnosed to have 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  and she had right mastectomy and left breast reduction surgery  She did not require adjuvant therapy  She is being followed  Patient has been running slightly high tumor marker CA 27-29 and highest was 60  This time tumor marker is 50 4 and that is being monitored   There has not been documented breast cancer recurrence  Patient also has stable thyroid nodules, lung nodules and renal cysts and they are being monitored      History of vitamin-D deficiency and she has been taking vitamin-D   Has some tiredness, minor arthralgia and some anxiety     No new symptoms since last visit    Physical examination and test results are as recorded and discussed   Breast cancer is in remission   Stable lung nodules and thyroid nodule and renal cysts and these abnormalities are being monitored   Monitoring tumor marker   Goal is cure from breast cancer    Condition and plan discussed in detail  Questions answered   Discussed the importance of self-breast examination, eating healthy foods, exercises as tolerated and health screening tests     Patient follows with her primary physician and other consultants including urologist and breast surgeon  Patient is capable of self-care  Discussed precautions against coronavirus    See  diagnoses,orders and instructions below  1  Malignant neoplasm of right breast in female, estrogen receptor negative, unspecified site of breast ([facility] [address] )    - CBC and differential; Future  - Comprehensive metabolic panel;  Future  - Cancer antigen 27 29; Future    2  Thyroid nodule    - US thyroid; Future    3  Multiple renal cysts    - US kidney and bladder; Future    4  Abnormal tumor markers      5  Lung nodules          Ultrasound thyroid in August 2021  Blood work and ultrasound of kidneys prior to next visit in 6 months  Patient voiced understanding and agrees      Counseling / Coordination of Care       Provided counseling and support

## 2021-07-14 ENCOUNTER — OFFICE VISIT (OUTPATIENT)
Dept: UROLOGY | Facility: CLINIC | Age: 66
End: 2021-07-14
Payer: MEDICARE

## 2021-07-14 VITALS
HEART RATE: 70 BPM | BODY MASS INDEX: 22.82 KG/M2 | WEIGHT: 137 LBS | SYSTOLIC BLOOD PRESSURE: 132 MMHG | HEIGHT: 65 IN | DIASTOLIC BLOOD PRESSURE: 70 MMHG

## 2021-07-14 DIAGNOSIS — N28.1 KIDNEY CYST, ACQUIRED: Primary | ICD-10-CM

## 2021-07-14 PROCEDURE — 99213 OFFICE O/P EST LOW 20 MIN: CPT | Performed by: PHYSICIAN ASSISTANT

## 2021-07-14 RX ORDER — ECHINACEA 400 MG
CAPSULE ORAL
COMMUNITY

## 2021-07-14 NOTE — PROGRESS NOTES
UROLOGY PROGRESS NOTE   Patient Identifiers: Alfred Jones (MRN [id number])  Date of Service: 7/14/2021    Subjective:     45-year-old female history of complex renal cyst   She had an MRI showing a 4 1 cm Bosniak IIF renal cyst in the right lower pole  Her follow-up MRI shows this to be unchanged over 2 year time frame  She sees Medical Oncology for history of breast cancer  She had a urine culture in May which was mixed contaminants  Reason for visit:  Complex renal cyst follow-up    Objective:     VITALS:    There were no vitals filed for this visit  LABS:  Lab Results   Component Value Date    HGB 12 7 06/09/2021    HCT 40 2 06/09/2021    WBC 6 14 06/09/2021     06/09/2021   ]    Lab Results   Component Value Date     11/15/2017    K 4 0 06/09/2021     06/09/2021    CO2 29 06/09/2021    BUN 24 06/09/2021    CREATININE 0 62 06/09/2021    CALCIUM 9 0 06/09/2021   ]        INPATIENT MEDS:    Current Outpatient Medications:     aspirin 81 MG tablet, Take 81 mg by mouth daily  , Disp: , Rfl:     Cholecalciferol 1000 units capsule, Take 1,000 Units by mouth daily  , Disp: , Rfl:     Coenzyme Q10 10 MG capsule, Once daily, Disp: , Rfl:     Glucosamine 750 MG TABS, Take 300 mg by mouth daily  , Disp: , Rfl:     hydrochlorothiazide (HYDRODIURIL) 12 5 mg tablet, , Disp: , Rfl:     losartan (COZAAR) 100 MG tablet, Take 100 mg by mouth daily , Disp: , Rfl:     metoprolol succinate (TOPROL-XL) 25 mg 24 hr tablet, , Disp: , Rfl:     Multiple Vitamins-Minerals (MULTI COMPLETE) CAPS, Take by mouth daily  , Disp: , Rfl:     Omega-3 Fatty Acids ([employer]S FISH OIL) 1000 MG CAPS, Take by mouth daily  , Disp: , Rfl:     Probiotic Product (PROBIOTIC-10 PO), Take by mouth daily  , Disp: , Rfl:     TURMERIC CURCUMIN PO, by Does not apply route daily  , Disp: , Rfl:       Physical Exam:   There were no vitals taken for this visit    GEN: no acute distress    RESP: breathing comfortably with no accessory muscle use    ABD: soft, non-tender, non-distended   INCISION:    EXT: no significant peripheral edema     RADIOLOGY:   MRI - ABDOMEN - WITH AND WITHOUT CONTRAST   IMPRESSION:     Unchanged bilobed 4 3 x 2 5 x 2 5 cm right renal lower pole mildly complex cyst (series 2 image 10)  This is stable dating back to MR from 12/15/2018, therefore is very likely benign  Based on Bosniak Classification of Cystic Renal Masses, Version   2019, this lesion is a Bosniak IIF cystic renal mass  The large majority of Bosniak IIF masses are benign  When malignant, nearly all are indolent  Generally, Bosniak IIF masses are followed by imaging at 6 months and 12 months, then annually for a total   of 5 years to assess for morphologic change  Recommend next follow-up in 1 year  Assessment:    1   Renal cyst-  4 3 cm Bosniak 2 F with internal septation and asymmetry    Plan:   - will continue surveillance and follow-up in 1 year with an MRI prior to visit  -  -  -

## 2021-08-12 ENCOUNTER — HOSPITAL ENCOUNTER (OUTPATIENT)
Dept: ULTRASOUND IMAGING | Facility: HOSPITAL | Age: 66
Discharge: HOME | End: 2021-08-12
Attending: OBSTETRICS & GYNECOLOGY
Payer: MEDICARE

## 2021-08-12 DIAGNOSIS — N83.202 LEFT OVARIAN CYST: ICD-10-CM

## 2021-08-12 DIAGNOSIS — E04.1 THYROID NODULE: ICD-10-CM

## 2021-08-12 PROCEDURE — 76830 TRANSVAGINAL US NON-OB: CPT

## 2021-08-12 PROCEDURE — 76536 US EXAM OF HEAD AND NECK: CPT

## 2021-08-12 PROCEDURE — 76856 US EXAM PELVIC COMPLETE: CPT

## 2021-08-16 ENCOUNTER — TELEPHONE (OUTPATIENT)
Dept: PODIATRY | Facility: CLINIC | Age: 66
End: 2021-08-16

## 2021-08-20 ENCOUNTER — ANNUAL EXAM (OUTPATIENT)
Dept: OBGYN CLINIC | Facility: CLINIC | Age: 66
End: 2021-08-20
Payer: MEDICARE

## 2021-08-20 VITALS
SYSTOLIC BLOOD PRESSURE: 118 MMHG | BODY MASS INDEX: 23.66 KG/M2 | HEIGHT: 65 IN | WEIGHT: 142 LBS | DIASTOLIC BLOOD PRESSURE: 68 MMHG

## 2021-08-20 DIAGNOSIS — Z12.31 ENCOUNTER FOR SCREENING MAMMOGRAM FOR MALIGNANT NEOPLASM OF BREAST: ICD-10-CM

## 2021-08-20 DIAGNOSIS — Z01.419 WOMEN'S ANNUAL ROUTINE GYNECOLOGICAL EXAMINATION: Primary | ICD-10-CM

## 2021-08-20 PROBLEM — Z86.000 HISTORY OF DUCTAL CARCINOMA IN SITU (DCIS) OF BREAST: Status: ACTIVE | Noted: 2021-03-01

## 2021-08-20 PROCEDURE — G0101 CA SCREEN;PELVIC/BREAST EXAM: HCPCS | Performed by: OBSTETRICS & GYNECOLOGY

## 2021-08-20 NOTE — PROGRESS NOTES
ASSESSMENT & PLAN:   Diagnoses and all orders for this visit:    Women's annual routine gynecological examination      The following were reviewed in today's visit: Current ASCCP Guidelines, breast self exam, exercise and healthy diet  Patient to return to office yearly for annual exam    All questions have been answered to her satisfaction  CC:  Annual Gynecologic Examination    HPI: Liseth Ribeiro is a 73 y o  [id number] who presents for annual gynecologic examination  She has the following concerns:  None  Daughter recently diagnosed with bone cancer, lives in [place]  Health Maintenance:    She exercises - she walks  She wears her seatbelt routinely  She does perform regular monthly self breast exams  She feels safe at home  Patients does follow a balanced diet  Last mammogram: 2021 MRI of breasts, ordered by breast surgeon  Last colonoscopy: ; due in January       Past Medical History:   Diagnosis Date    Arthritis     Bunion of great toe of right foot     Dental crown present     Environmental allergies     ragweed    Exercises 5 to 6 times per week     treadmill    GERD (gastroesophageal reflux disease)     History of kidney stones     History of Lyme disease     History of palpitations     Kidney cysts     "bilat to be followed in 6 months with CT Scan"    Kidney stone     Left arm pain     "occas related to lifting weights recently"    Limb alert care status     RIGHT ;no BP/IV's    Lung nodule     Malignant neoplasm of upper-outer quadrant of right female breast (HCC)     right    Motion sickness     Thyroid disorder     nodules/enlarged    Tooth missing     Wears glasses        Past Surgical History:   Procedure Laterality Date    ABDOMINOPLASTY       SECTION      COLONOSCOPY      ESOPHAGOGASTRODUODENOSCOPY      HYSTERECTOMY      KNEE ARTHROSCOPY Left     MASTECTOMY Right     NJ HALLUX RIGIDUS W/CHEILECTOMY 1ST MP JT W/IMPLT Right 2018    Procedure: CHEILECTOMY HALLUX RIGIDUS; GREAT TOE CARTIVA IMPLANT;  Surgeon: Tamanna Good DPM;  Location: [facility];  Service: Podiatry       Past OB/Gyn History:  OB History        3    Para   3    Term   3            AB        Living   3       SAB        TAB        Ectopic        Multiple        Live Births   3               Menstrual history: Patient is post menopausal    History of abnormal pap smears:  No    Patient is currently sexually active  Family History   Problem Relation Age of Onset    No Known Problems Mother     Prostate cancer Father     Hypertension Father     Stroke Father     No Known Problems Sister     No Known Problems Daughter     No Known Problems Sister     No Known Problems Sister     No Known Problems Daughter     No Known Problems Daughter        Social History:  Social History     Socioeconomic History    Marital status: /Civil Union     Spouse name: Not on file    Number of children: Not on file    Years of education: Not on file    Highest education level: Not on file   Occupational History    Not on file   Tobacco Use    Smoking status: Never Smoker    Smokeless tobacco: Never Used   Vaping Use    Vaping Use: Never used   Substance and Sexual Activity    Alcohol use: No    Drug use: No    Sexual activity: Yes     Partners: Male     Birth control/protection: Post-menopausal   Other Topics Concern    Not on file   Social History Narrative    Not on file     Social Determinants of Health     Financial Resource Strain:     Difficulty of Paying Living Expenses:    Food Insecurity:     Worried About Running Out of Food in the Last Year:     [address] in the Last Year:    Transportation Needs:     Lack of Transportation (Medical):      Lack of Transportation (Non-Medical):    Physical Activity:     Days of Exercise per Week:     Minutes of Exercise per Session:    Stress:     Feeling of Stress :    Social Connections:     Frequency of Communication with Friends and Family:     Frequency of Social Gatherings with Friends and Family:     Attends [employer] Services:     Active Member of Clubs or Organizations:     Attends Club or Organization Meetings:     Marital Status:    Intimate Partner Violence:     Fear of Current or Ex-Partner:     Emotionally Abused:     Physically Abused:     Sexually Abused:      Presently lives with , daughter and grandson  Patient is   No Known Allergies      Current Outpatient Medications:     aspirin 81 MG tablet, Take 81 mg by mouth daily  , Disp: , Rfl:     Cholecalciferol 1000 units capsule, Take 1,000 Units by mouth daily  , Disp: , Rfl:     Coenzyme Q10 10 MG capsule, Once daily, Disp: , Rfl:     Flaxseed, Linseed, (Flaxseed Oil) 1000 MG CAPS, Take as directed, Disp: , Rfl:     Glucosamine 750 MG TABS, Take 300 mg by mouth daily  , Disp: , Rfl:     hydrochlorothiazide (HYDRODIURIL) 12 5 mg tablet, , Disp: , Rfl:     losartan (COZAAR) 100 MG tablet, Take 100 mg by mouth daily , Disp: , Rfl:     metoprolol succinate (TOPROL-XL) 25 mg 24 hr tablet, , Disp: , Rfl:     Multiple Vitamins-Minerals (MULTI COMPLETE) CAPS, Take by mouth daily  , Disp: , Rfl:     Omega-3 Fatty Acids ([employer]S FISH OIL) 1000 MG CAPS, Take by mouth daily  , Disp: , Rfl:     Probiotic Product (PROBIOTIC-10 PO), Take by mouth daily  , Disp: , Rfl:     TURMERIC CURCUMIN PO, by Does not apply route daily  , Disp: , Rfl:     Review of Systems:  Review of Systems   Constitutional: Negative for chills and fever  Respiratory: Negative for shortness of breath  Cardiovascular: Negative for chest pain and palpitations  Gastrointestinal: Negative for abdominal distention, abdominal pain, blood in stool, constipation, nausea and vomiting  Genitourinary: Negative for difficulty urinating, dysuria, frequency, pelvic pain, urgency, vaginal bleeding and vaginal discharge  Neurological: Negative for headaches           Physical Exam:  /68 (BP Location: Right arm, Patient Position: Sitting, Cuff Size: Large)   Ht 5' 4 5" (1 638 m)   Wt 64 4 kg (142 lb)   BMI 24 00 kg/m²    OBGyn Exam

## 2021-09-28 ENCOUNTER — HOSPITAL ENCOUNTER (OUTPATIENT)
Dept: RADIOLOGY | Facility: HOSPITAL | Age: 66
Discharge: HOME | End: 2021-09-28
Attending: INTERNAL MEDICINE
Payer: MEDICARE

## 2021-09-28 DIAGNOSIS — D48.5 NEOPLASM OF UNCERTAIN BEHAVIOR OF SKIN: ICD-10-CM

## 2021-09-28 PROCEDURE — 73080 X-RAY EXAM OF ELBOW: CPT

## 2021-12-15 ENCOUNTER — APPOINTMENT (OUTPATIENT)
Dept: LAB | Facility: AMBULARY SURGERY CENTER | Age: 66
End: 2021-12-15
Payer: MEDICARE

## 2021-12-15 DIAGNOSIS — E78.49 FAMILIAL COMBINED HYPERLIPIDEMIA: ICD-10-CM

## 2021-12-15 DIAGNOSIS — N28.1 ACQUIRED CYST OF KIDNEY: ICD-10-CM

## 2021-12-15 DIAGNOSIS — N28.1 KIDNEY CYST, ACQUIRED: ICD-10-CM

## 2021-12-15 DIAGNOSIS — C50.911 MALIGNANT NEOPLASM OF RIGHT BREAST IN FEMALE, ESTROGEN RECEPTOR NEGATIVE, UNSPECIFIED SITE OF BREAST (HCC): ICD-10-CM

## 2021-12-15 DIAGNOSIS — D49.3 BREAST TUMOR: ICD-10-CM

## 2021-12-15 DIAGNOSIS — E04.2 NONTOXIC MULTINODULAR GOITER: ICD-10-CM

## 2021-12-15 DIAGNOSIS — D48.5 NEOPLASM OF UNCERTAIN BEHAVIOR OF SKIN: Primary | ICD-10-CM

## 2021-12-15 DIAGNOSIS — Z17.1 MALIGNANT NEOPLASM OF RIGHT BREAST IN FEMALE, ESTROGEN RECEPTOR NEGATIVE, UNSPECIFIED SITE OF BREAST (HCC): ICD-10-CM

## 2021-12-15 LAB
ALBUMIN SERPL BCP-MCNC: 3.6 G/DL (ref 3.5–5)
ALP SERPL-CCNC: 82 U/L (ref 46–116)
ALT SERPL W P-5'-P-CCNC: 23 U/L (ref 12–78)
ANION GAP SERPL CALCULATED.3IONS-SCNC: 6 MMOL/L (ref 4–13)
AST SERPL W P-5'-P-CCNC: 15 U/L (ref 5–45)
BASOPHILS # BLD AUTO: 0.04 THOUSANDS/ΜL (ref 0–0.1)
BASOPHILS NFR BLD AUTO: 1 % (ref 0–1)
BILIRUB SERPL-MCNC: 0.55 MG/DL (ref 0.2–1)
BUN SERPL-MCNC: 19 MG/DL (ref 5–25)
CALCIUM SERPL-MCNC: 9.1 MG/DL (ref 8.3–10.1)
CHLORIDE SERPL-SCNC: 105 MMOL/L (ref 100–108)
CHOLEST SERPL-MCNC: 164 MG/DL
CO2 SERPL-SCNC: 28 MMOL/L (ref 21–32)
CREAT SERPL-MCNC: 0.68 MG/DL (ref 0.6–1.3)
EOSINOPHIL # BLD AUTO: 0.36 THOUSAND/ΜL (ref 0–0.61)
EOSINOPHIL NFR BLD AUTO: 6 % (ref 0–6)
ERYTHROCYTE [DISTWIDTH] IN BLOOD BY AUTOMATED COUNT: 13.4 % (ref 11.6–15.1)
GFR SERPL CREATININE-BSD FRML MDRD: 92 ML/MIN/1.73SQ M
GLUCOSE P FAST SERPL-MCNC: 82 MG/DL (ref 65–99)
HCT VFR BLD AUTO: 41.2 % (ref 34.8–46.1)
HDLC SERPL-MCNC: 45 MG/DL
HGB BLD-MCNC: 13 G/DL (ref 11.5–15.4)
IMM GRANULOCYTES # BLD AUTO: 0.01 THOUSAND/UL (ref 0–0.2)
IMM GRANULOCYTES NFR BLD AUTO: 0 % (ref 0–2)
LDLC SERPL CALC-MCNC: 93 MG/DL (ref 0–100)
LYMPHOCYTES # BLD AUTO: 1.78 THOUSANDS/ΜL (ref 0.6–4.47)
LYMPHOCYTES NFR BLD AUTO: 28 % (ref 14–44)
MCH RBC QN AUTO: 27.9 PG (ref 26.8–34.3)
MCHC RBC AUTO-ENTMCNC: 31.6 G/DL (ref 31.4–37.4)
MCV RBC AUTO: 88 FL (ref 82–98)
MONOCYTES # BLD AUTO: 0.51 THOUSAND/ΜL (ref 0.17–1.22)
MONOCYTES NFR BLD AUTO: 8 % (ref 4–12)
NEUTROPHILS # BLD AUTO: 3.62 THOUSANDS/ΜL (ref 1.85–7.62)
NEUTS SEG NFR BLD AUTO: 57 % (ref 43–75)
NONHDLC SERPL-MCNC: 119 MG/DL
NRBC BLD AUTO-RTO: 0 /100 WBCS
PLATELET # BLD AUTO: 266 THOUSANDS/UL (ref 149–390)
PMV BLD AUTO: 10.6 FL (ref 8.9–12.7)
POTASSIUM SERPL-SCNC: 3.7 MMOL/L (ref 3.5–5.3)
PROT SERPL-MCNC: 7 G/DL (ref 6.4–8.2)
RBC # BLD AUTO: 4.66 MILLION/UL (ref 3.81–5.12)
SODIUM SERPL-SCNC: 139 MMOL/L (ref 136–145)
TRIGL SERPL-MCNC: 129 MG/DL
TSH SERPL DL<=0.05 MIU/L-ACNC: 2.7 UIU/ML (ref 0.36–3.74)
WBC # BLD AUTO: 6.32 THOUSAND/UL (ref 4.31–10.16)

## 2021-12-15 PROCEDURE — 80053 COMPREHEN METABOLIC PANEL: CPT

## 2021-12-15 PROCEDURE — 85025 COMPLETE CBC W/AUTO DIFF WBC: CPT

## 2021-12-15 PROCEDURE — 84443 ASSAY THYROID STIM HORMONE: CPT

## 2021-12-15 PROCEDURE — 80061 LIPID PANEL: CPT

## 2021-12-16 ENCOUNTER — TELEPHONE (OUTPATIENT)
Dept: HEMATOLOGY ONCOLOGY | Facility: CLINIC | Age: 66
End: 2021-12-16

## 2022-02-23 ENCOUNTER — OFFICE VISIT (OUTPATIENT)
Dept: HEMATOLOGY ONCOLOGY | Facility: CLINIC | Age: 67
End: 2022-02-23
Payer: MEDICARE

## 2022-02-23 VITALS
SYSTOLIC BLOOD PRESSURE: 124 MMHG | RESPIRATION RATE: 16 BRPM | TEMPERATURE: 97.6 F | WEIGHT: 144 LBS | OXYGEN SATURATION: 98 % | HEART RATE: 79 BPM | BODY MASS INDEX: 23.99 KG/M2 | HEIGHT: 65 IN | DIASTOLIC BLOOD PRESSURE: 70 MMHG

## 2022-02-23 DIAGNOSIS — Q61.02 MULTIPLE RENAL CYSTS: ICD-10-CM

## 2022-02-23 DIAGNOSIS — E04.1 THYROID NODULE: ICD-10-CM

## 2022-02-23 DIAGNOSIS — Z17.1 MALIGNANT NEOPLASM OF RIGHT BREAST IN FEMALE, ESTROGEN RECEPTOR NEGATIVE, UNSPECIFIED SITE OF BREAST (HCC): Primary | ICD-10-CM

## 2022-02-23 DIAGNOSIS — R91.8 LUNG NODULES: ICD-10-CM

## 2022-02-23 DIAGNOSIS — R97.8 ABNORMAL TUMOR MARKERS: ICD-10-CM

## 2022-02-23 DIAGNOSIS — C50.911 MALIGNANT NEOPLASM OF RIGHT BREAST IN FEMALE, ESTROGEN RECEPTOR NEGATIVE, UNSPECIFIED SITE OF BREAST (HCC): Primary | ICD-10-CM

## 2022-02-23 PROCEDURE — 99214 OFFICE O/P EST MOD 30 MIN: CPT | Performed by: INTERNAL MEDICINE

## 2022-02-23 NOTE — PROGRESS NOTES
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  In 2006 patient  had right mastectomy and left breast reduction surgery  She did not require adjuvant therapy  She is being followed  Patient has been running slightly high tumor marker CA 27-29 and highest was 60  This time tumor marker is 44 9 and that is being monitored   There has not been documented breast cancer recurrence  Patient also has stable thyroid nodules, lung nodules and renal cysts and they are being monitored  Lung nodules are between 2 mm and 4 mm and are stable for the last 5 years or more     History of vitamin-D deficiency and she has been taking vitamin-D   Vitamin-D level came back to within normal range  She will continue taking vitamin-D  Has some tiredness, minor arthritic symptoms in her knees and some anxiety                       Current Outpatient Medications:     aspirin 81 MG tablet, Take 81 mg by mouth daily  , Disp: , Rfl:     Cholecalciferol 1000 units capsule, Take 1,000 Units by mouth daily  , Disp: , Rfl:     Coenzyme Q10 10 MG capsule, Once daily, Disp: , Rfl:     Flaxseed, Linseed, (Flaxseed Oil) 1000 MG CAPS, Take as directed, Disp: , Rfl:     Glucosamine 750 MG TABS, Take 300 mg by mouth daily  , Disp: , Rfl:     hydrochlorothiazide (HYDRODIURIL) 12 5 mg tablet, , Disp: , Rfl:     losartan (COZAAR) 100 MG tablet, Take 100 mg by mouth daily , Disp: , Rfl:     metoprolol succinate (TOPROL-XL) 25 mg 24 hr tablet, , Disp: , Rfl:     Multiple Vitamins-Minerals (MULTI COMPLETE) CAPS, Take by mouth daily  , Disp: , Rfl:     Omega-3 Fatty Acids ([employer] FISH OIL) 1000 MG CAPS, Take by mouth daily  , Disp: , Rfl:     Probiotic Product (PROBIOTIC-10 PO), Take by mouth daily  , Disp: , Rfl:     TURMERIC CURCUMIN PO, by Does not apply route daily  , Disp: , Rfl:     No Known Allergies    Oncology History    No history exists         ROS:  02/23/22 Reviewed 12 systems: See symptoms in HPI:   Presently no neurological, cardiac, pulmonary, GI and  symptoms  Other symptoms are in HPI  No symptoms of  fever, chills, bleeding, bone pains, skin rash, weight loss, weakness, numbness,  claudication and gait problem  No frequent infections  Not unusually sensitive to heat or cold  No swelling of the ankles  No swollen glands  Patient is anxious  /70 (BP Location: Left arm, Patient Position: Sitting, Cuff Size: Adult)   Pulse 79   Temp 97 6 °F (36 4 °C) (Temporal)   Resp 16   Ht 5' 4 5" (1 638 m)   Wt 65 3 kg (144 lb)   SpO2 98%   BMI 24 34 kg/m²     Physical Exam:  My nurse Azucena Birmingham was in the room with me during examination    Patient is alert and oriented   Patient is not in distress   Vital signs are above  There is no icterus   No oral thrush , no palpable neck mass, clear lung fields, regular heart rate, abdomen  soft and non tender, no palpable abdominal mass, no ascites, no edema of ankles, no calf tenderness, no focal neurological deficit, no skin rash, no palpable lymphadenopathy in the neck and axillary areas,  no clubbing  Patient is anxious  Performance status 0  IMAGING:  IMPRESSION:        1  Stable lobulated mildly complex exophytic cyst arising from the lower pole of the right kidney with a few thin enhancing septations and some internal debris as described above  No solid nodularity or thickened septations  Given the heterogeneity on   T1-weighted sequences of portion of the cyst, this is most consistent with a Bosniak 2F  Continued surveillance recommended with MRI  Next follow-up imaging recommended in 12 months            2  Bilateral simple and right proteinaceous versus hemorrhagic cysts            The study was marked in EPIC for significant notification         Workstation performed: [id number]      Imaging     MRI abdomen w wo contrast (Order: [id number]) - 3/23/2020   IMPRESSION:     Multiple bilateral renal cysts    With respect to the right lower pole cyst(s), there is no suspicious interval change  Periodic reassessment with ultrasound is recommended (in approximately 1 year)   The examination demonstrates a finding requiring imaging follow-up and was logged as such in [address] [place] [address] Pkwy performed: [id number]      Imaging     US kidney and bladder (Order: [id number]) - 6/1/2020   IMPRESSION:     Unchanged bilobed 4 3 x 2 5 x 2 5 cm right renal lower pole mildly complex cyst (series 2 image 10)  This is stable dating back to MR from 12/15/2018, therefore is very likely benign  Based on Bosniak Classification of Cystic Renal Masses, Version   2019, this lesion is a Bosniak IIF cystic renal mass  The large majority of Bosniak IIF masses are benign  When malignant, nearly all are indolent  Generally, Bosniak IIF masses are followed by imaging at 6 months and 12 months, then annually for a total   of 5 years to assess for morphologic change  Recommend next follow-up in 1 year            Workstation performed: [id number]      Imaging    MRI abdomen w wo contrast (Order: [id number]) - 6/9/2021    IMPRESSION:     Stable simple left ovarian cyst                     Workstation performed: [id number]      Imaging    US pelvis complete w transvaginal (Order: [id number]) - 10/19/2020  IMPRESSION:     Stable subcentimeter pulmonary nodules dating back to 2014  According to the Fleischner Society 2017, patients with known malignancy are at increased risk of metastasis and should receive followup CT at intervals appropriate for the type of cancer and   its risk of pulmonary metastases       Redemonstration of diffusely enlarged right thyroid lobe which was previously evaluated with ultrasound      Workstation performed: [id number]      Imaging    IMPRESSION:   IMPRESSION:   No MR features to suggest malignancy in the native left or the reconstructed right breast  Intact right sided implant       Recommend routine follow-up left mammogram and breast MRI as clinically indicated  BI-RADS Category 2:   Benign     Date: 06/21/21   Received From: [facility]         IMPRESSION:  1   Stable appearance of multiple thyroid nodules as described above  This includes calcified nodule within the right lobe which does meet ACR size criteria for biopsy  Given the stability of this nodule from prior exams dating back to 2014, continued   annual follow up with ultrasound can be performed in lieu of biopsy  2   Enlarged and heterogeneous thyroid gland, similar to prior exams      Reference: ACR Thyroid Imaging, Reporting and Data System (TI-RADS): White Paper of the [employer]   J AM Geremias Radiol 2017;14:587-595  (additional recommendations based on American Thyroid Association 2015 guidelines )        Workstation performed: [id number]          Imaging    US thyroid (Order: [id number]) - 8/12/2021      CT chest wo contrast (Order: [id number]) - 9/14/2020    LABS:    Results for orders placed or performed in visit on 12/15/21   CBC and differential   Result Value Ref Range    WBC 6 32 4 31 - 10 16 Thousand/uL    RBC 4 66 3 81 - 5 12 Million/uL    Hemoglobin 13 0 11 5 - 15 4 g/dL    Hematocrit 41 2 34 8 - 46 1 %    MCV 88 82 - 98 fL    MCH 27 9 26 8 - 34 3 pg    MCHC 31 6 31 4 - 37 4 g/dL    RDW 13 4 11 6 - 15 1 %    MPV 10 6 8 9 - 12 7 fL    Platelets 347 192 - 679 Thousands/uL    nRBC 0 /100 WBCs    Neutrophils Relative 57 43 - 75 %    Immat GRANS % 0 0 - 2 %    Lymphocytes Relative 28 14 - 44 %    Monocytes Relative 8 4 - 12 %    Eosinophils Relative 6 0 - 6 %    Basophils Relative 1 0 - 1 %    Neutrophils Absolute 3 62 1 85 - 7 62 Thousands/µL    Immature Grans Absolute 0 01 0 00 - 0 20 Thousand/uL    Lymphocytes Absolute 1 78 0 60 - 4 47 Thousands/µL    Monocytes Absolute 0 51 0 17 - 1 22 Thousand/µL    Eosinophils Absolute 0 36 0 00 - 0 61 Thousand/µL    Basophils Absolute 0 04 0 00 - 0 10 Thousands/µL   Comprehensive metabolic panel   Result Value Ref Range    Sodium 139 136 - 145 mmol/L    Potassium 3 7 3 5 - 5 3 mmol/L    Chloride 105 100 - 108 mmol/L    CO2 28 21 - 32 mmol/L    ANION GAP 6 4 - 13 mmol/L    BUN 19 5 - 25 mg/dL    Creatinine 0 68 0 60 - 1 30 mg/dL    Glucose, Fasting 82 65 - 99 mg/dL    Calcium 9 1 8 3 - 10 1 mg/dL    AST 15 5 - 45 U/L    ALT 23 12 - 78 U/L    Alkaline Phosphatase 82 46 - 116 U/L    Total Protein 7 0 6 4 - 8 2 g/dL    Albumin 3 6 3 5 - 5 0 g/dL    Total Bilirubin 0 55 0 20 - 1 00 mg/dL    eGFR 92 ml/min/1 73sq m   TSH, 3rd generation   Result Value Ref Range    TSH 3RD GENERATON 2 700 0 358 - 3 740 uIU/mL   Lipid panel   Result Value Ref Range    Cholesterol 164 See Comment mg/dL    Triglycerides 129 See Comment mg/dL    HDL, Direct 45 (L) >=50 mg/dL    LDL Calculated 93 0 - 100 mg/dL    Non-HDL-Chol (CHOL-HDL) 119 mg/dl     Labs, Imaging, & Other studies:   All pertinent labs and imaging studies were personally reviewed      Reviewed test results and discussed with patient  Assessment and plan: Follow-up visit for 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  In 2006 patient  had right mastectomy and left breast reduction surgery  She did not require adjuvant therapy  She is being followed  Patient has been running slightly high tumor marker CA 27-29 and highest was 60  This time tumor marker is 44 9 and that is being monitored   There has not been documented breast cancer recurrence  Patient also has stable thyroid nodules, lung nodules and renal cysts and they are being monitored  Lung nodules are between 2 mm and 4 mm and are stable for the last 5 years or more     History of vitamin-D deficiency and she has been taking vitamin-D   Vitamin-D level came back to within normal range  She will continue taking vitamin-D  Has some tiredness, minor arthritic symptoms in her knees and some anxiety                Physical examination and test results are as recorded and discussed   Breast cancer remains in remission   Stable lung nodules and thyroid nodule and renal cysts and these abnormalities are being monitored by primary physician and consultants  She goes to her gynecologist, urologist and breast surgeon in addition to her primary physician   Monitoring of tumor marker   Goal is cure from breast cancer    Condition and plan discussed in detail  Questions answered   Discussed the importance of self-breast examination, eating healthy foods, exercises as tolerated and health screening tests     Patient is capable of self-care  Discussed precautions against coronavirus    See  diagnoses,orders and instructions below  1  Malignant neoplasm of right breast in female, estrogen receptor negative, unspecified site of breast ([facility] [place] [address] )    - CBC and differential; Future  - Comprehensive metabolic panel; Future  - Cancer antigen 27 29; Future    2  Abnormal tumor markers      3  Multiple renal cysts      4  Thyroid nodule    - US thyroid; Future    5  Lung nodules      Blood work and ultrasound thyroid prior to next visit in 6 months  Patient wants to come back in  6 months                Patient voiced understanding and agrees      Counseling / Coordination of Care       Provided counseling and support

## 2022-05-09 ENCOUNTER — TELEPHONE (OUTPATIENT)
Dept: OBGYN CLINIC | Facility: CLINIC | Age: 67
End: 2022-05-09

## 2022-05-09 ENCOUNTER — HOSPITAL ENCOUNTER (OUTPATIENT)
Dept: RADIOLOGY | Facility: HOSPITAL | Age: 67
Discharge: HOME | End: 2022-05-09
Payer: MEDICARE

## 2022-05-09 DIAGNOSIS — M51.24 DISPLACEMENT OF THORACIC INTERVERTEBRAL DISC WITHOUT MYELOPATHY: ICD-10-CM

## 2022-05-09 PROCEDURE — 72072 X-RAY EXAM THORAC SPINE 3VWS: CPT

## 2022-05-09 PROCEDURE — 71046 X-RAY EXAM CHEST 2 VIEWS: CPT

## 2022-05-09 NOTE — TELEPHONE ENCOUNTER
Patient called  She has her annual scheduled with you in August  She states she had a hysterectomy 10 years ago with one ovary that was removed  She would like to know if there is anything that can be ordered to check her hormone levels?  She has been having hot flashes and mood swings for years but it questioning now if there is any lab tests that she can get done

## 2022-05-10 NOTE — TELEPHONE ENCOUNTER
There really isn't any reason to check her levels, as we know they are decreasing based on her symptoms and age  We can discuss HRT treatment if she is interested in that at her visit  She can try OTC things such as black cohosh

## 2022-06-03 ENCOUNTER — APPOINTMENT (OUTPATIENT)
Dept: LAB | Facility: AMBULARY SURGERY CENTER | Age: 67
End: 2022-06-03
Payer: MEDICARE

## 2022-06-03 DIAGNOSIS — I10 ESSENTIAL HYPERTENSION, MALIGNANT: ICD-10-CM

## 2022-06-03 DIAGNOSIS — D49.3 BREAST TUMOR: ICD-10-CM

## 2022-06-03 DIAGNOSIS — E04.2 NONTOXIC MULTINODULAR GOITER: ICD-10-CM

## 2022-06-03 DIAGNOSIS — M51.24 DISPLACEMENT OF THORACIC INTERVERTEBRAL DISC WITHOUT MYELOPATHY: ICD-10-CM

## 2022-06-03 DIAGNOSIS — E04.1 NONTOXIC UNINODULAR GOITER: ICD-10-CM

## 2022-06-03 DIAGNOSIS — E55.9 AVITAMINOSIS D: ICD-10-CM

## 2022-06-03 DIAGNOSIS — D48.5 NEOPLASM OF UNCERTAIN BEHAVIOR OF SKIN: ICD-10-CM

## 2022-06-03 LAB
25(OH)D3 SERPL-MCNC: 39.3 NG/ML (ref 30–100)
ALBUMIN SERPL BCP-MCNC: 3.6 G/DL (ref 3.5–5)
ALP SERPL-CCNC: 89 U/L (ref 46–116)
ALT SERPL W P-5'-P-CCNC: 27 U/L (ref 12–78)
ANION GAP SERPL CALCULATED.3IONS-SCNC: 4 MMOL/L (ref 4–13)
AST SERPL W P-5'-P-CCNC: 19 U/L (ref 5–45)
BASOPHILS # BLD AUTO: 0.03 THOUSANDS/ΜL (ref 0–0.1)
BASOPHILS NFR BLD AUTO: 1 % (ref 0–1)
BILIRUB SERPL-MCNC: 0.46 MG/DL (ref 0.2–1)
BUN SERPL-MCNC: 17 MG/DL (ref 5–25)
CALCIUM SERPL-MCNC: 9.2 MG/DL (ref 8.3–10.1)
CHLORIDE SERPL-SCNC: 105 MMOL/L (ref 100–108)
CHOLEST SERPL-MCNC: 129 MG/DL
CO2 SERPL-SCNC: 29 MMOL/L (ref 21–32)
CREAT SERPL-MCNC: 0.78 MG/DL (ref 0.6–1.3)
EOSINOPHIL # BLD AUTO: 0.08 THOUSAND/ΜL (ref 0–0.61)
EOSINOPHIL NFR BLD AUTO: 1 % (ref 0–6)
ERYTHROCYTE [DISTWIDTH] IN BLOOD BY AUTOMATED COUNT: 14.2 % (ref 11.6–15.1)
ERYTHROCYTE [SEDIMENTATION RATE] IN BLOOD: 24 MM/HOUR (ref 0–29)
GFR SERPL CREATININE-BSD FRML MDRD: 79 ML/MIN/1.73SQ M
GLUCOSE P FAST SERPL-MCNC: 93 MG/DL (ref 65–99)
HCT VFR BLD AUTO: 39.7 % (ref 34.8–46.1)
HDLC SERPL-MCNC: 48 MG/DL
HGB BLD-MCNC: 13 G/DL (ref 11.5–15.4)
IMM GRANULOCYTES # BLD AUTO: 0.03 THOUSAND/UL (ref 0–0.2)
IMM GRANULOCYTES NFR BLD AUTO: 1 % (ref 0–2)
LDLC SERPL CALC-MCNC: 64 MG/DL (ref 0–100)
LYMPHOCYTES # BLD AUTO: 1.02 THOUSANDS/ΜL (ref 0.6–4.47)
LYMPHOCYTES NFR BLD AUTO: 17 % (ref 14–44)
MCH RBC QN AUTO: 27.6 PG (ref 26.8–34.3)
MCHC RBC AUTO-ENTMCNC: 32.7 G/DL (ref 31.4–37.4)
MCV RBC AUTO: 84 FL (ref 82–98)
MONOCYTES # BLD AUTO: 0.86 THOUSAND/ΜL (ref 0.17–1.22)
MONOCYTES NFR BLD AUTO: 15 % (ref 4–12)
NEUTROPHILS # BLD AUTO: 3.85 THOUSANDS/ΜL (ref 1.85–7.62)
NEUTS SEG NFR BLD AUTO: 65 % (ref 43–75)
NONHDLC SERPL-MCNC: 81 MG/DL
NRBC BLD AUTO-RTO: 0 /100 WBCS
PLATELET # BLD AUTO: 228 THOUSANDS/UL (ref 149–390)
PMV BLD AUTO: 10.7 FL (ref 8.9–12.7)
POTASSIUM SERPL-SCNC: 3.9 MMOL/L (ref 3.5–5.3)
PROT SERPL-MCNC: 6.9 G/DL (ref 6.4–8.2)
RBC # BLD AUTO: 4.71 MILLION/UL (ref 3.81–5.12)
SODIUM SERPL-SCNC: 138 MMOL/L (ref 136–145)
T4 FREE SERPL-MCNC: 0.93 NG/DL (ref 0.76–1.46)
TRIGL SERPL-MCNC: 85 MG/DL
TSH SERPL DL<=0.05 MIU/L-ACNC: 1.13 UIU/ML (ref 0.45–4.5)
WBC # BLD AUTO: 5.87 THOUSAND/UL (ref 4.31–10.16)

## 2022-06-03 PROCEDURE — 36415 COLL VENOUS BLD VENIPUNCTURE: CPT

## 2022-06-03 PROCEDURE — 84439 ASSAY OF FREE THYROXINE: CPT

## 2022-06-03 PROCEDURE — 84443 ASSAY THYROID STIM HORMONE: CPT

## 2022-06-03 PROCEDURE — 80053 COMPREHEN METABOLIC PANEL: CPT

## 2022-06-03 PROCEDURE — 80061 LIPID PANEL: CPT

## 2022-06-03 PROCEDURE — 85652 RBC SED RATE AUTOMATED: CPT

## 2022-06-03 PROCEDURE — 85025 COMPLETE CBC W/AUTO DIFF WBC: CPT

## 2022-06-03 PROCEDURE — 82306 VITAMIN D 25 HYDROXY: CPT

## 2022-07-08 ENCOUNTER — HOSPITAL ENCOUNTER (OUTPATIENT)
Dept: MRI IMAGING | Facility: HOSPITAL | Age: 67
Discharge: HOME | End: 2022-07-08
Payer: MEDICARE

## 2022-07-08 DIAGNOSIS — N28.1 KIDNEY CYST, ACQUIRED: ICD-10-CM

## 2022-07-08 PROCEDURE — G1004 CDSM NDSC: HCPCS

## 2022-07-08 PROCEDURE — 74183 MRI ABD W/O CNTR FLWD CNTR: CPT

## 2022-07-08 PROCEDURE — A9585 GADOBUTROL INJECTION: HCPCS | Performed by: PHYSICIAN ASSISTANT

## 2022-07-08 RX ADMIN — GADOBUTROL 7 ML: 604.72 INJECTION INTRAVENOUS at 11:33

## 2022-07-22 ENCOUNTER — OFFICE VISIT (OUTPATIENT)
Dept: UROLOGY | Facility: CLINIC | Age: 67
End: 2022-07-22
Payer: MEDICARE

## 2022-07-22 VITALS
SYSTOLIC BLOOD PRESSURE: 120 MMHG | HEART RATE: 81 BPM | OXYGEN SATURATION: 97 % | BODY MASS INDEX: 24.14 KG/M2 | WEIGHT: 141.4 LBS | HEIGHT: 64 IN | DIASTOLIC BLOOD PRESSURE: 80 MMHG

## 2022-07-22 DIAGNOSIS — N28.1 KIDNEY CYST, ACQUIRED: Primary | ICD-10-CM

## 2022-07-22 PROCEDURE — 99213 OFFICE O/P EST LOW 20 MIN: CPT | Performed by: PHYSICIAN ASSISTANT

## 2022-07-22 NOTE — PROGRESS NOTES
UROLOGY PROGRESS NOTE   Patient Identifiers: Rosa Moore (MRN [id number])  Date of Service: 7/22/2022    Subjective:    71-year-old female history of right renal complex cyst   She also has a history of breast cancer  Her MRI has been relatively unchanged over now about 3 year time frame  Annual follow-up is still recommended  No voiding complaints  No UTI  Reason for visit:  Complex renal cyst follow-up      Objective:     VITALS:    There were no vitals filed for this visit  LABS:  Lab Results   Component Value Date    HGB 13 0 06/03/2022    HCT 39 7 06/03/2022    WBC 5 87 06/03/2022     06/03/2022   ]    Lab Results   Component Value Date     11/15/2017    K 3 9 06/03/2022     06/03/2022    CO2 29 06/03/2022    BUN 17 06/03/2022    CREATININE 0 78 06/03/2022    CALCIUM 9 2 06/03/2022   ]        INPATIENT MEDS:    Current Outpatient Medications:     aspirin 81 MG tablet, Take 81 mg by mouth daily  , Disp: , Rfl:     Cholecalciferol 1000 units capsule, Take 1,000 Units by mouth daily  , Disp: , Rfl:     Coenzyme Q10 10 MG capsule, Once daily, Disp: , Rfl:     Flaxseed, Linseed, (Flaxseed Oil) 1000 MG CAPS, Take as directed, Disp: , Rfl:     Glucosamine 750 MG TABS, Take 300 mg by mouth daily  , Disp: , Rfl:     hydrochlorothiazide (HYDRODIURIL) 12 5 mg tablet, , Disp: , Rfl:     losartan (COZAAR) 100 MG tablet, Take 100 mg by mouth daily , Disp: , Rfl:     metoprolol succinate (TOPROL-XL) 25 mg 24 hr tablet, , Disp: , Rfl:     Multiple Vitamins-Minerals (MULTI COMPLETE) CAPS, Take by mouth daily  , Disp: , Rfl:     Omega-3 Fatty Acids ([employer] FISH OIL) 1000 MG CAPS, Take by mouth daily  , Disp: , Rfl:     Probiotic Product (PROBIOTIC-10 PO), Take by mouth daily  , Disp: , Rfl:     TURMERIC CURCUMIN PO, by Does not apply route daily  , Disp: , Rfl:       Physical Exam:   There were no vitals taken for this visit    GEN: no acute distress    RESP: breathing comfortably with no accessory muscle use    ABD: soft, non-tender, non-distended   INCISION:    EXT: no significant peripheral edema       RADIOLOGY:   MRI - ABDOMEN - WITH AND WITHOUT CONTRAST   IMPRESSION:     Scattered renal cysts  Some complex cysts with high T1 signal are noted in the right kidney  2 larger lesions are identified laterally  One is stable in size in the lower pole  One 1 8 cm is mildly enlarged as compared to 2021 in the midpole with a hemorrhage fluid level  These still most likely represent hemorrhagic/complicated cyst despite some enlargement given the enhancement features  Assessment:   1   Minimally complex right renal cyst     Plan:   -continue annual follow-up with MRI in 1 year's time  -  -  -

## 2022-07-27 ENCOUNTER — TELEPHONE (OUTPATIENT)
Dept: HEMATOLOGY ONCOLOGY | Facility: CLINIC | Age: 67
End: 2022-07-27

## 2022-07-27 NOTE — TELEPHONE ENCOUNTER
Left voice message for patient about rescheduled appointment  Provided details of new appointment date, time, and location, as well as a callback number in case patient needs to change this appointment

## 2022-08-03 ENCOUNTER — HOSPITAL ENCOUNTER (OUTPATIENT)
Dept: ULTRASOUND IMAGING | Facility: HOSPITAL | Age: 67
Discharge: HOME | End: 2022-08-03
Attending: INTERNAL MEDICINE
Payer: MEDICARE

## 2022-08-03 DIAGNOSIS — E04.1 THYROID NODULE: ICD-10-CM

## 2022-08-03 PROCEDURE — 76536 US EXAM OF HEAD AND NECK: CPT

## 2022-08-12 ENCOUNTER — TELEPHONE (OUTPATIENT)
Dept: OBGYN CLINIC | Facility: CLINIC | Age: 67
End: 2022-08-12

## 2022-08-12 NOTE — TELEPHONE ENCOUNTER
Patient called, wants to know if you'd like her to do a pelvic ultrasound this year before her yearly   If so could you put the script in

## 2022-08-15 DIAGNOSIS — N83.202 LEFT OVARIAN CYST: Primary | ICD-10-CM

## 2022-08-15 NOTE — TELEPHONE ENCOUNTER
Called, madhu on voicemail that script for pelvic us is in chart, gave her Central Scheduling to call and make appt   If she has any questions to call the office

## 2022-08-23 ENCOUNTER — ANNUAL EXAM (OUTPATIENT)
Dept: OBGYN CLINIC | Facility: CLINIC | Age: 67
End: 2022-08-23
Payer: MEDICARE

## 2022-08-23 VITALS
DIASTOLIC BLOOD PRESSURE: 62 MMHG | WEIGHT: 140 LBS | HEIGHT: 64 IN | SYSTOLIC BLOOD PRESSURE: 104 MMHG | BODY MASS INDEX: 23.9 KG/M2

## 2022-08-23 DIAGNOSIS — Z78.0 ASYMPTOMATIC MENOPAUSAL STATE: ICD-10-CM

## 2022-08-23 DIAGNOSIS — Z01.419 WOMEN'S ANNUAL ROUTINE GYNECOLOGICAL EXAMINATION: Primary | ICD-10-CM

## 2022-08-23 PROCEDURE — G0101 CA SCREEN;PELVIC/BREAST EXAM: HCPCS | Performed by: OBSTETRICS & GYNECOLOGY

## 2022-08-23 RX ORDER — PREDNISONE 20 MG/1
20 TABLET ORAL 2 TIMES DAILY
COMMUNITY
Start: 2022-08-18

## 2022-08-23 NOTE — PROGRESS NOTES
ASSESSMENT & PLAN:   Diagnoses and all orders for this visit:    Women's annual routine gynecological examination    Asymptomatic menopausal state  -     DXA bone density spine hip and pelvis; Future    Other orders  -     predniSONE 20 mg tablet; Take 20 mg by mouth 2 (two) times a day (Patient not taking: Reported on 2022)        The following were reviewed in today's visit: ASCCP guidelines (Pap screen not indicated after age 72), STD testing breast self exam, family planning choices, menopause, adequate intake of calcium and vitamin D, exercise, healthy diet and DEXA ordered  Patient to return to office in yearly for annual exam      All questions have been answered to her satisfaction  CC:  Annual Gynecologic Examination  Chief Complaint   Patient presents with    Gynecologic Exam     Pt is here for an annual exam; last pap was 2019 no further paps  Mammograms are ordered by her breast surgeon  Pt is doing well, no complaints  HPI: Agatha Ward is a 78 y o  [id number] who presents for annual gynecologic examination  She has the following concerns:  None       Health Maintenance:    Exercise: frequently  Breast exams/breast awareness: yes  Diet: well balanced diet  Last mammogram: 2022  Colorectal cancer screenin, scheduled for November  DEXA: ordered today     Past Medical History:   Diagnosis Date    Arthritis     Bunion of great toe of right foot     Dental crown present     Environmental allergies     ragweed    Exercises 5 to 6 times per week     treadmill    GERD (gastroesophageal reflux disease)     History of kidney stones     History of Lyme disease     History of palpitations     Kidney cysts     "bilat to be followed in 6 months with CT Scan"    Kidney stone     Left arm pain     "occas related to lifting weights recently"    Limb alert care status     RIGHT ;no BP/IV's    Lung nodule     Malignant neoplasm of upper-outer quadrant of right female breast ([address] )     right    Motion sickness     Thyroid disorder     nodules/enlarged    Tooth missing     Wears glasses        Past Surgical History:   Procedure Laterality Date    ABDOMINOPLASTY       SECTION      COLONOSCOPY      ESOPHAGOGASTRODUODENOSCOPY      HYSTERECTOMY      KNEE ARTHROSCOPY Left     MASTECTOMY Right     WI HALLUX RIGIDUS W/CHEILECTOMY 1ST MP JT W/IMPLT Right 2018    Procedure: CHEILECTOMY HALLUX RIGIDUS; GREAT TOE CARTIVA IMPLANT;  Surgeon: Conchis Starr DPM;  Location: [facility];  Service: Podiatry       Past OB/Gyn History:   No LMP recorded  Patient has had a hysterectomy  Patient is menopausal    Menopausal symptoms: hot flashes   Last Pap: 2019 : no abnormalities; further pap screening not indicated  History of abnormal Pap smear: no    Patient is currently sexually active     C/o vaginal dryness      Family History  Family History   Problem Relation Age of Onset    No Known Problems Mother     Prostate cancer Father     Hypertension Father     Stroke Father     No Known Problems Sister     No Known Problems Daughter     No Known Problems Sister     No Known Problems Sister     No Known Problems Daughter     No Known Problems Daughter        Family history of uterine or ovarian cancer: no  Family history of breast cancer: yes  Family history of colon cancer: no    Social History:  Social History     Socioeconomic History    Marital status: /Civil Union     Spouse name: Not on file    Number of children: Not on file    Years of education: Not on file    Highest education level: Not on file   Occupational History    Not on file   Tobacco Use    Smoking status: Never Smoker    Smokeless tobacco: Never Used   Vaping Use    Vaping Use: Never used   Substance and Sexual Activity    Alcohol use: No    Drug use: No    Sexual activity: Yes     Partners: Male     Birth control/protection: Post-menopausal   Other Topics Concern    Not on file   Social History Narrative    Not on file     Social Determinants of Health     Financial Resource Strain: Not on file   Food Insecurity: Not on file   Transportation Needs: Not on file   Physical Activity: Not on file   Stress: Not on file   Social Connections: Not on file   Intimate Partner Violence: Not on file   Housing Stability: Not on file     Domestic violence screen: negative    Allergies:  No Known Allergies    Medications:    Current Outpatient Medications:     aspirin 81 MG tablet, Take 81 mg by mouth daily  , Disp: , Rfl:     Cholecalciferol 1000 units capsule, Take 1,000 Units by mouth daily  , Disp: , Rfl:     Coenzyme Q10 10 MG capsule, Once daily, Disp: , Rfl:     Flaxseed, Linseed, (Flaxseed Oil) 1000 MG CAPS, Take as directed, Disp: , Rfl:     Glucosamine 750 MG TABS, Take 300 mg by mouth daily  , Disp: , Rfl:     hydrochlorothiazide (HYDRODIURIL) 12 5 mg tablet, , Disp: , Rfl:     losartan (COZAAR) 100 MG tablet, Take 100 mg by mouth daily , Disp: , Rfl:     metoprolol succinate (TOPROL-XL) 25 mg 24 hr tablet, , Disp: , Rfl:     Multiple Vitamins-Minerals (MULTI COMPLETE) CAPS, Take by mouth daily  , Disp: , Rfl:     Omega-3 Fatty Acids ([employer] FISH OIL) 1000 MG CAPS, Take by mouth daily  , Disp: , Rfl:     Probiotic Product (PROBIOTIC-10 PO), Take by mouth daily  , Disp: , Rfl:     TURMERIC CURCUMIN PO, by Does not apply route daily  , Disp: , Rfl:     predniSONE 20 mg tablet, Take 20 mg by mouth 2 (two) times a day (Patient not taking: Reported on 8/23/2022), Disp: , Rfl:     Review of Systems:  Review of Systems   Constitutional: Negative for chills and fever  Respiratory: Negative for cough and shortness of breath  Cardiovascular: Negative for chest pain and palpitations  Gastrointestinal: Positive for abdominal distention (sometimes)  Negative for abdominal pain, blood in stool, constipation, nausea and vomiting     Genitourinary: Positive for dyspareunia and vaginal pain (dryness)  Negative for difficulty urinating, dysuria, frequency, pelvic pain, urgency, vaginal bleeding and vaginal discharge  Neurological: Negative for headaches  Physical Exam:  /62   Ht 5' 4" (1 626 m)   Wt 63 5 kg (140 lb)   BMI 24 03 kg/m²    Physical Exam  Constitutional:       General: She is awake  Appearance: Normal appearance  She is well-developed  Genitourinary:      Vulva and bladder normal       Right Labia: No rash, tenderness or lesions  Left Labia: No tenderness, lesions or rash  No vaginal discharge, erythema, tenderness, bleeding, ulceration, granulation tissue or cuff induration  Moderate vaginal atrophy present  Right Adnexa: not tender, not full and no mass present  Left Adnexa: not tender, not full and no mass present  Cervix is absent  Uterus is absent  No urethral prolapse present  Bladder is not tender  Pelvic exam was performed with patient in the lithotomy position  Breasts:      Right: No mass, skin change, tenderness, axillary adenopathy or supraclavicular adenopathy  Left: No mass, skin change, tenderness, axillary adenopathy or supraclavicular adenopathy  Breast exam comments: Right mastectomy w reconstruction      HENT:      Head: Normocephalic and atraumatic  Cardiovascular:      Rate and Rhythm: Normal rate and regular rhythm  Heart sounds: Normal heart sounds  Pulmonary:      Effort: Pulmonary effort is normal  No tachypnea or respiratory distress  Breath sounds: Normal breath sounds  Abdominal:      General: There is no distension  Palpations: Abdomen is soft  Tenderness: There is no abdominal tenderness  There is no guarding  Musculoskeletal:      Cervical back: Neck supple  Lymphadenopathy:      Upper Body:      Right upper body: No supraclavicular or axillary adenopathy  Left upper body: No supraclavicular or axillary adenopathy     Neurological: General: No focal deficit present  Mental Status: She is alert and oriented to person, place, and time  Psychiatric:         Mood and Affect: Mood normal          Behavior: Behavior normal          Thought Content: Thought content normal          Judgment: Judgment normal    Vitals reviewed

## 2022-08-23 NOTE — PATIENT INSTRUCTIONS
[email or web address]  [facility] [email or web address]      VAGINAL DRYNESS OVERVIEW    Vaginal dryness is a common condition in postmenopausal women (ie, women who have been through menopause)  This condition is also common in women who have had both of their ovaries surgically removed, for example, to treat or prevent cancer  You may hear your health care provider use the term "atrophic vaginitis" or "genitourinary syndrome of menopause "    In some women, vaginal dryness leads to uncomfortable symptoms, such as pain with sex, burning vaginal discomfort or itching, or abnormal vaginal discharge  There may also be related urinary symptoms, such as frequent or painful urination  Other women do not notice bothersome symptoms at all  Fortunately, there are several effective treatments available  VAGINAL DRYNESS CAUSES    The hormone estrogen helps to keep the vagina moist, maintain thickness of the vaginal lining, and keep the tissue flexible (also called "elasticity")  Vaginal dryness occurs when your body produces a decreased amount of estrogen  This can be permanent or temporary and can occur at different times throughout life, such as:    ?At the time of menopause (one year after monthly periods stop)  ? After surgical removal of the ovaries, chemotherapy, or radiation therapy of the pelvis to treat cancer  ? After having a baby in women who are breastfeeding - Estrogen production returns to normal when breastfeeding becomes less frequent or is stopped  ? While using certain medications, such as danazol, medroxyprogesterone (brand names: Provera, Depo-Provera), leuprolide (brand name: Lupron), or nafarelin - When these medications are stopped, estrogen production usually returns to normal     VAGINAL DRYNESS TREATMENT    There are several treatment options for women with vaginal dryness   Some, such as vaginal moisturizers or lubricants, are available without a prescription  Others require a prescription, including a vaginal estrogen cream, tablet, capsule, or ring; an oral medication called ospemifene; and a vaginal tablet called prasterone  These treatments usually work temporarily; your symptoms will return when the treatment is stopped unless your ovaries start producing more estrogen again  Vaginal lubricants and moisturizers -- You can buy these without a prescription in most pharmacies  Vaginal lubricants and moisturizers do not contain any hormones and have virtually no systemic (body-wide) side effects  Possible local side effects include irritation or a burning feeling after application  ?Lubricants are designed to reduce friction and discomfort from dryness during sexual intercourse  The lubricant is applied inside the vagina and/or on the partner's penis or fingers just before sex  Products sold specifically as vaginal lubricants are more effective than lubricants that are not designed for this purpose, such as petroleum jelly  In addition, oil-based lubricants like petroleum jelly, baby oil, or mineral oil can damage latex condoms and/or diaphragms and make them less effective in preventing pregnancy or sexually transmitted infections  Lubricants that are made with water or silicones can be used with latex condoms and diaphragms  Polyurethane condoms can be used with oil-based products  Natural lubricants, such as olive, coconut, avocado, or peanut oil, are easily available products that may be used as a lubricant with sex  However, it is important to know that, like the oil-based lubricants, natural oils are not recommended for use with latex condoms or diaphragms, as they can damage the latex  Water- or silicone-based lubricants are a better choice if you use condoms or a diaphragm  ? Vaginal moisturizers are formulated to allow the vaginal tissues to retain moisture more effectively   Moisturizers are applied into the vagina approximately three times weekly to allow a continuous moisturizing effect  Be sure to check the label to ensure that you are purchasing a moisturizer and not a lubricant  Hand and body lotions and moisturizers should not be used to relieve vaginal dryness since they can be irritating to the vaginal tissues  Vaginal estrogen -- Vaginal estrogen is one of the most effective treatment options for vaginal dryness  Vaginal estrogen requires a prescription from your health care provider  Some women worry about possible side effects from hormones  Very low doses of estrogen can be used to treat vaginal dryness when it is in the form of a vaginal cream or insertable tablet, capsule, or ring  A small amount of estrogen is absorbed into the bloodstream with these vaginal forms, but when used regularly, the level of estrogen in the blood is in the same range as in postmenopausal women who are not using vaginal estrogen  As a result, there is a much lower risk of the side effects people sometimes worry about, such as blood clots, breast cancer, and heart attack, compared with other estrogen-containing products (eg, birth control pills, menopausal hormone therapy)  Several types of vaginal estrogen products are available:    ? Cream - Estrogen cream is measured with an applicator and inserted into the vagina (if inserting the applicator is uncomfortable, you can also use your finger)  The cream is usually inserted every day for two weeks and then one or two times weekly after that  With some doses, you may also need to a take another hormonal medication, called a progestin  This is to prevent the lining of your uterus from building up and becoming precancerous or cancerous  Ask your health care provider whether you need to take a progestin with the vaginal estrogen cream you are using  ?Tablet or capsule - The vaginal estrogen insert is a small tablet or capsule that you put in your vagina   It comes packaged in a disposable applicator  It is usually inserted every day for two weeks and then twice weekly after that  Some women find it uncomfortable to insert the applicator at first, when vaginal dryness is at its worst; if you have this problem, ask your health care provider if you should use another form of vaginal estrogen  ? Ring - The vaginal estrogen ring (brand name: Estring) is a flexible plastic ring you wear inside your vagina all the time  You replace it with a new ring every three months  The ring does not need to be removed during sex or bathing  It cannot be felt by most women or their sexual partners  In women who have previously had a hysterectomy, the ring will sometimes fall out  The estrogen ring used to treat vaginal dryness should not be confused with a different estrogen replacement vaginal ring (brand name: Miranda Marya), which releases a much higher dose of estrogen  The estrogen in the higher dose ring is intended to be absorbed into the body to relieve hot flashes in women going through menopause  How long can I use vaginal estrogen? -- As low-dose vaginal estrogen is associated with few adverse effects, it can probably be used indefinitely, although there are no long-term studies to provide data about the effects over many years  Is vaginal estrogen safe if I have a history of breast cancer? -- The safety of vaginal estrogen in women who have a past history of breast cancer is debated  A small amount of estrogen can be absorbed from the vagina into the bloodstream  If you have a history of breast cancer, talk to your health care provider or oncologist about the potential risks and benefits of vaginal estrogen  Prasterone (dehydroepiandrosterone) -- Prasterone, also known as dehydroepiandrosterone (DHEA), is also an option for women with vaginal dryness due to menopause  It comes in the form of a suppository that you insert into your vagina once a day   Vaginal estrogen therapy is more commonly used than prasterone because vaginal estrogen has been studied more thoroughly and the dosing schedule may be more convenient  However, prasterone is an option for women who cannot take estrogen or prefer to avoid it, but who can use other vaginal hormones  Ospemifene -- Ospemifene is a prescription medication that is similar to estrogen but is not estrogen  In the vaginal tissue, it acts similarly to estrogen  It comes in a pill and is prescribed for women who want to use an estrogen-like medication for vaginal dryness but prefer not to use (or have trouble inserting) a vaginal medication  Ospemifene may cause hot flashes as a side effect; it may also increase the risk of blood clots or uterine cancer  Long-term studies of ospemifene are needed to evaluate the risk of these complications  This medication has not been tested in women who have had breast cancer or are at a high risk of developing breast cancer  Sexual activity -- Vaginal estrogen improves dryness quickly, usually within a few weeks  If sex is not uncomfortable, you may continue to have sex as you treat vaginal dryness  [place] may help the vaginal tissues by keeping them soft and stretchable  If sex continues to be painful despite treatment for vaginal dryness, talk to your health care provider  There may be other things you can try such as pelvic floor physical therapy  [employer]    [email or web address]    [employer]® provides women with naturally powerful remedies for the natural symptoms that occur throughout their lives--from PMS to menopause and everything else along the way  Relizen - relief from menopausal hot flashes  2 tablets, once per day; take for at least 2 months  Revaree - relief from vaginal dryness  Use 1 insert every 2-3 days at bedtime  Store in cool place away from heat and light  Ristela - satisfaction for women   Take 2 tablets once per day, for at least 2 months  Serenol - relief from emotional PMS   Take 2 tablets, once per day, for at least 2 months/     Savings Code : [id number]

## 2022-09-07 ENCOUNTER — TELEPHONE (OUTPATIENT)
Dept: DERMATOLOGY | Facility: CLINIC | Age: 67
End: 2022-09-07

## 2022-09-07 NOTE — TELEPHONE ENCOUNTER
Pt left VM to schedule appt for moles and skin tags on her face  Returned call and left a message to call back to make an appt

## 2022-09-14 ENCOUNTER — HOSPITAL ENCOUNTER (OUTPATIENT)
Dept: ULTRASOUND IMAGING | Facility: HOSPITAL | Age: 67
Discharge: HOME | End: 2022-09-14
Attending: OBSTETRICS & GYNECOLOGY
Payer: MEDICARE

## 2022-09-14 DIAGNOSIS — N83.202 LEFT OVARIAN CYST: ICD-10-CM

## 2022-09-14 PROCEDURE — 76830 TRANSVAGINAL US NON-OB: CPT

## 2022-09-14 PROCEDURE — 76856 US EXAM PELVIC COMPLETE: CPT

## 2022-09-16 ENCOUNTER — TELEPHONE (OUTPATIENT)
Dept: HEMATOLOGY ONCOLOGY | Facility: CLINIC | Age: 67
End: 2022-09-16

## 2022-09-16 NOTE — TELEPHONE ENCOUNTER
Appointment Cancellation Or Reschedule     Person calling In self   Provider Isha   Office Visit Date and Time 9/19 [facility]   Office Visit Location [facility]   Did patient want to reschedule their office appointment? If so, when was it scheduled to? Yes, 9/20 9:20AM   Did you have STAR scheduled for this appointment? no   Do you need STAR set up for your new appointment? If yes, please send to "PATIENT RIDESHARE" pool for STAR rescheduling no   If you are cancelling appointment, can we notify STAR to cancel ride? If yes, please send to "PATIENT RIDESHARE" pool for STAR to cancel service no   Is this patient calling to reschedule an infusion appointment? no   When is their next infusion appointment? no   Is this patient a Chemo patient? no   Reason for Cancellation or Reschedule Eye problem     If the patient is a treatment patient, please route this to the office nurse  If the patient is not on treatment, please route to the office MA  If the patient is a surgical oncology patient, please route to surg/onc clinical pool

## 2022-09-16 NOTE — TELEPHONE ENCOUNTER
Appointment Cancellation Or Reschedule     Person calling In self   Provider Isha   Office Visit Date and Time 9/16 11Am   Office Visit Location [facility]   Did patient want to reschedule their office appointment? If so, when was it scheduled to? Yes, 9/19 11Am   Did you have STAR scheduled for this appointment? no   Do you need STAR set up for your new appointment? If yes, please send to "PATIENT RIDESHARE" pool for STAR rescheduling no   If you are cancelling appointment, can we notify STAR to cancel ride? If yes, please send to "PATIENT RIDESHARE" pool for STAR to cancel service no   Is this patient calling to reschedule an infusion appointment? no   When is their next infusion appointment? no   Is this patient a Chemo patient? no   Reason for Cancellation or Reschedule Eyes swollen, going to ER     If the patient is a treatment patient, please route this to the office nurse  If the patient is not on treatment, please route to the office MA  If the patient is a surgical oncology patient, please route to surg/onc clinical pool

## 2022-09-20 ENCOUNTER — OFFICE VISIT (OUTPATIENT)
Dept: HEMATOLOGY ONCOLOGY | Facility: CLINIC | Age: 67
End: 2022-09-20
Payer: MEDICARE

## 2022-09-20 VITALS
SYSTOLIC BLOOD PRESSURE: 128 MMHG | OXYGEN SATURATION: 99 % | DIASTOLIC BLOOD PRESSURE: 70 MMHG | WEIGHT: 143 LBS | BODY MASS INDEX: 24.41 KG/M2 | RESPIRATION RATE: 16 BRPM | TEMPERATURE: 98.8 F | HEART RATE: 74 BPM | HEIGHT: 64 IN

## 2022-09-20 DIAGNOSIS — R97.8 ABNORMAL TUMOR MARKERS: ICD-10-CM

## 2022-09-20 DIAGNOSIS — C50.911 MALIGNANT NEOPLASM OF RIGHT BREAST IN FEMALE, ESTROGEN RECEPTOR NEGATIVE, UNSPECIFIED SITE OF BREAST (HCC): Primary | ICD-10-CM

## 2022-09-20 DIAGNOSIS — Q61.02 MULTIPLE RENAL CYSTS: ICD-10-CM

## 2022-09-20 DIAGNOSIS — R91.8 LUNG NODULES: ICD-10-CM

## 2022-09-20 DIAGNOSIS — E04.1 THYROID NODULE: ICD-10-CM

## 2022-09-20 DIAGNOSIS — Z17.1 MALIGNANT NEOPLASM OF RIGHT BREAST IN FEMALE, ESTROGEN RECEPTOR NEGATIVE, UNSPECIFIED SITE OF BREAST (HCC): Primary | ICD-10-CM

## 2022-09-20 PROCEDURE — 99214 OFFICE O/P EST MOD 30 MIN: CPT | Performed by: INTERNAL MEDICINE

## 2022-09-20 NOTE — PROGRESS NOTES
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  She did not require adjuvant therapy  She is being followed  Patient has been running slightly high tumor marker CA 27-29 and highest was 60  This time tumor marker is 44 9 and that is being monitored  No documentation of recurrent or metastatic breast cancer  Patient also has stable thyroid nodules, lung nodules and renal cysts and they are being monitored  Lung nodules are between 2 mm and 4 mm and are stable for more than 5 years   Same is for thyroid nodules  Patient prefers to have thyroid ultrasound again next year  She has left ovarian cyst and she follows with her gynecologist   For examination of breast and imaging studies she follows with her breast surgeon  She goes to her urologist for renal cysts     History of vitamin-D deficiency and she has been taking vitamin-D   Vitamin-D level came back to within normal range  the last time  She will continue taking vitamin-D  Has some tiredness,  arthritic symptoms in her knees and  she gets injections in her knees  Occasionally back discomfort  Anxiety  She staying up-to-date with her medical checkups and she follows with her primary physician and other consultants on regular basis                    Current Outpatient Medications:     aspirin 81 MG tablet, Take 81 mg by mouth daily  , Disp: , Rfl:     Cholecalciferol 1000 units capsule, Take 1,000 Units by mouth daily  , Disp: , Rfl:     Coenzyme Q10 10 MG capsule, Once daily, Disp: , Rfl:     Flaxseed, Linseed, (Flaxseed Oil) 1000 MG CAPS, Take as directed, Disp: , Rfl:     Glucosamine 750 MG TABS, Take 300 mg by mouth daily  , Disp: , Rfl:     hydrochlorothiazide (HYDRODIURIL) 12 5 mg tablet, , Disp: , Rfl:     losartan (COZAAR) 100 MG tablet, Take 100 mg by mouth daily , Disp: , Rfl:     metoprolol succinate (TOPROL-XL) 25 mg 24 hr tablet, , Disp: , Rfl:     Multiple Vitamins-Minerals (MULTI COMPLETE) CAPS, Take by mouth daily  , Disp: , Rfl:     Omega-3 Fatty Acids (CVS FISH OIL) 1000 MG CAPS, Take by mouth daily  , Disp: , Rfl:     predniSONE 20 mg tablet, Take 20 mg by mouth 2 (two) times a day (Patient not taking: Reported on 8/23/2022), Disp: , Rfl:     Probiotic Product (PROBIOTIC-10 PO), Take by mouth daily  , Disp: , Rfl:     TURMERIC CURCUMIN PO, by Does not apply route daily  , Disp: , Rfl:     Allergies   Allergen Reactions    Pollen Extract Allergic Rhinitis       Oncology History    No history exists  ROS:  09/20/22 Reviewed 12 systems: See symptoms in HPI:   Presently no neurological, cardiac, pulmonary, GI and  symptoms  Other symptoms are in HPI  No  fever, chills, bleeding, bone pains, skin rash, weight loss, weakness, numbness,  claudication and gait problem  No frequent infections  Not unusually sensitive to heat or cold  No swelling of the ankles  No swollen glands  Patient is anxious  /70 (BP Location: Left arm, Patient Position: Sitting, Cuff Size: Adult)   Pulse 74   Temp 98 8 °F (37 1 °C) (Temporal)   Resp 16   Ht 5' 4" (1 626 m)   Wt 64 9 kg (143 lb)   SpO2 99%   BMI 24 55 kg/m²     Physical Exam:      Alert and oriented and not in distress  Stable vitals  No icterus, no oral thrush , no palpable neck mass,  lung fields to percussion and auscultation, regular heart rate,  soft and non tender abdomen, no palpable abdominal mass, no ascites, no edema of ankles, no calf tenderness, no focal neurological deficit, no skin rash, no palpable lymphadenopathy in the neck and axillary areas,  no clubbing  Patient is anxious  Performance status 0  IMAGING:  IMPRESSION:        1  Stable lobulated mildly complex exophytic cyst arising from the lower pole of the right kidney with a few thin enhancing septations and some internal debris as described above    No solid nodularity or thickened septations  Given the heterogeneity on   T1-weighted sequences of portion of the cyst, this is most consistent with a Bosniak 2F  Continued surveillance recommended with MRI  Next follow-up imaging recommended in 12 months            2  Bilateral simple and right proteinaceous versus hemorrhagic cysts            The study was marked in EPIC for significant notification         Workstation performed: [id number]      Imaging     MRI abdomen w wo contrast (Order: [id number]) - 3/23/2020   IMPRESSION:     Multiple bilateral renal cysts  With respect to the right lower pole cyst(s), there is no suspicious interval change  Periodic reassessment with ultrasound is recommended (in approximately 1 year)   The examination demonstrates a finding requiring imaging follow-up and was logged as such in [address] [place] [address] Pkwy performed: [id number]      Imaging     US kidney and bladder (Order: [id number]) - 6/1/2020   IMPRESSION:     Unchanged bilobed 4 3 x 2 5 x 2 5 cm right renal lower pole mildly complex cyst (series 2 image 10)  This is stable dating back to MR from 12/15/2018, therefore is very likely benign  Based on Bosniak Classification of Cystic Renal Masses, Version   2019, this lesion is a Bosniak IIF cystic renal mass  The large majority of Bosniak IIF masses are benign  When malignant, nearly all are indolent  Generally, Bosniak IIF masses are followed by imaging at 6 months and 12 months, then annually for a total   of 5 years to assess for morphologic change  Recommend next follow-up in 1 year            Workstation performed: [id number]      Imaging    MRI abdomen w wo contrast (Order: [id number]) - 6/9/2021    IMPRESSION:     Stable simple left ovarian cyst                     Workstation performed: [id number]      Imaging    US pelvis complete w transvaginal (Order: [id number]) - 10/19/2020  IMPRESSION:     Stable subcentimeter pulmonary nodules dating back to 2014   According to the Fleischner Society 2017, patients with known malignancy are at increased risk of metastasis and should receive followup CT at intervals appropriate for the type of cancer and   its risk of pulmonary metastases       Redemonstration of diffusely enlarged right thyroid lobe which was previously evaluated with ultrasound      Workstation performed: [id number]      Imaging  IMPRESSION:     No significant interval change from most recent prior examination of August 12, 2021  Specifically, enlarged and heterogeneous thyroid gland with intermixed nodules that are not significantly changed in size or sonographic character from multiple prior   examinations and are therefore, almost certainly benign      Reference: ACR Thyroid Imaging, Reporting and Data System (TI-RADS): White Paper of the [employer]  J AM Geremias Radiol 0801;05:540-482  (additional recommendations based on American Thyroid Association 2015 guidelines )        Workstation performed: [id number]          Imaging    US thyroid (Order: [id number]) - 8/3/2022    IMPRESSION:     Scattered renal cysts      Some complex cysts with high T1 signal are noted in the right kidney  2 larger lesions are identified laterally  One is stable in size in the lower pole    One 1 8 cm is mildly enlarged as compared to 2021 in the midpole with a hemorrhage fluid level      These still most likely represent hemorrhagic/complicated cyst despite some enlargement given the enhancement features      Annual follow-up is advised      Follow-up was marked in the Epic system            Workstation performed: [id number]          Imaging    MRI abdomen w wo contrast (Order: [id number]) - 7/8/2022  IMPRESSION:     No acute osseous abnormality         Workstation performed: [id number]          Imaging    XR spine thoracic 3 vw (Order: [id number]) - 5/9/2022    IMPRESSION:     No acute cardiopulmonary disease                  Workstation performed: [id number]          Imaging    XR chest pa & lateral (Order: [id number]) - 5/9/2022    IMPRESSION:   IMPRESSION:   No MR features to suggest malignancy in the native left or the reconstructed right breast  Intact right sided implant  Recommend routine follow-up left mammogram and breast MRI as clinically indicated  BI-RADS Category 2:   Benign     Date: 06/21/21   Received From: [facility]             FINDINGS:     Status post hysterectomy and right oophorectomy      Left ovary:  2 1 x 0 8 x 0 8 cm  0 7 mL  Left ovarian cystic focus measures 6 x 7 x 7 mm  Prior measurement 9 x 8 x 7 mm  Doppler flow within normal limits      No suspicious adnexal mass or loculated collections  There is no free fluid      IMPRESSION:     1  Slight decrease in size of left ovarian cystic focus                  Workstation performed: [id number]          Imaging    US pelvis complete w transvaginal (Order: [id number]) - 9/14/2022    OSSEOUS STRUCTURES:  No acute fracture or destructive osseous lesion      IMPRESSION:     Stable subcentimeter pulmonary nodules dating back to 2014  According to the Fleischner Society 2017, patients with known malignancy are at increased risk of metastasis and should receive followup CT at intervals appropriate for the type of cancer and   its risk of pulmonary metastases       Redemonstration of diffusely enlarged right thyroid lobe which was previously evaluated with ultrasound      Workstation performed: [id number]          Imaging    CT chest wo contrast (Order: [id number]) - 9/14/2020    Comparison is made with prior exams dating back to 11/30/16  MAMMOGRAM FINDINGS:   The following digital mammographic views were obtained: left craniocaudal, left craniocaudal with tomosynthesis, left mediolateral oblique, and left mediolateral oblique with tomosynthesis   Computer-aided detection was utilized by the radiologist in the    interpretation of this examination  There are scattered fibroglandular densities   (Category B parenchymal pattern )     No suspicious masses, calcifications or other abnormalities are seen  No visible axillary lymph node  Procedure Note    Caleb Krueger MD - 01/03/2022   Formatting of this note might be different from the original    HISTORY:   Patient is 73years old and is seen for screening   The patient has a   history of left breast reduction - benign, right mastectomy more than 10   years ago - malignant and right reconstruction more than 10 years ago   The patient has a history of right breast cancer more than 10 years ago   The patient has the following family history of breast cancer:  female cousin  FILMS COMPARED:   Comparison is made with prior exams dating back to 11/30/16  MAMMOGRAM FINDINGS:   The following digital mammographic views were obtained: left craniocaudal, left craniocaudal with tomosynthesis, left mediolateral oblique, and left mediolateral oblique with tomosynthesis   Computer-aided detection was utilized by the radiologist in the    interpretation of this examination  There are scattered fibroglandular densities   (Category B parenchymal pattern )     No suspicious masses, calcifications or other abnormalities are seen  No visible axillary lymph node  IMPRESSION:   IMPRESSION:   Benign findings on left mammogram      Routine follow-up mammogram in 1 year is recommended  BI-RADS Category 2:   Benign    All Measurements            Exam End: 01/03/22  3:12 PM    Specimen Collected: 01/03/22  3:19 PM Last Resulted: 01/03/22  3:19 PM   Received From: [facility]  Result Received: 07/08/22 10:51 AM        lab results of 09/06/2022  CA 27-29 is 44 9  Normal CBC D  Normal chemistry profile  Labs, Imaging, & Other studies:   All pertinent labs and imaging studies were personally reviewed      Reviewed test results and discussed with patient  Assessment and plan:    In 2006 patient had right mastectomy and left breast reduction surgery  for 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  She did not require adjuvant therapy  She is being followed  Patient has been running slightly high tumor marker CA 27-29 and highest was 60  This time tumor marker is 44 9 and that is being monitored  No documentation of recurrent or metastatic breast cancer  Patient also has stable thyroid nodules, lung nodules and renal cysts and they are being monitored  Lung nodules are between 2 mm and 4 mm and are stable for more than 5 years   Same is for thyroid nodules  Patient prefers to have thyroid ultrasound again next year  She has left ovarian cyst and she follows with her gynecologist   For examination of breast and imaging studies she follows with her breast surgeon  She goes to her urologist for renal cysts     History of vitamin-D deficiency and she has been taking vitamin-D   Vitamin-D level came back to within normal range  the last time  She will continue taking vitamin-D  Has some tiredness,  arthritic symptoms in her knees and  she gets injections in her knees  Occasionally back discomfort  Anxiety  She staying up-to-date with her medical checkups and she follows with her primary physician and other consultants on regular basis             Physical examination and test results are as recorded and discussed   Breast cancer remains in remission   Stable lung nodules and thyroid nodule and renal cysts and slightly smaller ovarian cyst and these abnormalities are being monitored by primary physician and consultants  She goes to her gynecologist, urologist and breast surgeon and other consultants in addition to her primary physician   Monitoring of tumor marker   Goal is cure from breast cancer  Patient wanted to go over her imaging studies and I went over the studies one by one   Condition and plan discussed in detail   Questions answered   Discussed the importance of self-breast examination, eating healthy foods, exercises as tolerated and health screening tests     Patient is capable of self-care  Discussed precautions against coronavirus    See  diagnoses,orders and instructions below  1  Malignant neoplasm of right breast in female, estrogen receptor negative, unspecified site of breast ([facility] [address] )    - CBC and differential; Standing  - Comprehensive metabolic panel; Standing  - Cancer antigen 27 29; Standing  - CBC and differential  - Comprehensive metabolic panel  - Cancer antigen 27 29    2  Abnormal tumor markers    - Cancer antigen 27 29; Standing  - Cancer antigen 27 29    3  Multiple renal cysts      4  Thyroid nodule    - US thyroid; Future    5  Lung nodules     Blood work every 6 months  Visit in 1 year  Ultrasound thyroid prior to that        I used [employer] device to dictate and please call me for mistakes in dictation        Patient voiced understanding and agrees      Counseling / Coordination of Care       Provided counseling and support

## 2022-10-06 ENCOUNTER — OFFICE VISIT (OUTPATIENT)
Dept: GASTROENTEROLOGY | Facility: CLINIC | Age: 67
End: 2022-10-06
Payer: MEDICARE

## 2022-10-06 ENCOUNTER — TELEPHONE (OUTPATIENT)
Dept: GASTROENTEROLOGY | Facility: CLINIC | Age: 67
End: 2022-10-06

## 2022-10-06 VITALS
DIASTOLIC BLOOD PRESSURE: 87 MMHG | WEIGHT: 140 LBS | SYSTOLIC BLOOD PRESSURE: 154 MMHG | BODY MASS INDEX: 23.9 KG/M2 | HEIGHT: 64 IN | HEART RATE: 84 BPM

## 2022-10-06 DIAGNOSIS — K58.9 IRRITABLE BOWEL SYNDROME WITHOUT DIARRHEA: ICD-10-CM

## 2022-10-06 DIAGNOSIS — K21.00 GASTROESOPHAGEAL REFLUX DISEASE WITH ESOPHAGITIS, UNSPECIFIED WHETHER HEMORRHAGE: ICD-10-CM

## 2022-10-06 DIAGNOSIS — C50.911 MALIGNANT NEOPLASM OF RIGHT BREAST IN FEMALE, ESTROGEN RECEPTOR NEGATIVE, UNSPECIFIED SITE OF BREAST (HCC): ICD-10-CM

## 2022-10-06 DIAGNOSIS — Z17.1 MALIGNANT NEOPLASM OF RIGHT BREAST IN FEMALE, ESTROGEN RECEPTOR NEGATIVE, UNSPECIFIED SITE OF BREAST (HCC): ICD-10-CM

## 2022-10-06 DIAGNOSIS — D12.6 ADENOMATOUS POLYP OF COLON, UNSPECIFIED PART OF COLON: ICD-10-CM

## 2022-10-06 DIAGNOSIS — R15.9 INCONTINENCE OF FECES, UNSPECIFIED FECAL INCONTINENCE TYPE: Primary | ICD-10-CM

## 2022-10-06 PROCEDURE — 99214 OFFICE O/P EST MOD 30 MIN: CPT | Performed by: INTERNAL MEDICINE

## 2022-10-06 NOTE — PROGRESS NOTES
[facility] - Outpatient Follow-up Note  Clover Martin 78 y o  female MRN: [id number]  Encounter: [id number]          ASSESSMENT AND PLAN:      1  Incontinence of feces, unspecified fecal incontinence type  Patient is having difficulty holding her bowels back  This happening only about once in a month or so  However she gets and nurse to go to the bathroom  However she is not able to hold back and losing her stool  She denies any rectal bleeding or mucus per rectum  She did have difficult childbirth  She needs to follow with urogynecologist     - Colonoscopy; Future    2  Irritable bowel syndrome without diarrhea  Her diarrhea is rare  And on occasions of diarrhea she becomes incontinent  Long discussion the patient regarding her pelvic floor structure and incontinence  She will follow with urogynecologist regarding this  3  Adenomatous polyp of colon, unspecified part of colon  She has history of adenomatous colon polyp  I have advised patient to get a colonoscopy  I have reassured her that colon polyp is not the reason for incontinence  Her last colonoscopy was five years ago  - Colonoscopy; Future    4  Malignant neoplasm of right breast in female, estrogen receptor negative, unspecified site of breast [facility])  She has history of breast cancer  5  Gastroesophageal reflux disease with esophagitis, unspecified whether hemorrhage  She has history of longstanding gastroesophageal reflux disease  She denies any abdominal pain or discomfort  There is no nausea vomiting  She denies any dysphagia or odynophagia  She denies any chest pain, cough or wheezing  There is no palpitation, orthopnea  She has not had any weight loss or weight gain  - EGD; Future    ______________________________________________________________________    SUBJECTIVE:  Patient has reflux symptoms of heartburn    She has history of longstanding gastroesophageal reflux disease and gastric polyp  Reflux precautions discussed  Etiology of incontinence and loose bowel movement also discussed  REVIEW OF SYSTEMS IS OTHERWISE NEGATIVE        Historical Information   Past Medical History:   Diagnosis Date    Arthritis     Bunion of great toe of right foot     Dental crown present     Environmental allergies     ragweed    Exercises 5 to 6 times per week     treadmill    GERD (gastroesophageal reflux disease)     History of kidney stones     History of Lyme disease     History of palpitations     Kidney cysts     "bilat to be followed in 6 months with CT Scan"    Kidney stone     Left arm pain     "occas related to lifting weights recently"    Limb alert care status     RIGHT ;no BP/IV's    Lung nodule     Malignant neoplasm of upper-outer quadrant of right female breast ([address] )     right    Motion sickness     Thyroid disorder     nodules/enlarged    Tooth missing     Wears glasses      Past Surgical History:   Procedure Laterality Date    ABDOMINOPLASTY       SECTION      COLONOSCOPY      ESOPHAGOGASTRODUODENOSCOPY      HYSTERECTOMY      KNEE ARTHROSCOPY Left     MASTECTOMY Right     MO HALLUX RIGIDUS W/CHEILECTOMY 1ST MP JT W/IMPLT Right 2018    Procedure: CHEILECTOMY HALLUX RIGIDUS; GREAT TOE CARTIVA IMPLANT;  Surgeon: Telma Breaux DPM;  [facility];  Service: Podiatry     Social History   Social History     Substance and Sexual Activity   Alcohol Use No     Social History     Substance and Sexual Activity   Drug Use No     Social History     Tobacco Use   Smoking Status Never Smoker   Smokeless Tobacco Never Used     Family History   Problem Relation Age of Onset    No Known Problems Mother     Prostate cancer Father     Hypertension Father     Stroke Father     No Known Problems Sister     No Known Problems Daughter     No Known Problems Sister     No Known Problems Sister     No Known Problems Daughter     No Known Problems Daughter        Meds/Allergies       Current Outpatient Medications:     aspirin 81 MG tablet    Cholecalciferol 1000 units capsule    Coenzyme Q10 10 MG capsule    Flaxseed, Linseed, (Flaxseed Oil) 1000 MG CAPS    Glucosamine 750 MG TABS    hydrochlorothiazide (HYDRODIURIL) 12 5 mg tablet    losartan (COZAAR) 100 MG tablet    metoprolol succinate (TOPROL-XL) 25 mg 24 hr tablet    Multiple Vitamins-Minerals (MULTI COMPLETE) CAPS    Omega-3 Fatty Acids (CVS FISH OIL) 1000 MG CAPS    Probiotic Product (PROBIOTIC-10 PO)    TURMERIC CURCUMIN PO    predniSONE 20 mg tablet    Allergies   Allergen Reactions    Pollen Extract Allergic Rhinitis           Objective     Blood pressure 154/87, pulse 84, height 5' 4" (1 626 m), weight 63 5 kg (140 lb)  Body mass index is 24 03 kg/m²  PHYSICAL EXAM:      General Appearance:   Alert, cooperative, no distress   HEENT:   Normocephalic, atraumatic, anicteric      Neck:  Supple, symmetrical, trachea midline   Lungs:   Clear to auscultation bilaterally; no rales, rhonchi or wheezing; respirations unlabored    Heart[de-identified]   Regular rate and rhythm; no murmur, rub, or gallop  Abdomen:   Soft, non-tender, non-distended; normal bowel sounds; no masses, no organomegaly    Genitalia:   Deferred    Rectal:   Deferred    Extremities:  No cyanosis, clubbing or edema    Pulses:  2+ and symmetric    Skin:  No jaundice, rashes, or lesions    Lymph nodes:  No palpable cervical lymphadenopathy        Lab Results:   No visits with results within 1 Day(s) from this visit     Latest known visit with results is:   Appointment on 06/03/2022   Component Date Value    WBC 06/03/2022 5 87     RBC 06/03/2022 4 71     Hemoglobin 06/03/2022 13 0     Hematocrit 06/03/2022 39 7     MCV 06/03/2022 84     [facility] 06/03/2022 27 6     MCHC 06/03/2022 32 7     RDW 06/03/2022 14 2     MPV 06/03/2022 10 7     Platelets 15/26/1593 228     nRBC 06/03/2022 0     Neutrophils Relative 06/03/2022 65  Immat GRANS % 06/03/2022 1     Lymphocytes Relative 06/03/2022 17     Monocytes Relative 06/03/2022 15 (A)    Eosinophils Relative 06/03/2022 1     Basophils Relative 06/03/2022 1     Neutrophils Absolute 06/03/2022 3 85     Immature Grans Absolute 06/03/2022 0 03     Lymphocytes Absolute 06/03/2022 1 02     Monocytes Absolute 06/03/2022 0 86     Eosinophils Absolute 06/03/2022 0 08     Basophils Absolute 06/03/2022 0 03     Sodium 06/03/2022 138     Potassium 06/03/2022 3 9     Chloride 06/03/2022 105     CO2 06/03/2022 29     ANION GAP 06/03/2022 4     BUN 06/03/2022 17     Creatinine 06/03/2022 0 78     Glucose, Fasting 06/03/2022 93     Calcium 06/03/2022 9 2     AST 06/03/2022 19     ALT 06/03/2022 27     Alkaline Phosphatase 06/03/2022 89     Total Protein 06/03/2022 6 9     Albumin 06/03/2022 3 6     Total Bilirubin 06/03/2022 0 46     eGFR 06/03/2022 79     Free T4 06/03/2022 0 93     TSH 3RD GENERATON 06/03/2022 1 130     Vit D, 25-Hydroxy 06/03/2022 39 3     Sed Rate 06/03/2022 24     Cholesterol 06/03/2022 129     Triglycerides 06/03/2022 85     HDL, Direct 06/03/2022 48 (A)    LDL Calculated 06/03/2022 64     Non-HDL-Chol (CHOL-HDL) 06/03/2022 81          Radiology Results:   US pelvis complete w transvaginal    Result Date: 9/17/2022  Narrative: PELVIC ULTRASOUND, COMPLETE INDICATION:  The patient is 78years old  E80 [id number]: Unspecified ovarian cyst, left side  COMPARISON: Ultrasound 8/12/2021 TECHNIQUE:   Transabdominal pelvic ultrasound was performed in sagittal and transverse planes with a curvilinear transducer  Additional transvaginal imaging was performed to better evaluate the endometrium and ovaries  Imaging included volumetric sweeps as well as traditional still imaging technique  FINDINGS: Status post hysterectomy and right oophorectomy  Left ovary:  2 1 x 0 8 x 0 8 cm  0 7 mL Left ovarian cystic focus measures 6 x 7 x 7 mm   Prior measurement 9 x 8 x 7 mm  Doppler flow within normal limits  No suspicious adnexal mass or loculated collections  There is no free fluid  Impression: 1  Slight decrease in size of left ovarian cystic focus   Workstation performed: [id number]

## 2022-10-06 NOTE — TELEPHONE ENCOUNTER
Pt seen in office with Dr Neha Wolff, however, did not want to have procedure done on 12/15/22 due to too close to holidays  Pt would like to schedule in January  Since our schedule is not open yet, I informed pt that I would call her to schedule once opens

## 2022-10-25 ENCOUNTER — TELEPHONE (OUTPATIENT)
Dept: GASTROENTEROLOGY | Facility: CLINIC | Age: 67
End: 2022-10-25

## 2022-10-27 ENCOUNTER — TELEPHONE (OUTPATIENT)
Dept: GASTROENTEROLOGY | Facility: CLINIC | Age: 67
End: 2022-10-27

## 2022-10-27 NOTE — TELEPHONE ENCOUNTER
Sukhi Carrillo 28 Assessment    Name: [address] [place] [address] Veronica  YOB: 1955  Last Height: 5' 4" (1 626 m)  Last weight: 63 5 kg (140 lb)  BMI: 24 03 kg/m²  Procedure: Colon/egd  Diagnosis: see orders  Date of procedure: 1/11/22  Prep: Miralax w/ dul  Responsible : yes   Phone#: [phone]  Name completing form: Radha Argueta  Date form completed: 10/27/22      If the patient answers yes to any of these questions, schedule in a hospital  Are you pregnant: No  Do you rely on a wheelchair for mobility: No  Have you been diagnosed with End Stage Renal Disease (ESRD): No  Do you need oxygen during the day: No  Have you had a heart attack or stroke within the past three months: No  Have you had a seizure within the past three months: No  Have you ever been informed by anesthesia that you have a difficult airway: No  Additional Questions  Have you had any cardiac testing or are under the care of a Cardiologist (see cardiac list): No  Cardiac list:   Do you have an implanted cardiac defibrillator: No (Comment:  This patient should be scheduled in the hospital)    Have any bleeding problems, such as anemia or hemophilia (If patient has H&H result below 8, schedule in hospital   H&H must be within 30 days of procedure): No    Had an organ transplant within the past 3 months: No    Do you have any present infections: No  Do you get short of breath when walking a few blocks: No  Have you been diagnosed with diabetes: No  Comments (provide cardiac provider information if applicable):

## 2022-10-27 NOTE — TELEPHONE ENCOUNTER
Scheduled date of EGD/colonoscopy (as of today): 1/11/23  Physician performing EGD/colonoscopy: Dr Snider Standing   Location of EGD/colonoscopy: [facility]  Desired bowel prep reviewed with patient: Miralax w/ dul   Instructions reviewed with patient by: li  Clearances:  N/a

## 2022-12-28 ENCOUNTER — ANESTHESIA (OUTPATIENT)
Dept: ANESTHESIOLOGY | Facility: AMBULATORY SURGERY CENTER | Age: 67
End: 2022-12-28

## 2022-12-28 ENCOUNTER — ANESTHESIA EVENT (OUTPATIENT)
Dept: ANESTHESIOLOGY | Facility: AMBULATORY SURGERY CENTER | Age: 67
End: 2022-12-28

## 2023-01-04 ENCOUNTER — TELEPHONE (OUTPATIENT)
Dept: GASTROENTEROLOGY | Facility: CLINIC | Age: 68
End: 2023-01-04

## 2023-01-04 NOTE — TELEPHONE ENCOUNTER
lmom confirming pt's colonoscopy/egd scheduled on 1/11/23 at [facility] with Dr Sam Lorenzana  Informed [facility] would be calling 1-2 days pior with the arrival time  Informed of clear liquid diet day prior as well as the bowel cleansing preparation  Informed would need a  the day of the procedure due to being under sedation  I asked pt to please call back if has not received instructions or if has any questions

## 2023-01-05 NOTE — TELEPHONE ENCOUNTER
Call from patient with questions regarding her colonoscopy  She would like to know if Medicare is going to cover her colonoscopy? Can she drink lemon water and beef broth? What did her last colonoscopy show? Please return her call

## 2023-01-05 NOTE — TELEPHONE ENCOUNTER
I called and spoke to patient  I advised her to contact her insurance company as well as [facility] billing to find out about her coverage for procedures  I also went over the prep instructions  I provided [facility] billing number  She verbalized understanding   Thank you

## 2023-01-09 ENCOUNTER — NURSE TRIAGE (OUTPATIENT)
Dept: OTHER | Facility: OTHER | Age: 68
End: 2023-01-09

## 2023-01-09 NOTE — TELEPHONE ENCOUNTER
Patient would like a call back after 1000 to advise if she can still have her colonoscopy as scheduled after eating berries and tomatoes over the weekend  Reason for Disposition  • Bowel prep for colonoscopy, questions about    Answer Assessment - Initial Assessment Questions  1  DATE/TIME: "When did you have your colonoscopy?"       1/11/23  2   MAIN CONCERN: "What is your main concern right now?" "What questions do you have?"      Ate berries, tomato over the weekend    Protocols used: COLONOSCOPY SYMPTOMS AND QUESTIONS-ADULT-

## 2023-01-09 NOTE — TELEPHONE ENCOUNTER
Regarding: colonoscopy question  ----- Message from [facility] sent at 1/9/2023  7:51 AM EST -----  "I have a colonoscopy on Wednesday and I forgot and ate a cup of black berries, a half of a tomato, and chickpeas over the weekend  Can I still have the procedure?  I will be in an appointment today so can someone call me either before 0830 or after 1000?"

## 2023-01-09 NOTE — TELEPHONE ENCOUNTER
Per patient's phone call, she would like a call back soon as she needs to know if she should continue with the Colonoscopy prep or not

## 2023-01-10 ENCOUNTER — NURSE TRIAGE (OUTPATIENT)
Dept: OTHER | Facility: OTHER | Age: 68
End: 2023-01-10

## 2023-01-10 NOTE — TELEPHONE ENCOUNTER
Patient would like to know if she should still take her blood pressure medication in the morning before her procedure  As per Colonoscopy prep instructions she should

## 2023-01-10 NOTE — TELEPHONE ENCOUNTER
Reason for Disposition  • Question about upcoming scheduled test, no triage required and triager able to answer question    Answer Assessment - Initial Assessment Questions  1  REASON FOR CALL or QUESTION: "What is your reason for calling today?" or "How can I best help you?" or "What question do you have that I can help answer?"      Should I take my blood pressure medications before my colonoscopy?     Protocols used: INFORMATION ONLY CALL - NO TRIAGE-[facility]

## 2023-01-10 NOTE — TELEPHONE ENCOUNTER
Regarding: medication question  ----- Message from Andrew Drummond sent at 1/10/2023  4:56 PM EST -----  " I have a procedure tomorrow and I forgot to ask if it is okay for me to take my blood pressure medication "

## 2023-01-11 ENCOUNTER — ANESTHESIA (OUTPATIENT)
Dept: GASTROENTEROLOGY | Facility: AMBULATORY SURGERY CENTER | Age: 68
End: 2023-01-11

## 2023-01-11 ENCOUNTER — HOSPITAL ENCOUNTER (OUTPATIENT)
Dept: GASTROENTEROLOGY | Facility: AMBULATORY SURGERY CENTER | Age: 68
Discharge: HOME | End: 2023-01-11

## 2023-01-11 ENCOUNTER — ANESTHESIA EVENT (OUTPATIENT)
Dept: GASTROENTEROLOGY | Facility: AMBULATORY SURGERY CENTER | Age: 68
End: 2023-01-11

## 2023-01-11 VITALS
DIASTOLIC BLOOD PRESSURE: 72 MMHG | TEMPERATURE: 95.4 F | WEIGHT: 130 LBS | HEIGHT: 65 IN | OXYGEN SATURATION: 98 % | BODY MASS INDEX: 21.66 KG/M2 | RESPIRATION RATE: 18 BRPM | SYSTOLIC BLOOD PRESSURE: 156 MMHG | HEART RATE: 76 BPM

## 2023-01-11 DIAGNOSIS — D12.6 ADENOMATOUS POLYP OF COLON, UNSPECIFIED PART OF COLON: ICD-10-CM

## 2023-01-11 DIAGNOSIS — R15.2 INCONTINENCE OF FECES WITH FECAL URGENCY: Primary | ICD-10-CM

## 2023-01-11 DIAGNOSIS — R15.9 INCONTINENCE OF FECES WITH FECAL URGENCY: Primary | ICD-10-CM

## 2023-01-11 DIAGNOSIS — R15.9 INCONTINENCE OF FECES, UNSPECIFIED FECAL INCONTINENCE TYPE: ICD-10-CM

## 2023-01-11 DIAGNOSIS — K21.00 GASTROESOPHAGEAL REFLUX DISEASE WITH ESOPHAGITIS, UNSPECIFIED WHETHER HEMORRHAGE: ICD-10-CM

## 2023-01-11 RX ORDER — PROPOFOL 10 MG/ML
INJECTION, EMULSION INTRAVENOUS AS NEEDED
Status: DISCONTINUED | OUTPATIENT
Start: 2023-01-11 | End: 2023-01-11

## 2023-01-11 RX ORDER — LIDOCAINE HYDROCHLORIDE 20 MG/ML
INJECTION, SOLUTION EPIDURAL; INFILTRATION; INTRACAUDAL; PERINEURAL AS NEEDED
Status: DISCONTINUED | OUTPATIENT
Start: 2023-01-11 | End: 2023-01-11

## 2023-01-11 RX ADMIN — PROPOFOL 20 MG: 10 INJECTION, EMULSION INTRAVENOUS at 09:28

## 2023-01-11 RX ADMIN — PROPOFOL 50 MG: 10 INJECTION, EMULSION INTRAVENOUS at 09:20

## 2023-01-11 RX ADMIN — PROPOFOL 20 MG: 10 INJECTION, EMULSION INTRAVENOUS at 09:26

## 2023-01-11 RX ADMIN — PROPOFOL 20 MG: 10 INJECTION, EMULSION INTRAVENOUS at 09:15

## 2023-01-11 RX ADMIN — PROPOFOL 20 MG: 10 INJECTION, EMULSION INTRAVENOUS at 09:17

## 2023-01-11 RX ADMIN — PROPOFOL 20 MG: 10 INJECTION, EMULSION INTRAVENOUS at 09:12

## 2023-01-11 RX ADMIN — PROPOFOL 20 MG: 10 INJECTION, EMULSION INTRAVENOUS at 09:34

## 2023-01-11 RX ADMIN — PROPOFOL 100 MG: 10 INJECTION, EMULSION INTRAVENOUS at 09:10

## 2023-01-11 RX ADMIN — PROPOFOL 20 MG: 10 INJECTION, EMULSION INTRAVENOUS at 09:23

## 2023-01-11 RX ADMIN — LIDOCAINE HYDROCHLORIDE 80 MG: 20 INJECTION, SOLUTION EPIDURAL; INFILTRATION; INTRACAUDAL; PERINEURAL at 09:09

## 2023-01-11 RX ADMIN — PROPOFOL 20 MG: 10 INJECTION, EMULSION INTRAVENOUS at 09:31

## 2023-01-11 NOTE — ANESTHESIA POSTPROCEDURE EVALUATION
Post-Op Assessment Note    CV Status:  Stable  Pain Score: 0    Pain management: adequate     Mental Status:  Awake   Hydration Status:  Stable   PONV Controlled:  Controlled   Airway Patency:  Patent      Post Op Vitals Reviewed: Yes      Staff: CRNA         No notable events documented      BP  158/78   Temp     Pulse  71   Resp   11   SpO2   99

## 2023-01-11 NOTE — DISCHARGE SUMMARY
Discharge Summary - Clover Martin 78 y o  female MRN: [id number]    Unit/Bed#:  Encounter: [id number]    Admission Date: 1/11/2023    Admitting Diagnosis: Incontinence of feces, unspecified fecal incontinence type [R15 9]  Adenomatous polyp of colon, unspecified part of colon [D12 6]  Gastroesophageal reflux disease with esophagitis, unspecified whether hemorrhage [K21 00]    HPI: History of colon polyp  Incontinence  Reflux  Procedures Performed: No orders of the defined types were placed in this encounter  Summary of Hospital Course: Tolerated procedure well    Significant Findings, Care, Treatment and Services Provided: Slice patulousness of the lower esophageal sphincter noted  Otherwise normal endoscopy  Colonoscopy does not show any polyp  Redundant and long colon  Possible anal sphincter deficiency anteriorly    Complications: None    Discharge Diagnosis: See above    Medical Problems     Resolved Problems  Date Reviewed: 8/23/2022   None         Condition at Discharge: good         Discharge instructions/Information to patient and family:   See after visit summary for information provided to patient and family  Provisions for Follow-Up Care:  See after visit summary for information related to follow-up care and any pertinent home health orders        PCP: Tati Gross MD    Disposition: Home

## 2023-01-11 NOTE — INTERVAL H&P NOTE
H&P reviewed  After examining the patient I find no changes in the patients condition since the H&P had been written      Vitals:    01/11/23 0836   BP: (!) 180/77   Pulse: 72   Resp: 18   Temp: (!) 95 4 °F (35 2 °C)   SpO2: 99%

## 2023-01-11 NOTE — H&P
History and Physical - [facility]  Joseph Amos Benedictoantoniosamyjonathan 78 y o  female MRN: [id number]                  HPI: Wilma Machado is a 78y o  year old female who presents for screening colonoscopy  History of adenomatous and hyperplastic colon polyp  Last colonoscopy 5 years ago  Patient has history of incontinence  REVIEW OF SYSTEMS: Per the HPI, and otherwise unremarkable      Historical Information   Past Medical History:   Diagnosis Date   • Arthritis    • Bowel incontinence     mild   • Bunion of great toe of right foot    • Colon polyp    • Dental crown present    • Environmental allergies     ragweed   • Exercises 5 to 6 times per week     treadmill   • GERD (gastroesophageal reflux disease)    • History of kidney stones    • History of Lyme disease    • History of palpitations    • Hypertension    • Kidney cysts     "bilat to be followed in 6 months with CT Scan"   • Left arm pain     "occas related to lifting weights recently"   • Limb alert care status     RIGHT ;no BP/IV's   • Lung nodule    • Malignant neoplasm of upper-outer quadrant of right female breast ([address] )     right   • Motion sickness    • Thyroid disorder     nodules/enlarged   • Tooth missing    • Wears glasses      Past Surgical History:   Procedure Laterality Date   • ABDOMINOPLASTY     • BREAST SURGERY Right     CA   •  SECTION     • COLONOSCOPY     • ESOPHAGOGASTRODUODENOSCOPY     • HYSTERECTOMY     • KNEE ARTHROSCOPY Left    • MASTECTOMY Right    • CA HALLUX RIGIDUS W/CHEILECTOMY 1ST MP JT W/IMPLT Right 2018    Procedure: CHEILECTOMY HALLUX RIGIDUS; GREAT TOE CARTIVA IMPLANT;  Surgeon: Donnie Valdez DPM;  Location: [facility] OR;  Service: Podiatry     Social History   Social History     Substance and Sexual Activity   Alcohol Use No     Social History     Substance and Sexual Activity   Drug Use No     Social History     Tobacco Use   Smoking Status Never   Smokeless Tobacco Never     Family History   Problem Relation Age of Onset   • No Known Problems Mother    • Prostate cancer Father    • Hypertension Father    • Stroke Father    • No Known Problems Sister    • No Known Problems Daughter    • No Known Problems Sister    • No Known Problems Sister    • No Known Problems Daughter    • No Known Problems Daughter        Meds/Allergies       Current Outpatient Medications:   •  aspirin 81 MG tablet  •  Cholecalciferol 1000 units capsule  •  Coenzyme Q10 10 MG capsule  •  Flaxseed, Linseed, (Flaxseed Oil) 1000 MG CAPS  •  Glucosamine 750 MG TABS  •  hydrochlorothiazide (HYDRODIURIL) 12 5 mg tablet  •  losartan (COZAAR) 100 MG tablet  •  Multiple Vitamins-Minerals (MULTI COMPLETE) CAPS  •  Omega-3 Fatty Acids ([employer] FISH OIL) 1000 MG CAPS  •  TURMERIC CURCUMIN PO  •  metoprolol succinate (TOPROL-XL) 25 mg 24 hr tablet    Allergies   Allergen Reactions   • Pollen Extract Allergic Rhinitis       Objective     BP (!) 180/77   Pulse 72   Temp (!) 95 4 °F (35 2 °C) (Skin)   Resp 18   Ht 5' 5" (1 651 m)   Wt 59 kg (130 lb)   SpO2 99%   BMI 21 63 kg/m²       PHYSICAL EXAM    Gen: NAD  Head: NCAT  CV: RRR  CHEST: Clear  ABD: soft, NT/ND  EXT: no edema      ASSESSMENT/PLAN:  This is a 78y o  year old female here for colonoscopy, and she is stable and optimized for her procedure

## 2023-01-11 NOTE — ANESTHESIA PREPROCEDURE EVALUATION
Procedure:  COLONOSCOPY  EGD    Relevant Problems   /RENAL   (+) Multiple renal cysts      GYN   (+) Cancer of breast, female ([facility] [address] )      NEURO/PSYCH   (+) History of breast cancer   (+) History of ductal carcinoma in situ (DCIS) of breast        Physical Exam    Airway    Mallampati score: II  TM Distance: >3 FB  Neck ROM: full     Dental       Cardiovascular      Pulmonary      Other Findings        Anesthesia Plan  ASA Score- 2     Anesthesia Type- IV sedation with anesthesia with ASA Monitors  Additional Monitors:   Airway Plan:           Plan Factors-Exercise tolerance (METS): >4 METS  Chart reviewed  Patient is not a current smoker  Patient did not smoke on day of surgery  Obstructive sleep apnea risk education given perioperatively  Induction- intravenous  Postoperative Plan-     Informed Consent- Anesthetic plan and risks discussed with patient  I personally reviewed this patient with the CRNA  Discussed and agreed on the Anesthesia Plan with the CRNA             NPO appropriate  Discussed benefits/risks of monitored anesthetic care and discussed providing a dynamic level of mild to deep sedation  Risks include awareness, airway obstruction, aspiration which may necessitate conversion to general anesthesia  All questions answered  Patient understands and wishes to proceed  Anesthesia plan and consent discussed with Clover who expressed understanding and agreement  Risks/benefits and alternatives discussed with patient including possible PONV, sore throat, damage to teeth/lips/gums and possibility of rare anesthetic and surgical emergencies

## 2023-01-24 ENCOUNTER — HOSPITAL ENCOUNTER (OUTPATIENT)
Dept: RADIOLOGY | Facility: HOSPITAL | Age: 68
Discharge: HOME | End: 2023-01-24
Attending: INTERNAL MEDICINE

## 2023-01-24 DIAGNOSIS — J18.9 PNEUMONIA OF BOTH LUNGS DUE TO INFECTIOUS ORGANISM, UNSPECIFIED PART OF LUNG: ICD-10-CM

## 2023-02-06 ENCOUNTER — HOSPITAL ENCOUNTER (OUTPATIENT)
Dept: RADIOLOGY | Age: 68
Discharge: HOME | End: 2023-02-06

## 2023-02-06 VITALS — WEIGHT: 133 LBS | HEIGHT: 64 IN | BODY MASS INDEX: 22.71 KG/M2

## 2023-02-06 DIAGNOSIS — Z78.0 ASYMPTOMATIC MENOPAUSAL STATE: ICD-10-CM

## 2023-02-08 NOTE — RESULT ENCOUNTER NOTE
Jer Galo    Your DEXA scan shows osteopenia, or low bone mass  The 10 year risk of hip fracture is below the threshold for treatment  See below for some preventative measures against worsening bone density  Please contact the office with any questions  Low bone mass (osteopenia) - Low bone mass (osteopenia) is the term health care providers use to describe bone density that is lower than normal but that has not yet reached the low levels seen with osteoporosis  A person with osteopenia does not yet have osteoporosis but is at risk of developing it  People with osteopenia have a T-score between -1 1 and -2 4  OSTEOPOROSIS PREVENTION  Some of the most important treatments for preventing osteoporosis include diet, exercise, and not smoking  Diet - An optimal diet for preventing or treating osteoporosis includes consuming an adequate number of protein and calories as well as optimal amounts of calcium and vitamin D, which are essential in helping to maintain proper bone formation and density  Calcium intake - Experts recommend that premenopausal women and men consume at least 1000 mg of calcium per day; this includes calcium in foods and beverages plus calcium supplements  Postmenopausal women should consume 1200 mg of calcium per day (total of diet plus supplements)  However, you should not take more than 2000 mg calcium per day, due to the possibility of side effects  The main dietary sources of calcium include milk and other dairy products, such as cottage cheese, yogurt, or hard cheese, and green vegetables, such as kale and broccoli  A rough method of estimating dietary calcium intake is to multiply the number of dairy servings consumed each day by 300 mg  One serving is 8 oz of milk (236 mL) or yogurt (224 g), 1 oz (28 g) of hard cheese, or 16 oz (448 g) of cottage cheese      Calcium supplements (calcium carbonate or calcium citrate) may be suggested for women who cannot get enough calcium in their diet  Supplemental calcium doses greater than 500 mg/day should be taken in divided doses (eg, once in morning and evening)  Vitamin D intake - Experts recommend that postmenopausal women consume 800 international units of vitamin D each day  This dose appears to reduce bone loss and fracture rate in older women and men who have adequate calcium intake (described above)  Although the optimal intake has not been clearly established in premenopausal women or in younger men with osteoporosis, 600 international units of vitamin D daily is generally suggested  Milk supplemented with vitamin D is a primary dietary source of dietary vitamin D; it contains approximately 100 international units per 8 oz (236 mL)  Another good source is salmon, with approximately 600 international units per 3 5 oz (98 g) serving  Experts recommend vitamin D supplementation for all patients with osteoporosis whose intake of vitamin D is below 600 to 800 international units per day  Protein supplements - Protein supplements may be recommended in some people to ensure sufficient protein intake  This may be particularly important for those who have already had an osteoporotic fracture  Alcohol, caffeine, and salt intake - Drinking alcohol excessively (more than two drinks a day) can increase the risk of fracture due to an increased risk of falling, poor nutrition, etc, so it should be avoided  Restricting caffeine or salt has not been proven to prevent bone loss in people who consume an adequate amount of calcium  Exercise - Exercise may decrease fracture risk by improving bone mass in premenopausal women and helping to maintain bone density for women after menopause  Furthermore, exercise may decrease the tendency to fall due to weakness  Physical activity reduces the risk of hip fracture in older women as a result of increased muscle strength   Most experts recommend exercising for at least 30 minutes three times per week  The benefits of exercise are quickly lost when a person stops exercising  A regular, weightbearing exercise regimen that a person enjoys improves the chances that the person will continue it over the long term  Smoking - Stopping smoking is strongly recommended for bone health because smoking cigarettes is known to speed bone loss  One study suggested that women who smoke one pack per day throughout adulthood have a 5 to 10 percent reduction in bone density by menopause, resulting in an increased risk of fracture

## 2023-07-14 ENCOUNTER — HOSPITAL ENCOUNTER (OUTPATIENT)
Dept: MRI IMAGING | Facility: HOSPITAL | Age: 68
Discharge: HOME | End: 2023-07-14
Payer: MEDICARE

## 2023-07-14 DIAGNOSIS — N28.1 KIDNEY CYST, ACQUIRED: ICD-10-CM

## 2023-07-14 PROCEDURE — G1004 CDSM NDSC: HCPCS

## 2023-07-14 PROCEDURE — 74183 MRI ABD W/O CNTR FLWD CNTR: CPT

## 2023-07-14 PROCEDURE — A9585 GADOBUTROL INJECTION: HCPCS | Performed by: PHYSICIAN ASSISTANT

## 2023-07-14 RX ADMIN — GADOBUTROL 6 ML: 604.72 INJECTION INTRAVENOUS at 10:42

## 2023-07-25 ENCOUNTER — OFFICE VISIT (OUTPATIENT)
Dept: UROLOGY | Facility: CLINIC | Age: 68
End: 2023-07-25
Payer: MEDICARE

## 2023-07-25 ENCOUNTER — TELEPHONE (OUTPATIENT)
Dept: UROLOGY | Facility: CLINIC | Age: 68
End: 2023-07-25

## 2023-07-25 VITALS
HEIGHT: 64 IN | BODY MASS INDEX: 23.22 KG/M2 | WEIGHT: 136 LBS | DIASTOLIC BLOOD PRESSURE: 80 MMHG | HEART RATE: 85 BPM | OXYGEN SATURATION: 98 % | SYSTOLIC BLOOD PRESSURE: 130 MMHG

## 2023-07-25 DIAGNOSIS — N28.1 KIDNEY CYST, ACQUIRED: Primary | ICD-10-CM

## 2023-07-25 PROCEDURE — 99213 OFFICE O/P EST LOW 20 MIN: CPT | Performed by: PHYSICIAN ASSISTANT

## 2023-07-25 NOTE — PROGRESS NOTES
UROLOGY PROGRESS NOTE   Patient Identifiers: Franco Terrell (MRN [id number])  Date of Service: 7/25/2023    Subjective:   50-year-old female history of complex right renal cyst.  She has history of breast cancer. This is been under surveillance through MRI over the last several years with no significant change. Her most recent MRI reports bilateral Bosniak 1 and 2 cysts requiring no further follow-up. She does not have any lower urinary tract issues or UTI. Reason for visit: Kidney cyst follow-up    Objective:     VITALS:    There were no vitals filed for this visit. LABS:  Lab Results   Component Value Date    HGB 13.0 06/03/2022    HCT 39.7 06/03/2022    WBC 5.87 06/03/2022     06/03/2022   ]    Lab Results   Component Value Date     11/15/2017    K 3.9 06/03/2022     06/03/2022    CO2 29 06/03/2022    BUN 17 06/03/2022    CREATININE 0.78 06/03/2022    CALCIUM 9.2 06/03/2022   ]        INPATIENT MEDS:    Current Outpatient Medications:   •  aspirin 81 MG tablet, Take 81 mg by mouth daily  , Disp: , Rfl:   •  Cholecalciferol 1000 units capsule, Take 1,000 Units by mouth daily  , Disp: , Rfl:   •  Coenzyme Q10 10 MG capsule, Once daily, Disp: , Rfl:   •  Flaxseed, Linseed, (Flaxseed Oil) 1000 MG CAPS, Take as directed, Disp: , Rfl:   •  Glucosamine 750 MG TABS, Take 300 mg by mouth daily  , Disp: , Rfl:   •  hydrochlorothiazide (HYDRODIURIL) 12.5 mg tablet, , Disp: , Rfl:   •  losartan (COZAAR) 100 MG tablet, Take 100 mg by mouth daily , Disp: , Rfl:   •  metoprolol succinate (TOPROL-XL) 25 mg 24 hr tablet, , Disp: , Rfl:   •  Multiple Vitamins-Minerals (MULTI COMPLETE) CAPS, Take by mouth daily  , Disp: , Rfl:   •  Omega-3 Fatty Acids ([employer] FISH OIL) 1000 MG CAPS, Take by mouth daily  , Disp: , Rfl:   •  TURMERIC CURCUMIN PO, by Does not apply route daily  , Disp: , Rfl:       Physical Exam:   There were no vitals taken for this visit.   GEN: no acute distress    RESP: breathing comfortably with no accessory muscle use    ABD: soft, non-tender, non-distended   INCISION:    EXT: no significant peripheral edema       RADIOLOGY:   MRI - ABDOMEN - WITH AND WITHOUT CONTRAST   IMPRESSION:     Bosniak 1 and 2 cysts throughout both kidneys do not need further follow-up. No suspicious renal lesions.       Assessment:   #1.  Bilateral Bosniak 1 and 2 kidney cysts    Plan:   -Follow-up in 1 year with ultrasound prior to visit  -  -  -

## 2023-07-25 NOTE — TELEPHONE ENCOUNTER
Cesar Watson just checked out and was surprised she didn't have blood work ordered for next year. She just wanted to double check to make sure that's correct.  TY

## 2023-08-21 ENCOUNTER — HOSPITAL ENCOUNTER (OUTPATIENT)
Dept: ULTRASOUND IMAGING | Facility: HOSPITAL | Age: 68
Discharge: HOME | End: 2023-08-21
Attending: INTERNAL MEDICINE
Payer: MEDICARE

## 2023-08-21 DIAGNOSIS — E04.1 THYROID NODULE: ICD-10-CM

## 2023-08-21 PROCEDURE — 76536 US EXAM OF HEAD AND NECK: CPT

## 2023-08-29 ENCOUNTER — ANNUAL EXAM (OUTPATIENT)
Dept: OBGYN CLINIC | Facility: CLINIC | Age: 68
End: 2023-08-29
Payer: MEDICARE

## 2023-08-29 VITALS
HEIGHT: 64 IN | DIASTOLIC BLOOD PRESSURE: 74 MMHG | WEIGHT: 135 LBS | SYSTOLIC BLOOD PRESSURE: 128 MMHG | BODY MASS INDEX: 23.05 KG/M2

## 2023-08-29 DIAGNOSIS — Z17.1 MALIGNANT NEOPLASM OF RIGHT BREAST IN FEMALE, ESTROGEN RECEPTOR NEGATIVE, UNSPECIFIED SITE OF BREAST (HCC): ICD-10-CM

## 2023-08-29 DIAGNOSIS — N95.2 POSTMENOPAUSAL ATROPHIC VAGINITIS: ICD-10-CM

## 2023-08-29 DIAGNOSIS — Z01.419 WOMEN'S ANNUAL ROUTINE GYNECOLOGICAL EXAMINATION: Primary | ICD-10-CM

## 2023-08-29 DIAGNOSIS — C50.911 MALIGNANT NEOPLASM OF RIGHT BREAST IN FEMALE, ESTROGEN RECEPTOR NEGATIVE, UNSPECIFIED SITE OF BREAST (HCC): ICD-10-CM

## 2023-08-29 DIAGNOSIS — N83.202 LEFT OVARIAN CYST: ICD-10-CM

## 2023-08-29 PROBLEM — M25.462 EFFUSION OF LEFT KNEE: Status: ACTIVE | Noted: 2023-02-15

## 2023-08-29 PROCEDURE — G0101 CA SCREEN;PELVIC/BREAST EXAM: HCPCS | Performed by: OBSTETRICS & GYNECOLOGY

## 2023-08-29 RX ORDER — EPINEPHRINE 0.3 MG/.3ML
INJECTION SUBCUTANEOUS
COMMUNITY
Start: 2023-05-26

## 2023-08-29 RX ORDER — MELOXICAM 7.5 MG/1
7.5 TABLET ORAL
COMMUNITY
Start: 2023-03-23

## 2023-08-29 NOTE — PROGRESS NOTES
ASSESSMENT & PLAN:   Diagnoses and all orders for this visit:    Women's annual routine gynecological examination    Malignant neoplasm of right breast in female, estrogen receptor negative, unspecified site of breast ([address]) - history of  -     Ambulatory Referral to Surgical Oncology; Future    Left ovarian cyst  -     US pelvis complete w transvaginal; Future    Other orders  -     EPINEPHrine (EPIPEN) 0.3 mg/0.3 mL SOAJ; AS DIRECTED- INJECT AT INSECT STING  -     meloxicam (MOBIC) 7.5 mg tablet; Take 7.5 mg by mouth    Postmenopausal atrophic vaginitis   - consider prasterone. Information in AVS. Can call/message for RX. The following were reviewed in today's visit: ASCCP guidelines (Pap screen not indicated after age 72), STD testing breast self exam, use and side effects of HRT, menopause, osteoporosis and adequate intake of calcium and vitamin D. Patient to return to office in yearly for annual exam.     All questions have been answered to her satisfaction. CC:  Annual Gynecologic Examination  Chief Complaint   Patient presents with   • Gynecologic Exam     Pt is here for her annual exam.  pap 2019 NILM, neg HPV  No cervix, no further paps   breast surgeon orders mammo  colonoscopy 23 - repeat 7 years       HPI: Nhung Lion is a 80 y.o. [id number] who presents for annual gynecologic examination. She has the following concerns:  Needs a new breast surgeon, h/o breast cancer.        Health Maintenance:    Exercise: frequently  Breast exams/breast awareness: occasionally   Last mammogram: 1/10/23  Colorectal cancer screenin  DEXA: 2023    Past Medical History:   Diagnosis Date   • Arthritis    • Bowel incontinence     mild   • Bunion of great toe of right foot    • Colon polyp    • Dental crown present    • Environmental allergies     ragweed   • Exercises 5 to 6 times per week     treadmill   • GERD (gastroesophageal reflux disease)    • History of kidney stones    • History of Lyme disease    • History of palpitations    • Hypertension    • Kidney cysts     "bilat to be followed in 6 months with CT Scan"   • Left arm pain     "occas related to lifting weights recently"   • Limb alert care status     RIGHT ;no BP/IV's   • Lung nodule    • Malignant neoplasm of upper-outer quadrant of right female breast ([address])     right   • Motion sickness    • Thyroid disorder     nodules/enlarged   • Tooth missing    • Wears glasses        Past Surgical History:   Procedure Laterality Date   • ABDOMINOPLASTY     • BREAST SURGERY Right     CA   •  SECTION     • COLONOSCOPY     • ESOPHAGOGASTRODUODENOSCOPY     • HYSTERECTOMY     • KNEE ARTHROSCOPY Left    • MASTECTOMY Right    • PA HALLUX RIGIDUS W/CHEILECTOMY 1ST MP JT W/IMPLT Right 2018    Procedure: CHEILECTOMY HALLUX RIGIDUS; GREAT TOE CARTIVA IMPLANT;  Surgeon: Marga Mike DPM;  Location: [facility];  Service: Podiatry   • US GUIDED THYROID BIOPSY  2020       Past OB/Gyn History:   No LMP recorded. Patient has had a hysterectomy. Patient is menopausal.   Menopausal symptoms: None  Last Pap: 2019 : no abnormalities; further pap screening not indicated  History of abnormal Pap smear: no    Patient is currently sexually active. Very uncomfortable. Discussed prasterone.      Family History  Family History   Problem Relation Age of Onset   • No Known Problems Mother    • Prostate cancer Father    • Hypertension Father    • Stroke Father    • No Known Problems Sister    • No Known Problems Daughter    • No Known Problems Sister    • No Known Problems Sister    • No Known Problems Daughter    • No Known Problems Daughter        Family history of uterine or ovarian cancer: no  Family history of breast cancer: no  Family history of colon cancer: no    Social History:  Social History     Socioeconomic History   • Marital status: /Civil Union     Spouse name: Not on file   • Number of children: Not on file   • Years of education: Not on file   • Highest education level: Not on file   Occupational History   • Not on file   Tobacco Use   • Smoking status: Never   • Smokeless tobacco: Never   Vaping Use   • Vaping Use: Never used   Substance and Sexual Activity   • Alcohol use: No   • Drug use: No   • Sexual activity: Yes     Partners: Male     Birth control/protection: Post-menopausal   Other Topics Concern   • Not on file   Social History Narrative   • Not on file     Social Determinants of Health     Financial Resource Strain: Not on file   Food Insecurity: Not on file   Transportation Needs: Not on file   Physical Activity: Not on file   Stress: Not on file   Social Connections: Not on file   Intimate Partner Violence: Not on file   Housing Stability: Not on file     Domestic violence screen: negative    Allergies:   Allergies   Allergen Reactions   • Pollen Extract Allergic Rhinitis       Medications:    Current Outpatient Medications:   •  aspirin 81 MG tablet, Take 81 mg by mouth daily  , Disp: , Rfl:   •  Cholecalciferol 1000 units capsule, Take 1,000 Units by mouth daily  , Disp: , Rfl:   •  Coenzyme Q10 10 MG capsule, Once daily, Disp: , Rfl:   •  EPINEPHrine (EPIPEN) 0.3 mg/0.3 mL SOAJ, AS DIRECTED- INJECT AT INSECT STING, Disp: , Rfl:   •  Flaxseed, Linseed, (Flaxseed Oil) 1000 MG CAPS, Take as directed, Disp: , Rfl:   •  Glucosamine 750 MG TABS, Take 300 mg by mouth daily  , Disp: , Rfl:   •  losartan (COZAAR) 100 MG tablet, Take 100 mg by mouth daily , Disp: , Rfl:   •  meloxicam (MOBIC) 7.5 mg tablet, Take 7.5 mg by mouth, Disp: , Rfl:   •  metoprolol succinate (TOPROL-XL) 25 mg 24 hr tablet, , Disp: , Rfl:   •  Multiple Vitamins-Minerals (MULTI COMPLETE) CAPS, Take by mouth daily  , Disp: , Rfl:   •  Omega-3 Fatty Acids ([employer] FISH OIL) 1000 MG CAPS, Take by mouth daily  , Disp: , Rfl:   •  TURMERIC CURCUMIN PO, by Does not apply route daily  , Disp: , Rfl:     Review of Systems:  Review of Systems   Constitutional: Negative for chills and fever. Respiratory: Negative for shortness of breath. Cardiovascular: Negative for chest pain. Gastrointestinal: Positive for abdominal distention (sometimes). Negative for abdominal pain, blood in stool, constipation, nausea and vomiting. Genitourinary: Positive for dyspareunia and vaginal pain. Negative for difficulty urinating, dysuria, frequency, pelvic pain, urgency, vaginal bleeding and vaginal discharge. Neurological: Negative for headaches. Physical Exam:  /74   Ht 5' 3.75" (1.619 m)   Wt 61.2 kg (135 lb)   BMI 23.35 kg/m²    Physical Exam  Constitutional:       General: She is awake. Appearance: Normal appearance. She is well-developed. Genitourinary:      Vulva and bladder normal.      Right Labia: No rash, tenderness or lesions. Left Labia: No tenderness, lesions or rash. Vaginal cuff intact. No vaginal discharge, erythema, tenderness or bleeding. No vaginal prolapse present. Moderate vaginal atrophy present. Right Adnexa: not tender, not full and no mass present. Left Adnexa: not tender, not full and no mass present. Cervix is absent. Uterus is absent. No urethral prolapse present. Bladder is not tender. Pelvic exam was performed with patient in the lithotomy position. Breasts:     Right: Normal. No inverted nipple, mass, nipple discharge, skin change or tenderness. Left: Normal. No inverted nipple, mass, nipple discharge, skin change or tenderness. HENT:      Head: Normocephalic and atraumatic. Cardiovascular:      Rate and Rhythm: Normal rate and regular rhythm. Heart sounds: Normal heart sounds. Pulmonary:      Effort: Pulmonary effort is normal. No tachypnea or respiratory distress. Breath sounds: Normal breath sounds. Abdominal:      General: There is no distension. Palpations: Abdomen is soft. Tenderness: There is no abdominal tenderness.  There is no guarding. Musculoskeletal:      Cervical back: Neck supple. Lymphadenopathy:      Upper Body:      Right upper body: No supraclavicular or axillary adenopathy. Left upper body: No supraclavicular or axillary adenopathy. Neurological:      General: No focal deficit present. Mental Status: She is alert and oriented to person, place, and time. Psychiatric:         Mood and Affect: Mood normal.         Behavior: Behavior normal.         Thought Content: Thought content normal.         Judgment: Judgment normal.   Vitals reviewed.

## 2023-08-29 NOTE — PATIENT INSTRUCTIONS
VAGINAL DRYNESS OVERVIEW    Vaginal dryness is a common condition in postmenopausal women (ie, women who have been through menopause). This condition is also common in women who have had both of their ovaries surgically removed, for example, to treat or prevent cancer. You may hear your health care provider use the term "atrophic vaginitis" or "genitourinary syndrome of menopause."    In some women, vaginal dryness leads to uncomfortable symptoms, such as pain with sex, burning vaginal discomfort or itching, or abnormal vaginal discharge. There may also be related urinary symptoms, such as frequent or painful urination. Other women do not notice bothersome symptoms at all. Fortunately, there are several effective treatments available. VAGINAL DRYNESS CAUSES    The hormone estrogen helps to keep the vagina moist, maintain thickness of the vaginal lining, and keep the tissue flexible (also called "elasticity"). Vaginal dryness occurs when your body produces a decreased amount of estrogen. This can be permanent or temporary and can occur at different times throughout life, such as:    ?At the time of menopause (one year after monthly periods stop). ? After surgical removal of the ovaries, chemotherapy, or radiation therapy of the pelvis to treat cancer. ? After having a baby in women who are breastfeeding - Estrogen production returns to normal when breastfeeding becomes less frequent or is stopped. ? While using certain medications, such as danazol, medroxyprogesterone (brand names: Provera, Depo-Provera), leuprolide (brand name: Lupron), or nafarelin - When these medications are stopped, estrogen production usually returns to normal.    VAGINAL DRYNESS TREATMENT    There are several treatment options for women with vaginal dryness. Some, such as vaginal moisturizers or lubricants, are available without a prescription.  Others require a prescription, including a vaginal estrogen cream, tablet, capsule, or ring; an oral medication called ospemifene; and a vaginal tablet called prasterone. These treatments usually work temporarily; your symptoms will return when the treatment is stopped unless your ovaries start producing more estrogen again. Vaginal lubricants and moisturizers -- You can buy these without a prescription in most pharmacies. Vaginal lubricants and moisturizers do not contain any hormones and have virtually no systemic (body-wide) side effects. Possible local side effects include irritation or a burning feeling after application. ?Lubricants are designed to reduce friction and discomfort from dryness during sexual intercourse. The lubricant is applied inside the vagina and/or on the partner's penis or fingers just before sex. Products sold specifically as vaginal lubricants are more effective than lubricants that are not designed for this purpose, such as petroleum jelly. In addition, oil-based lubricants like petroleum jelly, baby oil, or mineral oil can damage latex condoms and/or diaphragms and make them less effective in preventing pregnancy or sexually transmitted infections. Lubricants that are made with water or silicones can be used with latex condoms and diaphragms. Polyurethane condoms can be used with oil-based products. Natural lubricants, such as olive, coconut, avocado, or peanut oil, are easily available products that may be used as a lubricant with sex. However, it is important to know that, like the oil-based lubricants, natural oils are not recommended for use with latex condoms or diaphragms, as they can damage the latex. Water- or silicone-based lubricants are a better choice if you use condoms or a diaphragm. ? Vaginal moisturizers are formulated to allow the vaginal tissues to retain moisture more effectively. Moisturizers are applied into the vagina approximately three times weekly to allow a continuous moisturizing effect.  Be sure to check the label to ensure that you are purchasing a moisturizer and not a lubricant. Hand and body lotions and moisturizers should not be used to relieve vaginal dryness since they can be irritating to the vaginal tissues. Vaginal estrogen -- Vaginal estrogen is one of the most effective treatment options for vaginal dryness. Vaginal estrogen requires a prescription from your health care provider. Some women worry about possible side effects from hormones. Very low doses of estrogen can be used to treat vaginal dryness when it is in the form of a vaginal cream or insertable tablet, capsule, or ring. A small amount of estrogen is absorbed into the bloodstream with these vaginal forms, but when used regularly, the level of estrogen in the blood is in the same range as in postmenopausal women who are not using vaginal estrogen. As a result, there is a much lower risk of the side effects people sometimes worry about, such as blood clots, breast cancer, and heart attack, compared with other estrogen-containing products (eg, birth control pills, menopausal hormone therapy). Several types of vaginal estrogen products are available:    ? Cream - Estrogen cream is measured with an applicator and inserted into the vagina (if inserting the applicator is uncomfortable, you can also use your finger). The cream is usually inserted every day for two weeks and then one or two times weekly after that. With some doses, you may also need to a take another hormonal medication, called a progestin. This is to prevent the lining of your uterus from building up and becoming precancerous or cancerous. Ask your health care provider whether you need to take a progestin with the vaginal estrogen cream you are using. ?Tablet or capsule - The vaginal estrogen insert is a small tablet or capsule that you put in your vagina. It comes packaged in a disposable applicator. It is usually inserted every day for two weeks and then twice weekly after that.  Some women find it uncomfortable to insert the applicator at first, when vaginal dryness is at its worst; if you have this problem, ask your health care provider if you should use another form of vaginal estrogen. ? Ring - The vaginal estrogen ring (brand name: Estring) is a flexible plastic ring you wear inside your vagina all the time. You replace it with a new ring every three months. The ring does not need to be removed during sex or bathing. It cannot be felt by most women or their sexual partners. In women who have previously had a hysterectomy, the ring will sometimes fall out. The estrogen ring used to treat vaginal dryness should not be confused with a different estrogen replacement vaginal ring (brand name: Suezanne Pintos), which releases a much higher dose of estrogen. The estrogen in the higher dose ring is intended to be absorbed into the body to relieve hot flashes in women going through menopause. How long can I use vaginal estrogen? -- As low-dose vaginal estrogen is associated with few adverse effects, it can probably be used indefinitely, although there are no long-term studies to provide data about the effects over many years. Is vaginal estrogen safe if I have a history of breast cancer? -- The safety of vaginal estrogen in women who have a past history of breast cancer is debated. A small amount of estrogen can be absorbed from the vagina into the bloodstream. If you have a history of breast cancer, talk to your health care provider or oncologist about the potential risks and benefits of vaginal estrogen. Prasterone (dehydroepiandrosterone) -- Prasterone, also known as dehydroepiandrosterone (DHEA), is also an option for women with vaginal dryness due to menopause. It comes in the form of a suppository that you insert into your vagina once a day.  Vaginal estrogen therapy is more commonly used than prasterone because vaginal estrogen has been studied more thoroughly and the dosing schedule may be more convenient. However, prasterone is an option for women who cannot take estrogen or prefer to avoid it, but who can use other vaginal hormones. Ospemifene -- Ospemifene is a prescription medication that is similar to estrogen but is not estrogen. In the vaginal tissue, it acts similarly to estrogen. It comes in a pill and is prescribed for women who want to use an estrogen-like medication for vaginal dryness but prefer not to use (or have trouble inserting) a vaginal medication. Ospemifene may cause hot flashes as a side effect; it may also increase the risk of blood clots or uterine cancer. Long-term studies of ospemifene are needed to evaluate the risk of these complications. This medication has not been tested in women who have had breast cancer or are at a high risk of developing breast cancer. Sexual activity -- Vaginal estrogen improves dryness quickly, usually within a few weeks. If sex is not uncomfortable, you may continue to have sex as you treat vaginal dryness. Teachey may help the vaginal tissues by keeping them soft and stretchable. If sex continues to be painful despite treatment for vaginal dryness, talk to your health care provider. There may be other things you can try such as pelvic floor physical therapy.      [email or web address]

## 2023-08-30 ENCOUNTER — TELEPHONE (OUTPATIENT)
Dept: HEMATOLOGY ONCOLOGY | Facility: CLINIC | Age: 68
End: 2023-08-30

## 2023-08-30 NOTE — TELEPHONE ENCOUNTER
I called Rafael Lucas in response to a referral that was received for patient to establish care with Surgical Oncology. Outreach was made to schedule a consultation. I left a voicemail explaining the reason for my call and advised patient to call \[facility]\"" at [phone]. Another attempt will be made to contact patient.

## 2023-09-05 ENCOUNTER — TELEPHONE (OUTPATIENT)
Dept: HEMATOLOGY ONCOLOGY | Facility: CLINIC | Age: 68
End: 2023-09-05

## 2023-09-05 NOTE — TELEPHONE ENCOUNTER
I called Reji Chong in response to a referral that was received for patient to establish care with Surgical Oncology. Outreach was made to schedule a consultation. I left a voicemail explaining the reason for my call and advised patient to call [facility] at [phone]. Another attempt will be made to contact patient.

## 2023-09-14 ENCOUNTER — TELEPHONE (OUTPATIENT)
Dept: HEMATOLOGY ONCOLOGY | Facility: CLINIC | Age: 68
End: 2023-09-14

## 2023-09-14 DIAGNOSIS — C50.911 MALIGNANT NEOPLASM OF RIGHT BREAST IN FEMALE, ESTROGEN RECEPTOR NEGATIVE, UNSPECIFIED SITE OF BREAST: Primary | ICD-10-CM

## 2023-09-14 DIAGNOSIS — R97.8 ABNORMAL TUMOR MARKERS: ICD-10-CM

## 2023-09-14 DIAGNOSIS — Z17.1 MALIGNANT NEOPLASM OF RIGHT BREAST IN FEMALE, ESTROGEN RECEPTOR NEGATIVE, UNSPECIFIED SITE OF BREAST: Primary | ICD-10-CM

## 2023-09-14 NOTE — TELEPHONE ENCOUNTER
Spoke with patient reminding to have labs drawn, patient is only able to have labs drawn every since 6 months due to insurance, patient requested to reschedule appointment to November so she is able to have all labs drawn together, patient is rescheduled to 11/21 at 0920 AM with Dr. Parvez Chew in the [facility] office. Ordered new script for blood work due to current copy expiring at the end of September. Patient goes to the [facility] in [place] ([place]) on [facility], will send fax over to [facility] and send copy of labs to patient to mailing address. Patient thanked me for the call.

## 2023-09-18 ENCOUNTER — TELEPHONE (OUTPATIENT)
Age: 68
End: 2023-09-18

## 2023-09-18 NOTE — TELEPHONE ENCOUNTER
Pt called stated that she is going for knee surgery and she will be given tylenol to take 3 times a day and asking if that is ok since she has cyst on the kidney.     Please review

## 2023-09-18 NOTE — TELEPHONE ENCOUNTER
Called patient - she is told that she can take Tylenol without a problem. She had no further concerns at this time.

## 2023-11-16 ENCOUNTER — TELEPHONE (OUTPATIENT)
Dept: HEMATOLOGY ONCOLOGY | Facility: CLINIC | Age: 68
End: 2023-11-16

## 2023-11-16 ENCOUNTER — TELEPHONE (OUTPATIENT)
Dept: SURGICAL ONCOLOGY | Facility: CLINIC | Age: 68
End: 2023-11-16

## 2023-11-16 NOTE — TELEPHONE ENCOUNTER
Chief Complaint   Patient presents with    Urge Incontinence   PVR-  Rocio Valencia LPN       Patient Call    Who are you speaking with? Patient    If it is not the patient, are they listed on an active communication consent form? Yes   What is the reason for this call? Patient wants to know what Ultrasound she has to get done. Does this require a call back? Yes   If a call back is required, please list best call back number [phone]   If a call back is required, advise that a message will be forwarded to their care team and someone will return their call as soon as possible. Did you relay this information to the patient?  Yes

## 2023-11-16 NOTE — TELEPHONE ENCOUNTER
Appointment Change  Cancel, Reschedule, Change to Virtual      Who are you speaking with? Patient   If it is not the patient, is the caller listed on the communication consent form? Yes   Which provider is the appointment scheduled with? Dr. Doug Valdez   When was the original appointment scheduled? Please list date and time 11/21/23   At which location is the appointment scheduled to take place? [place]   Was the appointment rescheduled? Was the appointment changed from an in person visit to a virtual visit? If so, please list the details of the change. 1/30/24   What is the reason for the appointment change? Patient appt conflict       Was [facility] transport scheduled? No   Does [facility] transport need to be scheduled for the new visit (if applicable) No   Does the patient need an infusion appointment rescheduled? No   Does the patient have an upcoming infusion appointment scheduled? If so, when? No   Is the patient undergoing chemotherapy? No   For appointments cancelled with less than 24 hours:  Was the no-show policy reviewed?  Yes

## 2024-01-02 ENCOUNTER — TELEPHONE (OUTPATIENT)
Dept: HEMATOLOGY ONCOLOGY | Facility: CLINIC | Age: 69
End: 2024-01-02

## 2024-01-02 NOTE — TELEPHONE ENCOUNTER
Lab Inquiry   Who are you speaking with? Patient     If it is not the patient, are they listed on an active communication consent form? N/A   Name of ordering provider Dr. Vieira   What is being requested? Patient requesting labs to be faxed to [facility] at fax number n/a   Lab draw [facility] (\[facility] [place] [facility]\"")   What is the best call back number? [phone]

## 2024-01-08 ENCOUNTER — HOSPITAL ENCOUNTER (OUTPATIENT)
Dept: MRI IMAGING | Facility: HOSPITAL | Age: 69
Discharge: HOME | End: 2024-01-08
Payer: MEDICARE

## 2024-01-08 DIAGNOSIS — D18.02: ICD-10-CM

## 2024-01-08 PROCEDURE — 70553 MRI BRAIN STEM W/O & W/DYE: CPT

## 2024-01-08 PROCEDURE — G1004 CDSM NDSC: HCPCS

## 2024-01-08 PROCEDURE — A9585 GADOBUTROL INJECTION: HCPCS | Performed by: INTERNAL MEDICINE

## 2024-01-08 RX ORDER — GADOBUTROL 604.72 MG/ML
6 INJECTION INTRAVENOUS
Status: COMPLETED | OUTPATIENT
Start: 2024-01-08 | End: 2024-01-08

## 2024-01-08 RX ADMIN — GADOBUTROL 6 ML: 604.72 INJECTION INTRAVENOUS at 09:31

## 2024-01-10 ENCOUNTER — VBI (OUTPATIENT)
Dept: ADMINISTRATIVE | Facility: OTHER | Age: 69
End: 2024-01-10

## 2024-01-10 NOTE — TELEPHONE ENCOUNTER
Upon review of the In Basket request we were able to locate, review, and update the patient chart as requested for Mammogram.    Any additional questions or concerns should be emailed to the Practice Liaisons via the appropriate education email address, please do not reply via In Basket.    Thank you  ZHANNA KAUR

## 2024-01-15 ENCOUNTER — CONSULT (OUTPATIENT)
Dept: SURGICAL ONCOLOGY | Facility: CLINIC | Age: 69
End: 2024-01-15
Payer: MEDICARE

## 2024-01-15 VITALS
SYSTOLIC BLOOD PRESSURE: 122 MMHG | HEART RATE: 91 BPM | DIASTOLIC BLOOD PRESSURE: 78 MMHG | TEMPERATURE: 96.9 F | BODY MASS INDEX: 23.32 KG/M2 | WEIGHT: 136.6 LBS | OXYGEN SATURATION: 95 % | HEIGHT: 64 IN

## 2024-01-15 DIAGNOSIS — Z85.3 ENCOUNTER FOR FOLLOW-UP SURVEILLANCE OF BREAST CANCER: Primary | ICD-10-CM

## 2024-01-15 DIAGNOSIS — Z12.31 VISIT FOR SCREENING MAMMOGRAM: ICD-10-CM

## 2024-01-15 DIAGNOSIS — C50.911 MALIGNANT NEOPLASM OF RIGHT BREAST IN FEMALE, ESTROGEN RECEPTOR NEGATIVE, UNSPECIFIED SITE OF BREAST: ICD-10-CM

## 2024-01-15 DIAGNOSIS — Z17.1 MALIGNANT NEOPLASM OF RIGHT BREAST IN FEMALE, ESTROGEN RECEPTOR NEGATIVE, UNSPECIFIED SITE OF BREAST: ICD-10-CM

## 2024-01-15 DIAGNOSIS — Z08 ENCOUNTER FOR FOLLOW-UP SURVEILLANCE OF BREAST CANCER: Primary | ICD-10-CM

## 2024-01-15 DIAGNOSIS — Z85.3 HISTORY OF BREAST CANCER: ICD-10-CM

## 2024-01-15 PROCEDURE — 99204 OFFICE O/P NEW MOD 45 MIN: CPT | Performed by: NURSE PRACTITIONER

## 2024-01-15 RX ORDER — PSEUDOEPHED/ACETAMINOPH/DIPHEN 30MG-500MG
TABLET ORAL
COMMUNITY
Start: 2023-10-30

## 2024-01-15 RX ORDER — CELECOXIB 200 MG/1
CAPSULE ORAL
COMMUNITY
Start: 2023-10-30

## 2024-01-15 RX ORDER — BENZONATATE 100 MG/1
CAPSULE ORAL
COMMUNITY
Start: 2024-01-12

## 2024-01-15 RX ORDER — CEFDINIR 300 MG/1
300 CAPSULE ORAL 2 TIMES DAILY
COMMUNITY
Start: 2024-01-12

## 2024-01-15 NOTE — PROGRESS NOTES
Surgical Oncology Follow Up       [address]  CANCER [facility] SURGICAL ONCOLOGY [place]  [address]  [facility] [id number]    Clover Martin  1955  [id number]  [address]  [facility] SURGICAL ONCOLOGY [place]  [address]  [facility] [address]    Chief Complaint   Patient presents with    Consult       Assessment/Plan:  1. Malignant neoplasm of right breast in female, estrogen receptor negative, unspecified site of breast     2. Encounter for follow-up surveillance of breast cancer    3. History of breast cancer  - 1 year f/u visit    4. Visit for screening mammogram  - Mammo screening left w 3d & cad; Future      Discussion/Summary: Patient is a 68-year-old female that was diagnosed with a right-sided breast cancer in September 2006.  Her pathology revealed DCIS with microinvasion, ER/[place] negative.  She underwent a right mastectomy with Dr. Alaniz and had reconstruction with Dr. Sebastian.  She did not receive adjuvant therapy.  She continues to follow with medical oncology on a routine basis.  She had a left-sided mammogram performed earlier this month which was benign.  She reports that she underwent genetic testing several years ago which was benign.  I have asked her to forward me a copy of these results.  We discussed the possibility of updating her genetic testing if indicated.  There are no worrisome findings on her clinical exam today and patient offers no new complaints today.  Prescription given for next year's mammogram.  I will plan to see her back in 1 year or sooner should the need arise.  She was instructed to contact us with any changes or concerns in the interim.  All of her questions were answered today.    History of Present Illness:       -Interval History: Patient is a 68-year-old female that presents to the office today to establish/transfer her care.  She was previously followed at [facility].  She was diagnosed with a right-sided breast  cancer in September of 2006.  Her pathology revealed DCIS with microinvasion, ER/MT negative, HER2 positive.  She underwent a right mastectomy with Dr. Drake Alaniz. She underwent implant reconstruction with Dr. Sebastian.  She did not receive adjuvant therapy.  She had a left-sided mammogram in January 2024 which was BI-RADS 1, category 2 density.  She notices no changes on her self breast exam.  She denies persistent headaches, back pain or bone pain, cough or shortness of breath, abdominal pain.  She reports that she underwent genetic testing which was negative.  She also states that her sister underwent genetic testing which was negative and she believes that her daughter did as well.  Her daughter was recently diagnosed with an osteosarcoma at the age of 38.  She was treated at [facility].  Patient's father had prostate cancer at the age of 82.  She is not of Ashkenazi [place] descent.  Menarche age 13, 3 pregnancies, 3 live births.  She was 29 at the time her first child was born.  She is postmenopausal as of age 51.  She is retired.    Review of Systems:  Review of Systems   Constitutional:  Negative for activity change, appetite change, chills, fatigue, fever and unexpected weight change.   Respiratory:  Negative for cough and shortness of breath.    Cardiovascular:  Negative for chest pain.   Gastrointestinal:  Negative for abdominal pain, constipation, diarrhea, nausea and vomiting.   Musculoskeletal:  Negative for arthralgias, back pain, gait problem and myalgias.   Skin:  Negative for color change and rash.   Neurological:  Negative for dizziness and headaches.   Hematological:  Negative for adenopathy.   Psychiatric/Behavioral:  Negative for agitation and confusion.    All other systems reviewed and are negative.      Patient Active Problem List   Diagnosis    History of breast cancer    Abnormal tumor markers    Thyroid nodule    Lung nodules    Multiple renal cysts    Vitamin D deficiency     "Multinodular non-toxic goiter    Chronic lymphocytic thyroiditis    History of ductal carcinoma in situ (DCIS) of breast    Effusion of left knee    Encounter for follow-up surveillance of breast cancer     Past Medical History:   Diagnosis Date    Arthritis     Bowel incontinence     mild    Bunion of great toe of right foot     Colon polyp     Dental crown present     Environmental allergies     ragweed    Exercises 5 to 6 times per week     treadmill    GERD (gastroesophageal reflux disease)     History of kidney stones     History of Lyme disease     History of palpitations     Hypertension     Kidney cysts     \"bilat to be followed in 6 months with CT Scan\"    Left arm pain     \"occas related to lifting weights recently\"    Limb alert care status     RIGHT ;no BP/IV's    Lung nodule     Malignant neoplasm of upper-outer quadrant of right female breast (HCC)     right    Motion sickness     Thyroid disorder     nodules/enlarged    Tooth missing     Wears glasses      Past Surgical History:   Procedure Laterality Date    ABDOMINOPLASTY      BREAST SURGERY Right     CA     SECTION      COLONOSCOPY      ESOPHAGOGASTRODUODENOSCOPY      HYSTERECTOMY      KNEE ARTHROSCOPY Left     MASTECTOMY Right     DE HALLUX RIGIDUS W/CHEILECTOMY 1ST MP JT W/IMPLT Right 2018    Procedure: CHEILECTOMY HALLUX RIGIDUS; GREAT TOE CARTIVA IMPLANT;  Surgeon: Michael Corral DPM;  Location: [facility];  Service: Podiatry    US GUIDED THYROID BIOPSY  2020     Family History   Problem Relation Age of Onset    No Known Problems Mother     Prostate cancer Father     Hypertension Father     Stroke Father     No Known Problems Sister     No Known Problems Daughter     No Known Problems Sister     No Known Problems Sister     No Known Problems Daughter     No Known Problems Daughter      Social History     Socioeconomic History    Marital status: /Civil Union     Spouse name: Not on file    Number of children: Not on " file    Years of education: Not on file    Highest education level: Not on file   Occupational History    Not on file   Tobacco Use    Smoking status: Never    Smokeless tobacco: Never   Vaping Use    Vaping status: Never Used   Substance and Sexual Activity    Alcohol use: No    Drug use: No    Sexual activity: Yes     Partners: Male     Birth control/protection: Post-menopausal   Other Topics Concern    Not on file   Social History Narrative    Not on file     Social Determinants of Health     Financial Resource Strain: Not on file   Food Insecurity: Not on file   Transportation Needs: Not on file   Physical Activity: Not on file   Stress: Not on file   Social Connections: Not on file   Intimate Partner Violence: Not on file   Housing Stability: Not on file       Current Outpatient Medications:     Acetaminophen Extra Strength 500 MG TABS, TAKE 2 TABLETS (1,000 MG TOTAL) BY MOUTH EVERY 8 HOURS FOR 10 DAYS, Disp: , Rfl:     Ascorbic Acid 500 MG CAPS, Take 500 mg by mouth daily, Disp: , Rfl:     aspirin 81 MG tablet, Take 81 mg by mouth daily  , Disp: , Rfl:     benzonatate (TESSALON PERLES) 100 mg capsule, TAKE AS DIRECTED 1 CAPSULE EVERY 8 HOURS AS NEEDED, Disp: , Rfl:     cefdinir (OMNICEF) 300 mg capsule, Take 300 mg by mouth 2 (two) times a day, Disp: , Rfl:     celecoxib (CeleBREX) 200 mg capsule, TAKE 1 CAPSULE (200 MG TOTAL) BY MOUTH 2 (TWO) TIMES A DAY FOR 14 DAYS., Disp: , Rfl:     Cholecalciferol 1000 units capsule, Take 1,000 Units by mouth daily  , Disp: , Rfl:     Coenzyme Q10 10 MG capsule, Once daily, Disp: , Rfl:     EPINEPHrine (EPIPEN) 0.3 mg/0.3 mL SOAJ, AS DIRECTED- INJECT AT INSECT STING, Disp: , Rfl:     Flaxseed, Linseed, (Flaxseed Oil) 1000 MG CAPS, Take as directed, Disp: , Rfl:     Glucosamine 750 MG TABS, Take 300 mg by mouth daily  , Disp: , Rfl:     losartan (COZAAR) 100 MG tablet, Take 100 mg by mouth daily , Disp: , Rfl:     meloxicam (MOBIC) 7.5 mg tablet, Take 7.5 mg by mouth, Disp:  , Rfl:     metoprolol succinate (TOPROL-XL) 25 mg 24 hr tablet, , Disp: , Rfl:     Multiple Vitamins-Minerals (MULTI COMPLETE) CAPS, Take by mouth daily  , Disp: , Rfl:     Omega-3 Fatty Acids ([employer] FISH OIL) 1000 MG CAPS, Take by mouth daily  , Disp: , Rfl:     TURMERIC CURCUMIN PO, by Does not apply route daily  , Disp: , Rfl:   Allergies   Allergen Reactions    Pollen Extract Allergic Rhinitis     Vitals:    01/15/24 1312   Temp: (!) 96.9 °F (36.1 °C)       Physical Exam  Vitals reviewed.   Constitutional:       General: She is not in acute distress.     Appearance: Normal appearance. She is well-developed. She is not diaphoretic.   HENT:      Head: Normocephalic and atraumatic.   Cardiovascular:      Rate and Rhythm: Normal rate and regular rhythm.      Heart sounds: Normal heart sounds.   Pulmonary:      Effort: Pulmonary effort is normal.      Breath sounds: Normal breath sounds.   Chest:   Breasts:     Left: No swelling, bleeding, inverted nipple, mass, nipple discharge, skin change or tenderness.      Comments: Right reconstructed breast with implant present. No masses, skin changes or nodules  Abdominal:      Palpations: Abdomen is soft. There is no mass.      Tenderness: There is no abdominal tenderness.   Musculoskeletal:         General: Normal range of motion.      Cervical back: Normal range of motion.   Lymphadenopathy:      Upper Body:      Right upper body: No supraclavicular or axillary adenopathy.      Left upper body: No supraclavicular or axillary adenopathy.   Skin:     General: Skin is warm and dry.      Findings: No rash.   Neurological:      Mental Status: She is alert and oriented to person, place, and time.   Psychiatric:         Speech: Speech normal.           Results:    Imaging  MAMMO 3D TOMOSYNTHESIS SCREENING LT W/CAD    Result Date: 1/12/2024  Narrative: This result has an attachment that is not available. HISTORY: Patient is 68 years old and is seen for a screening mammogram of the  left breast. Medical history includes breast cancer, BRCA1 Negative, and BRCA2 Negative. Surgical history includes breast reduction, mastectomy, and hysterectomy. No relevant family history has been documented for this patient. No relevant hormone history has been documented for this patient. FILMS COMPARED: The present examination has been compared to prior imaging studies. MAMMOGRAM FINDINGS: A minimum of two views were obtained. 3D tomosynthesis was performed. Computer-aided detection was utilized by the radiologist in the interpretation of this examination. The left breast has scattered areas of fibroglandular density. No suspicious masses, calcifications or other abnormalities are seen.    Impression: Impression: There is no mammographic evidence of malignancy. BI-RADS Category:  1 - Negative Follow Up Mamm in 1 Yr. is recommended. No breast cancer risk score was generated for this exam. In patients with a documented history of breast cancer a risk score will not be calculated. Based on the information provided by the patient, risk scores for breast cancer for 5 years, 10 years and lifetime was calculated using both breast density and family history.  Patients should consult   with their referring providers regarding the significance of the score, potential need for additional risk assessment and supplemental screening including whole breast ultrasound and MRI Workstation: [id number]    MRI brain w wo contrast    Result Date: 1/12/2024  Narrative: MRI BRAIN WITH AND WITHOUT CONTRAST INDICATION: D18.02: Hemangioma of intracranial structures. COMPARISON: MRI brain 8/27/2019. TECHNIQUE: Multiplanar, multisequence imaging of the brain was performed before and after gadolinium administration. IV Contrast:  6 mL of Gadobutrol injection (SINGLE-DOSE) IMAGE QUALITY:   Diagnostic. FINDINGS: BRAIN PARENCHYMA AND ADJACENT EXTRA-AXIAL SPACES: Again noted approximately 1 cm focus of altered signal medial inferior left  cerebellar hemisphere, imaging characteristics most consistent with uncomplicated cavernous malformation; similar to the 2019 MRI. There are no other areas of abnormal signal intensity or pathologic contrast enhancement evident within brain parenchyma.  There is no suspicious expansile mass evident. VENTRICLES: Normal size for age. SELLA AND PITUITARY GLAND: No significant abnormality evident. ORBITS: There is no significant abnormality evident. PARANASAL SINUSES AND TEMPORAL BONES: Mild to moderate mucosal thickening involving sphenoid, right maxillary, and bilateral anterior and posterior ethmoidal sinuses with hypoplastic frontal sinuses, worsened since the prior. Small volume of fluid within  mastoid and petrous temporal bone air cells on the right new since the prior. Middle ear well aerated bilaterally. The paranasal sinuses and temporal bones otherwise appear to be well aerated. There is no middle or inner ear abnormality evident. VASCULATURE:  No significant vascular abnormality evident. CALVARIUM, SKULL BASE, AND UPPER CERVICAL SPINE:  No significant abnormality evident. EXTRACRANIAL SOFT TISSUES:  No significant abnormality evident.     Impression: Again noted 1 cm left cerebellar hemisphere lesion, imaging characteristics most consistent with uncomplicated cavernous malformation; unchanged compared to 2019 MRI. Worsening sinusitis. Temporal bone fluid on the right noted above is a nonspecific finding most often bland effusion; less likely infectious/inflammatory in nature. This finding new since the prior. Arpan العراقي M.D., FACR Senior Member, American Society of Neurorad[employer] Workstation performed: [id number]       I reviewed the above imaging data.      Advance Care Planning/Advance Directives:  Discussed disease status, cancer treatment plans and/or cancer treatment goals with the patient.

## 2024-01-23 ENCOUNTER — APPOINTMENT (OUTPATIENT)
Dept: LAB | Facility: AMBULARY SURGERY CENTER | Age: 69
End: 2024-01-23
Payer: MEDICARE

## 2024-01-23 DIAGNOSIS — C50.911 MALIGNANT NEOPLASM OF RIGHT BREAST IN FEMALE, ESTROGEN RECEPTOR NEGATIVE, UNSPECIFIED SITE OF BREAST: ICD-10-CM

## 2024-01-23 DIAGNOSIS — R97.8 ABNORMAL TUMOR MARKERS: ICD-10-CM

## 2024-01-23 DIAGNOSIS — Z17.1 MALIGNANT NEOPLASM OF RIGHT BREAST IN FEMALE, ESTROGEN RECEPTOR NEGATIVE, UNSPECIFIED SITE OF BREAST: ICD-10-CM

## 2024-01-30 ENCOUNTER — OFFICE VISIT (OUTPATIENT)
Dept: HEMATOLOGY ONCOLOGY | Facility: CLINIC | Age: 69
End: 2024-01-30
Payer: MEDICARE

## 2024-01-30 VITALS
BODY MASS INDEX: 23.22 KG/M2 | HEART RATE: 86 BPM | DIASTOLIC BLOOD PRESSURE: 70 MMHG | SYSTOLIC BLOOD PRESSURE: 132 MMHG | OXYGEN SATURATION: 100 % | WEIGHT: 136 LBS | HEIGHT: 64 IN | TEMPERATURE: 97.2 F | RESPIRATION RATE: 16 BRPM

## 2024-01-30 DIAGNOSIS — C50.911 MALIGNANT NEOPLASM OF RIGHT BREAST IN FEMALE, ESTROGEN RECEPTOR NEGATIVE, UNSPECIFIED SITE OF BREAST: Primary | ICD-10-CM

## 2024-01-30 DIAGNOSIS — Z17.1 MALIGNANT NEOPLASM OF RIGHT BREAST IN FEMALE, ESTROGEN RECEPTOR NEGATIVE, UNSPECIFIED SITE OF BREAST: Primary | ICD-10-CM

## 2024-01-30 DIAGNOSIS — E04.1 THYROID NODULE: ICD-10-CM

## 2024-01-30 PROCEDURE — 99214 OFFICE O/P EST MOD 30 MIN: CPT | Performed by: INTERNAL MEDICINE

## 2024-01-30 NOTE — PROGRESS NOTES
Medical Oncology Office Follow-up Note    HPI:     Clover Martin is a 68 year old female with history of right breast hormone receptor negative DCIS with microinvasion in 2006. Patient underwent right breast mastectomy and left breast reduction surgery. She did not require adjuvant therapy. Patient has been running slightly high tumor marker CA 27-29 and highest was 60. There haven't been any lesions concerning for recurrent or metastatic breast cancer.     Patient also has stable thyroid nodules, lung nodules and renal cysts and they are being monitored.  Lung nodules are between 2 mm and 4 mm and are stable for more than 5 years . Same is for thyroid nodules. She has left ovarian cyst and she follows with her gynecologist.  For examination of breast and imaging studies she follows with her breast surgeon.  She goes to her urologist for renal cysts.      Interval History:     Patient presented to the office today for routine follow-up for her history of right breast DCIS.  Status post right total knee replacement on 10/30/2023. She has recovered well from the surgery and is ambulating fine without requiring any assist devices.     Patient's most recent screening mammogram from Jan 2024 was negative for any mammographic evidence of malignancy. CA 27.29 has been stable.       Current Outpatient Medications:     Ascorbic Acid 500 MG CAPS, Take 500 mg by mouth daily, Disp: , Rfl:     aspirin 81 MG tablet, Take 81 mg by mouth daily  , Disp: , Rfl:     Cholecalciferol 1000 units capsule, Take 1,000 Units by mouth daily  , Disp: , Rfl:     Coenzyme Q10 10 MG capsule, Once daily, Disp: , Rfl:     EPINEPHrine (EPIPEN) 0.3 mg/0.3 mL SOAJ, AS DIRECTED- INJECT AT INSECT STING, Disp: , Rfl:     Flaxseed, Linseed, (Flaxseed Oil) 1000 MG CAPS, Take as directed, Disp: , Rfl:     Glucosamine 750 MG TABS, Take 300 mg by mouth daily  , Disp: , Rfl:     losartan (COZAAR) 100 MG tablet, Take 100 mg by mouth daily , Disp: ,  "Rfl:     metoprolol succinate (TOPROL-XL) 25 mg 24 hr tablet, , Disp: , Rfl:     Multiple Vitamins-Minerals (MULTI COMPLETE) CAPS, Take by mouth daily  , Disp: , Rfl:     Omega-3 Fatty Acids ([employer] FISH OIL) 1000 MG CAPS, Take by mouth daily  , Disp: , Rfl:     TURMERIC CURCUMIN PO, by Does not apply route daily  , Disp: , Rfl:     Allergies   Allergen Reactions    Pollen Extract Allergic Rhinitis         Review of Systems   Constitutional:  Negative for appetite change, chills, fatigue, fever and unexpected weight change.   HENT:   Negative for hearing loss, mouth sores, nosebleeds, sore throat, trouble swallowing and voice change.    Eyes:  Negative for eye problems and icterus.   Respiratory:  Negative for chest tightness, cough, shortness of breath and wheezing.    Cardiovascular:  Negative for chest pain and palpitations.   Gastrointestinal:  Negative for abdominal pain, blood in stool, constipation, diarrhea, nausea and vomiting.   Endocrine: Negative for hot flashes.   Genitourinary:  Negative for dyspareunia, dysuria, frequency and hematuria.    Musculoskeletal:  Negative for back pain, flank pain, gait problem, myalgias and neck pain.   Skin:  Negative for itching and rash.   Neurological:  Negative for dizziness, gait problem, headaches, light-headedness and speech difficulty.   Hematological:  Negative for adenopathy. Does not bruise/bleed easily.   Psychiatric/Behavioral:  Negative for confusion. The patient is not nervous/anxious.      /70 (BP Location: Left arm, Patient Position: Sitting, Cuff Size: Adult)   Pulse 86   Temp (!) 97.2 °F (36.2 °C) (Temporal)   Resp 16   Ht 5' 3.75\" (1.619 m)   Wt 61.7 kg (136 lb)   SpO2 100%   BMI 23.53 kg/m²     Physical Exam  Vitals reviewed.   Constitutional:       General: She is not in acute distress.     Appearance: Normal appearance. She is not toxic-appearing.   HENT:      Head: Normocephalic and atraumatic.      Mouth/Throat:      Mouth: Mucous " membranes are moist.      Pharynx: No oropharyngeal exudate or posterior oropharyngeal erythema.   Eyes:      General: No scleral icterus.     Extraocular Movements: Extraocular movements intact.      Conjunctiva/sclera: Conjunctivae normal.      Pupils: Pupils are equal, round, and reactive to light.   Cardiovascular:      Rate and Rhythm: Normal rate and regular rhythm.      Heart sounds: No murmur heard.     No gallop.   Pulmonary:      Effort: No respiratory distress.      Breath sounds: Normal breath sounds. No stridor. No wheezing or rhonchi.   Abdominal:      General: Bowel sounds are normal. There is no distension.      Palpations: Abdomen is soft. There is no mass.      Tenderness: There is no abdominal tenderness.   Musculoskeletal:         General: No swelling, tenderness, deformity or signs of injury.      Cervical back: Normal range of motion. No rigidity.      Right lower leg: No edema.      Left lower leg: No edema.   Lymphadenopathy:      Cervical: No cervical adenopathy.   Skin:     Capillary Refill: Capillary refill takes less than 2 seconds.      Coloration: Skin is not jaundiced or pale.      Findings: No bruising or erythema.   Neurological:      General: No focal deficit present.      Mental Status: She is alert and oriented to person, place, and time.      Motor: No weakness.       Reviewed test results and discussed with patient.    Assessment and plan:    1. Malignant neoplasm of right breast in female, estrogen receptor negative, unspecified site of breast     Clover Martin is a 68 year old female with history of right breast hormone receptor negative DCIS with microinvasion in 2006. Patient underwent right breast mastectomy and left breast reduction surgery. She did not require adjuvant therapy. Patient has been running slightly high tumor marker CA 27-29, but level has been stable.     Patient presented to the office today for routine follow-up for her history of right breast  DCIS. Patient's most recent screening mammogram from Jan 2024 was negative for any mammographic evidence of malignancy. CA 27.29 has been stable.     Patient was recommended continue surveillance with annual mammograms and blood work every 6 months. She was recommended to follow-up in office in 1 year.    - CBC and differential; Standing  - Comprehensive metabolic panel; Standing  - Cancer antigen 27.29; Standing    2. Thyroid nodule    Patient with history of thyroid nodules. Also with history of Hashimoto's thyroiditis.  Recent thyroid ultrasound from August 2023 reported 4 nodules in her thyroid, most of which were stable.  None met the criteria for requiring biopsy.  Patient was recommended to get repeat thyroid ultrasound in 1 year around August 2024. Patient will be getting thyroid labs ordered via her PCP.     - US thyroid; Future

## 2024-01-30 NOTE — PATIENT INSTRUCTIONS
Please get bloodwork and US thyroid in August 2024. Please get repeat bloodwork in Jan 2025, few days prior to office follow-up with Dr. Vieira in 1 year.

## 2024-05-06 ENCOUNTER — APPOINTMENT (OUTPATIENT)
Dept: LAB | Facility: AMBULARY SURGERY CENTER | Age: 69
End: 2024-05-06
Payer: MEDICARE

## 2024-05-06 DIAGNOSIS — I10 ESSENTIAL HYPERTENSION, MALIGNANT: ICD-10-CM

## 2024-05-06 DIAGNOSIS — Z17.1 MALIGNANT NEOPLASM OF RIGHT BREAST IN FEMALE, ESTROGEN RECEPTOR NEGATIVE, UNSPECIFIED SITE OF BREAST (HCC): ICD-10-CM

## 2024-05-06 DIAGNOSIS — D49.3 BREAST TUMOR: ICD-10-CM

## 2024-05-06 DIAGNOSIS — R29.898: ICD-10-CM

## 2024-05-06 DIAGNOSIS — R97.8 ABNORMAL TUMOR MARKERS: ICD-10-CM

## 2024-05-06 DIAGNOSIS — E04.2 NONTOXIC MULTINODULAR GOITER: ICD-10-CM

## 2024-05-06 DIAGNOSIS — E78.2 MIXED HYPERLIPIDEMIA: ICD-10-CM

## 2024-05-06 DIAGNOSIS — C50.911 MALIGNANT NEOPLASM OF RIGHT BREAST IN FEMALE, ESTROGEN RECEPTOR NEGATIVE, UNSPECIFIED SITE OF BREAST (HCC): ICD-10-CM

## 2024-05-06 DIAGNOSIS — D17.20 BENIGN LIPOMATOUS NEOPLASM OF SKIN AND SUBCUTANEOUS TISSUE OF UNSPECIFIED LIMB: ICD-10-CM

## 2024-05-06 LAB
ALBUMIN SERPL BCP-MCNC: 4.1 G/DL (ref 3.5–5)
ALP SERPL-CCNC: 81 U/L (ref 34–104)
ALT SERPL W P-5'-P-CCNC: 15 U/L (ref 7–52)
ANION GAP SERPL CALCULATED.3IONS-SCNC: 6 MMOL/L (ref 4–13)
AST SERPL W P-5'-P-CCNC: 17 U/L (ref 13–39)
BASOPHILS # BLD AUTO: 0.04 THOUSANDS/ÂΜL (ref 0–0.1)
BASOPHILS NFR BLD AUTO: 1 % (ref 0–1)
BILIRUB SERPL-MCNC: 0.38 MG/DL (ref 0.2–1)
BUN SERPL-MCNC: 18 MG/DL (ref 5–25)
CALCIUM SERPL-MCNC: 9.1 MG/DL (ref 8.4–10.2)
CHLORIDE SERPL-SCNC: 102 MMOL/L (ref 96–108)
CHOLEST SERPL-MCNC: 151 MG/DL
CO2 SERPL-SCNC: 33 MMOL/L (ref 21–32)
CREAT SERPL-MCNC: 0.53 MG/DL (ref 0.6–1.3)
EOSINOPHIL # BLD AUTO: 0.38 THOUSAND/ÂΜL (ref 0–0.61)
EOSINOPHIL NFR BLD AUTO: 6 % (ref 0–6)
ERYTHROCYTE [DISTWIDTH] IN BLOOD BY AUTOMATED COUNT: 15.3 % (ref 11.6–15.1)
GFR SERPL CREATININE-BSD FRML MDRD: 97 ML/MIN/1.73SQ M
GLUCOSE P FAST SERPL-MCNC: 94 MG/DL (ref 65–99)
HCT VFR BLD AUTO: 42.1 % (ref 34.8–46.1)
HDLC SERPL-MCNC: 51 MG/DL
HGB BLD-MCNC: 13 G/DL (ref 11.5–15.4)
IMM GRANULOCYTES # BLD AUTO: 0.01 THOUSAND/UL (ref 0–0.2)
IMM GRANULOCYTES NFR BLD AUTO: 0 % (ref 0–2)
LDLC SERPL CALC-MCNC: 73 MG/DL (ref 0–100)
LYMPHOCYTES # BLD AUTO: 1.65 THOUSANDS/ÂΜL (ref 0.6–4.47)
LYMPHOCYTES NFR BLD AUTO: 27 % (ref 14–44)
MCH RBC QN AUTO: 27.1 PG (ref 26.8–34.3)
MCHC RBC AUTO-ENTMCNC: 30.9 G/DL (ref 31.4–37.4)
MCV RBC AUTO: 88 FL (ref 82–98)
MONOCYTES # BLD AUTO: 0.58 THOUSAND/ÂΜL (ref 0.17–1.22)
MONOCYTES NFR BLD AUTO: 9 % (ref 4–12)
NEUTROPHILS # BLD AUTO: 3.55 THOUSANDS/ÂΜL (ref 1.85–7.62)
NEUTS SEG NFR BLD AUTO: 57 % (ref 43–75)
NONHDLC SERPL-MCNC: 100 MG/DL
NRBC BLD AUTO-RTO: 0 /100 WBCS
PLATELET # BLD AUTO: 272 THOUSANDS/UL (ref 149–390)
PMV BLD AUTO: 11 FL (ref 8.9–12.7)
POTASSIUM SERPL-SCNC: 3.9 MMOL/L (ref 3.5–5.3)
PROT SERPL-MCNC: 6.6 G/DL (ref 6.4–8.4)
RBC # BLD AUTO: 4.8 MILLION/UL (ref 3.81–5.12)
SODIUM SERPL-SCNC: 141 MMOL/L (ref 135–147)
TRIGL SERPL-MCNC: 133 MG/DL
TSH SERPL DL<=0.05 MIU/L-ACNC: 3.68 UIU/ML (ref 0.45–4.5)
WBC # BLD AUTO: 6.21 THOUSAND/UL (ref 4.31–10.16)

## 2024-05-06 PROCEDURE — 36415 COLL VENOUS BLD VENIPUNCTURE: CPT

## 2024-05-06 PROCEDURE — 80053 COMPREHEN METABOLIC PANEL: CPT

## 2024-05-06 PROCEDURE — 86300 IMMUNOASSAY TUMOR CA 15-3: CPT

## 2024-05-06 PROCEDURE — 84443 ASSAY THYROID STIM HORMONE: CPT

## 2024-05-06 PROCEDURE — 85025 COMPLETE CBC W/AUTO DIFF WBC: CPT

## 2024-05-06 PROCEDURE — 80061 LIPID PANEL: CPT

## 2024-05-07 LAB — CANCER AG27-29 SERPL-ACNC: 49.8 U/ML (ref 0–38.6)

## 2024-07-08 ENCOUNTER — HOSPITAL ENCOUNTER (OUTPATIENT)
Dept: ULTRASOUND IMAGING | Facility: HOSPITAL | Age: 69
Discharge: HOME | End: 2024-07-08
Payer: MEDICARE

## 2024-07-08 DIAGNOSIS — N28.1 KIDNEY CYST, ACQUIRED: ICD-10-CM

## 2024-07-08 PROCEDURE — 76775 US EXAM ABDO BACK WALL LIM: CPT

## 2024-08-22 ENCOUNTER — HOSPITAL ENCOUNTER (OUTPATIENT)
Dept: ULTRASOUND IMAGING | Facility: HOSPITAL | Age: 69
Discharge: HOME | End: 2024-08-22
Attending: INTERNAL MEDICINE
Payer: MEDICARE

## 2024-08-22 DIAGNOSIS — E04.1 THYROID NODULE: ICD-10-CM

## 2024-08-22 PROCEDURE — 76536 US EXAM OF HEAD AND NECK: CPT

## 2024-09-17 ENCOUNTER — OFFICE VISIT (OUTPATIENT)
Dept: UROLOGY | Facility: CLINIC | Age: 69
End: 2024-09-17
Payer: MEDICARE

## 2024-09-17 VITALS
OXYGEN SATURATION: 98 % | BODY MASS INDEX: 23.39 KG/M2 | WEIGHT: 137 LBS | SYSTOLIC BLOOD PRESSURE: 152 MMHG | HEIGHT: 64 IN | HEART RATE: 82 BPM | DIASTOLIC BLOOD PRESSURE: 82 MMHG

## 2024-09-17 DIAGNOSIS — N28.1 KIDNEY CYST, ACQUIRED: Primary | ICD-10-CM

## 2024-09-17 PROCEDURE — 99213 OFFICE O/P EST LOW 20 MIN: CPT | Performed by: PHYSICIAN ASSISTANT

## 2024-09-17 NOTE — PROGRESS NOTES
UROLOGY PROGRESS NOTE   Patient Identifiers: Clover Martin (MRN [id number])  Date of Service: 9/17/2024    Subjective:   69-year-old female history of complex right renal cyst.  She has a history of breast cancer.  She has been under surveillance through MRI and ultrasound since 2017 with no significant change.  She does not get lower urinary tract issues or UTI.    Reason for visit: Kidney cyst follow-up    Objective:     VITALS:    Vitals:    09/17/24 1054   BP: 152/82   Pulse: 82   SpO2: 98%           LABS:  Lab Results   Component Value Date    HGB 13.0 05/06/2024    HCT 42.1 05/06/2024    WBC 6.21 05/06/2024     05/06/2024   ]    Lab Results   Component Value Date     11/15/2017    K 3.9 05/06/2024     05/06/2024    CO2 33 (H) 05/06/2024    BUN 18 05/06/2024    CREATININE 0.53 (L) 05/06/2024    CALCIUM 9.1 05/06/2024   ]        INPATIENT MEDS:    Current Outpatient Medications:     Ascorbic Acid 500 MG CAPS, Take 500 mg by mouth daily, Disp: , Rfl:     aspirin 81 MG tablet, Take 81 mg by mouth daily  , Disp: , Rfl:     Cholecalciferol 1000 units capsule, Take 1,000 Units by mouth daily  , Disp: , Rfl:     Coenzyme Q10 10 MG capsule, Once daily, Disp: , Rfl:     EPINEPHrine (EPIPEN) 0.3 mg/0.3 mL SOAJ, AS DIRECTED- INJECT AT INSECT STING, Disp: , Rfl:     Flaxseed, Linseed, (Flaxseed Oil) 1000 MG CAPS, if needed, Disp: , Rfl:     Glucosamine 750 MG TABS, Take 300 mg by mouth daily  , Disp: , Rfl:     losartan (COZAAR) 100 MG tablet, Take 100 mg by mouth daily , Disp: , Rfl:     metoprolol succinate (TOPROL-XL) 25 mg 24 hr tablet, Take 25 mg by mouth daily, Disp: , Rfl:     Multiple Vitamins-Minerals (MULTI COMPLETE) CAPS, Take by mouth daily  , Disp: , Rfl:     Omega-3 Fatty Acids ([employer]S FISH OIL) 1000 MG CAPS, Take by mouth daily  , Disp: , Rfl:     TURMERIC CURCUMIN PO, by Does not apply route daily  , Disp: , Rfl:       Physical Exam:   /82 (BP Location: Left arm, Patient  "Position: Sitting, Cuff Size: Standard)   Pulse 82   Ht 5' 3.75\" (1.619 m)   Wt 62.1 kg (137 lb)   SpO2 98%   BMI 23.70 kg/m²   GEN: no acute distress    RESP: breathing comfortably with no accessory muscle use    ABD: soft, non-tender, non-distended   INCISION:    EXT: no significant peripheral edema       RADIOLOGY:   IMPRESSION:     1. Multiple right renal cysts.  2. Scattered twinkle artifact in the upper left renal pole may represent calcifications.      Assessment:   #1.  Stable right renal cyst    Plan:   -Follow-up with ultrasound in 1 year and call for results  -If stable can see in 2 years with another ultrasound at that time  -  -          "

## 2024-09-23 ENCOUNTER — ANNUAL EXAM (OUTPATIENT)
Dept: OBGYN CLINIC | Facility: CLINIC | Age: 69
End: 2024-09-23
Payer: MEDICARE

## 2024-09-23 VITALS
DIASTOLIC BLOOD PRESSURE: 82 MMHG | BODY MASS INDEX: 23.22 KG/M2 | WEIGHT: 136 LBS | HEIGHT: 64 IN | SYSTOLIC BLOOD PRESSURE: 140 MMHG

## 2024-09-23 DIAGNOSIS — B37.2 SKIN YEAST INFECTION: ICD-10-CM

## 2024-09-23 DIAGNOSIS — Z12.31 ENCOUNTER FOR SCREENING MAMMOGRAM FOR MALIGNANT NEOPLASM OF BREAST: ICD-10-CM

## 2024-09-23 DIAGNOSIS — Z01.419 WOMEN'S ANNUAL ROUTINE GYNECOLOGICAL EXAMINATION: Primary | ICD-10-CM

## 2024-09-23 PROCEDURE — G0101 CA SCREEN;PELVIC/BREAST EXAM: HCPCS | Performed by: OBSTETRICS & GYNECOLOGY

## 2024-09-23 RX ORDER — NYSTATIN 100000 U/G
OINTMENT TOPICAL 2 TIMES DAILY
Qty: 30 G | Refills: 0 | Status: SHIPPED | OUTPATIENT
Start: 2024-09-23

## 2024-09-23 NOTE — PATIENT INSTRUCTIONS
"VAGINAL DRYNESS OVERVIEW    Vaginal dryness is a common condition in postmenopausal women (ie, women who have been through menopause). This condition is also common in women who have had both of their ovaries surgically removed, for example, to treat or prevent cancer. You may hear your health care provider use the term \"atrophic vaginitis\" or \"genitourinary syndrome of menopause.\"    In some women, vaginal dryness leads to uncomfortable symptoms, such as pain with sex, burning vaginal discomfort or itching, or abnormal vaginal discharge. There may also be related urinary symptoms, such as frequent or painful urination. Other women do not notice bothersome symptoms at all.    Fortunately, there are several effective treatments available.     VAGINAL DRYNESS CAUSES    The hormone estrogen helps to keep the vagina moist, maintain thickness of the vaginal lining, and keep the tissue flexible (also called \"elasticity\"). Vaginal dryness occurs when your body produces a decreased amount of estrogen. This can be permanent or temporary and can occur at different times throughout life, such as:    ?At the time of menopause (one year after monthly periods stop).     ?After surgical removal of the ovaries, chemotherapy, or radiation therapy of the pelvis to treat cancer.    ?After having a baby in women who are breastfeeding - Estrogen production returns to normal when breastfeeding becomes less frequent or is stopped.    ?While using certain medications, such as danazol, medroxyprogesterone (brand names: Provera, Depo-Provera), leuprolide (brand name: Lupron), or nafarelin - When these medications are stopped, estrogen production usually returns to normal.    VAGINAL DRYNESS TREATMENT    There are several treatment options for women with vaginal dryness. Some, such as vaginal moisturizers or lubricants, are available without a prescription. Others require a prescription, including a vaginal estrogen cream, tablet, capsule, or " ring; an oral medication called ospemifene; and a vaginal tablet called prasterone. These treatments usually work temporarily; your symptoms will return when the treatment is stopped unless your ovaries start producing more estrogen again.    Vaginal lubricants and moisturizers -- You can buy these without a prescription in most pharmacies. Vaginal lubricants and moisturizers do not contain any hormones and have virtually no systemic (body-wide) side effects. Possible local side effects include irritation or a burning feeling after application.    ?Lubricants are designed to reduce friction and discomfort from dryness during sexual intercourse. The lubricant is applied inside the vagina and/or on the partner's penis or fingers just before sex. Products sold specifically as vaginal lubricants are more effective than lubricants that are not designed for this purpose, such as petroleum jelly. In addition, oil-based lubricants like petroleum jelly, baby oil, or mineral oil can damage latex condoms and/or diaphragms and make them less effective in preventing pregnancy or sexually transmitted infections. Lubricants that are made with water or silicones can be used with latex condoms and diaphragms. Polyurethane condoms can be used with oil-based products.    Natural lubricants, such as olive, coconut, avocado, or peanut oil, are easily available products that may be used as a lubricant with sex. However, it is important to know that, like the oil-based lubricants, natural oils are not recommended for use with latex condoms or diaphragms, as they can damage the latex. Water- or silicone-based lubricants are a better choice if you use condoms or a diaphragm.    ?Vaginal moisturizers are formulated to allow the vaginal tissues to retain moisture more effectively. Moisturizers are applied into the vagina approximately three times weekly to allow a continuous moisturizing effect. Be sure to check the label to ensure that you  are purchasing a moisturizer and not a lubricant.    Hand and body lotions and moisturizers should not be used to relieve vaginal dryness since they can be irritating to the vaginal tissues.    Vaginal estrogen -- Vaginal estrogen is one of the most effective treatment options for vaginal dryness. Vaginal estrogen requires a prescription from your health care provider.    Some women worry about possible side effects from hormones. Very low doses of estrogen can be used to treat vaginal dryness when it is in the form of a vaginal cream or insertable tablet, capsule, or ring. A small amount of estrogen is absorbed into the bloodstream with these vaginal forms, but when used regularly, the level of estrogen in the blood is in the same range as in postmenopausal women who are not using vaginal estrogen. As a result, there is a much lower risk of the side effects people sometimes worry about, such as blood clots, breast cancer, and heart attack, compared with other estrogen-containing products (eg, birth control pills, menopausal hormone therapy).    Several types of vaginal estrogen products are available:    ?Cream - Estrogen cream is measured with an applicator and inserted into the vagina (if inserting the applicator is uncomfortable, you can also use your finger). The cream is usually inserted every day for two weeks and then one or two times weekly after that. With some doses, you may also need to a take another hormonal medication, called a progestin. This is to prevent the lining of your uterus from building up and becoming precancerous or cancerous. Ask your health care provider whether you need to take a progestin with the vaginal estrogen cream you are using.    ?Tablet or capsule - The vaginal estrogen insert is a small tablet or capsule that you put in your vagina. It comes packaged in a disposable applicator. It is usually inserted every day for two weeks and then twice weekly after that. Some women find it  uncomfortable to insert the applicator at first, when vaginal dryness is at its worst; if you have this problem, ask your health care provider if you should use another form of vaginal estrogen.    ?Ring - The vaginal estrogen ring (brand name: Estring) is a flexible plastic ring you wear inside your vagina all the time. You replace it with a new ring every three months. The ring does not need to be removed during sex or bathing. It cannot be felt by most women or their sexual partners. In women who have previously had a hysterectomy, the ring will sometimes fall out.    The estrogen ring used to treat vaginal dryness should not be confused with a different estrogen replacement vaginal ring (brand name: Femring), which releases a much higher dose of estrogen. The estrogen in the higher dose ring is intended to be absorbed into the body to relieve hot flashes in women going through menopause.     How long can I use vaginal estrogen? -- As low-dose vaginal estrogen is associated with few adverse effects, it can probably be used indefinitely, although there are no long-term studies to provide data about the effects over many years.    Is vaginal estrogen safe if I have a history of breast cancer? -- The safety of vaginal estrogen in women who have a past history of breast cancer is debated. A small amount of estrogen can be absorbed from the vagina into the bloodstream. If you have a history of breast cancer, talk to your health care provider or oncologist about the potential risks and benefits of vaginal estrogen.    Prasterone (dehydroepiandrosterone) -- Prasterone, also known as dehydroepiandrosterone (DHEA), is also an option for women with vaginal dryness due to menopause. It comes in the form of a suppository that you insert into your vagina once a day. Vaginal estrogen therapy is more commonly used than prasterone because vaginal estrogen has been studied more thoroughly and the dosing schedule may be more  convenient. However, prasterone is an option for women who cannot take estrogen or prefer to avoid it, but who can use other vaginal hormones.    Ospemifene -- Ospemifene is a prescription medication that is similar to estrogen but is not estrogen. In the vaginal tissue, it acts similarly to estrogen. It comes in a pill and is prescribed for women who want to use an estrogen-like medication for vaginal dryness but prefer not to use (or have trouble inserting) a vaginal medication. Ospemifene may cause hot flashes as a side effect; it may also increase the risk of blood clots or uterine cancer. Long-term studies of ospemifene are needed to evaluate the risk of these complications. This medication has not been tested in women who have had breast cancer or are at a high risk of developing breast cancer.    Sexual activity -- Vaginal estrogen improves dryness quickly, usually within a few weeks. If sex is not uncomfortable, you may continue to have sex as you treat vaginal dryness. Basye may help the vaginal tissues by keeping them soft and stretchable. If sex continues to be painful despite treatment for vaginal dryness, talk to your health care provider. There may be other things you can try such as pelvic floor physical therapy.     [email or web address]

## 2024-09-23 NOTE — PROGRESS NOTES
"ASSESSMENT & PLAN:   Diagnoses and all orders for this visit:    Women's annual routine gynecological examination    Encounter for screening mammogram for malignant neoplasm of breast  -     Mammo screening bilateral w 3d and cad; Future          The following were reviewed in today's visit: ASCCP guidelines (Pap screen not indicated after age 65), STD testing breast self exam, mammography screening ordered, menopause, and exercise.    Patient to return to office in yearly for annual exam.     All questions have been answered to her satisfaction.        CC:  Annual Gynecologic Examination  Chief Complaint   Patient presents with    Gynecologic Exam     Annual exam, pap not indicated. Pt is well, no gyn concerns a this time.   pap 2019 NILM, neg HPV  No cervix, no further paps   Mammo 25- scheduled 26  colonoscopy 24 - repeat 7 years       HPI: Clover Martin is a 69 y.o.  who presents for annual gynecologic examination.  She has the following concerns:  ***      Health Maintenance:    Exercise: {desc; exercise frequency:00207}  Breast exams/breast awareness: {YES/NO:80053}  Last mammogram: ***  Colorectal cancer screening: ***  DEXA: ***    Past Medical History:   Diagnosis Date    Arthritis     Bowel incontinence     mild    Bunion of great toe of right foot     Cancer (HCC) 2006    Colon polyp     Dental crown present     Environmental allergies     ragweed    Exercises 5 to 6 times per week     treadmill    GERD (gastroesophageal reflux disease)     History of kidney stones     History of Lyme disease     History of palpitations     Hypertension     Kidney cysts     \"bilat to be followed in 6 months with CT Scan\"    Kidney stone     Left arm pain     \"occas related to lifting weights recently\"    Limb alert care status     RIGHT ;no BP/IV's    Lung nodule     Malignant neoplasm of upper-outer quadrant of right female breast (HCC)     right    Motion sickness     Thyroid " "disorder     nodules/enlarged    Tooth missing     Wears glasses        Past Surgical History:   Procedure Laterality Date    ABDOMINOPLASTY      BREAST SURGERY Right     CA     SECTION      COLONOSCOPY      ESOPHAGOGASTRODUODENOSCOPY      HYSTERECTOMY      KNEE ARTHROSCOPY Left     MASTECTOMY Right     ID HALLUX RIGIDUS W/CHEILECTOMY 1ST MP JT W/IMPLT Right 2018    Procedure: CHEILECTOMY HALLUX RIGIDUS; GREAT TOE CARTIVA IMPLANT;  Surgeon: Michael Corral DPM;  Location: [facility];  Service: Podiatry    US GUIDED THYROID BIOPSY  2020       Past OB/Gyn History:   No LMP recorded. Patient has had a hysterectomy.    Patient is menopausal.   Menopausal symptoms: {NONE:18818}  Last Pap: *** : {Findings; lab pap smear results:30161::\"no abnormalities\"}; further pap screening not indicated  History of abnormal Pap smear: {yes***/no:35359}    Patient {IS/ IS NOT:37042} currently sexually active.     Family History  Family History   Problem Relation Age of Onset    No Known Problems Mother     Prostate cancer Father     Hypertension Father     Stroke Father     Cancer Father     Heart failure Father     No Known Problems Sister     No Known Problems Sister     No Known Problems Sister     No Known Problems Daughter     No Known Problems Daughter     No Known Problems Daughter     Breast cancer Neg Hx     Ovarian cancer Neg Hx     Colon cancer Neg Hx        Family history of uterine or ovarian cancer: {YES/NO:}  Family history of breast cancer: {YES/NO:}  Family history of colon cancer: {YES/NO:}    Social History:  Social History     Socioeconomic History    Marital status: /Civil Union     Spouse name: Not on file    Number of children: Not on file    Years of education: Not on file    Highest education level: Not on file   Occupational History    Not on file   Tobacco Use    Smoking status: Never     Passive exposure: Never    Smokeless tobacco: Never   Vaping Use    Vaping " status: Never Used   Substance and Sexual Activity    Alcohol use: No    Drug use: No    Sexual activity: Yes     Partners: Male     Birth control/protection: Post-menopausal   Other Topics Concern    Not on file   Social History Narrative    Not on file     Social Determinants of Health     Financial Resource Strain: Not on file   Food Insecurity: Not on file   Transportation Needs: Not on file   Physical Activity: Not on file   Stress: Not on file   Social Connections: Not on file   Intimate Partner Violence: Not on file   Housing Stability: Not on file     Domestic violence screen: {Desc; negative/positive:36023}    Allergies:  Allergies   Allergen Reactions    Pollen Extract Allergic Rhinitis       Medications:    Current Outpatient Medications:     Ascorbic Acid 500 MG CAPS, Take 500 mg by mouth daily, Disp: , Rfl:     aspirin 81 MG tablet, Take 81 mg by mouth daily  , Disp: , Rfl:     Cholecalciferol 1000 units capsule, Take 1,000 Units by mouth daily  , Disp: , Rfl:     Coenzyme Q10 10 MG capsule, Once daily, Disp: , Rfl:     Glucosamine 750 MG TABS, Take 300 mg by mouth daily  , Disp: , Rfl:     losartan (COZAAR) 100 MG tablet, Take 100 mg by mouth daily , Disp: , Rfl:     metoprolol succinate (TOPROL-XL) 25 mg 24 hr tablet, Take 25 mg by mouth daily, Disp: , Rfl:     Multiple Vitamins-Minerals (MULTI COMPLETE) CAPS, Take by mouth daily  , Disp: , Rfl:     Omega-3 Fatty Acids ([employer] FISH OIL) 1000 MG CAPS, Take by mouth daily  , Disp: , Rfl:     TURMERIC CURCUMIN PO, by Does not apply route daily  , Disp: , Rfl:     EPINEPHrine (EPIPEN) 0.3 mg/0.3 mL SOAJ, AS DIRECTED- INJECT AT INSECT STING (Patient not taking: Reported on 9/23/2024), Disp: , Rfl:     Flaxseed, Linseed, (Flaxseed Oil) 1000 MG CAPS, if needed (Patient not taking: Reported on 9/23/2024), Disp: , Rfl:     Review of Systems:  Review of Systems      Physical Exam:  /82 (BP Location: Left arm, Patient Position: Sitting, Cuff Size: Standard)   " Ht 5' 3.75\" (1.619 m)   Wt 61.7 kg (136 lb)   BMI 23.53 kg/m²    OBGyn Exam                                  "

## 2024-09-23 NOTE — PROGRESS NOTES
"ASSESSMENT & PLAN:   Diagnoses and all orders for this visit:    Women's annual routine gynecological examination    Encounter for screening mammogram for malignant neoplasm of breast  -     Mammo screening bilateral w 3d and cad; Future    Skin yeast infection  -     nystatin (MYCOSTATIN) ointment; Apply topically 2 (two) times a day          The following were reviewed in today's visit: ASCCP guidelines (Pap screen not indicated after age 65), STD testing breast self exam, mammography screening ordered, menopause, adequate intake of calcium and vitamin D, and exercise.    Patient to return to office in yearly for annual exam.     All questions have been answered to her satisfaction.        CC:  Annual Gynecologic Examination  Chief Complaint   Patient presents with    Gynecologic Exam     Annual exam, pap not indicated. Pt is well, no gyn concerns a this time.   pap 2019 NILM, neg HPV  No cervix, no further paps   Mammo 24- scheduled 26  colonoscopy 23 - repeat 7 years       HPI: Clover Martin is a 69 y.o.  who presents for annual gynecologic examination.  She has the following concerns:  itchy rash under arms on lateral breast did try hydrocortisone, for one month.     Vaginal dryness      Health Maintenance:    Exercise: frequently  Breast exams/breast awareness: yes  Last mammogram: 2024 WNL  Colorectal cancer screenin2023 WNL  DEXA: 2023     Past Medical History:   Diagnosis Date    Arthritis     Bowel incontinence     mild    Bunion of great toe of right foot     Cancer (HCC) 2006    Colon polyp     Dental crown present     Environmental allergies     ragweed    Exercises 5 to 6 times per week     treadmill    GERD (gastroesophageal reflux disease)     History of kidney stones     History of Lyme disease     History of palpitations     Hypertension     Kidney cysts     \"bilat to be followed in 6 months with CT Scan\"    Kidney stone     Left arm pain     \"occas " "related to lifting weights recently\"    Limb alert care status     RIGHT ;no BP/IV's    Lung nodule     Malignant neoplasm of upper-outer quadrant of right female breast (HCC)     right    Motion sickness     Thyroid disorder     nodules/enlarged    Tooth missing     Wears glasses        Past Surgical History:   Procedure Laterality Date    ABDOMINOPLASTY      BREAST SURGERY Right     CA     SECTION      COLONOSCOPY      ESOPHAGOGASTRODUODENOSCOPY      HYSTERECTOMY      KNEE ARTHROSCOPY Left     MASTECTOMY Right     AR HALLUX RIGIDUS W/CHEILECTOMY 1ST MP JT W/IMPLT Right 2018    Procedure: CHEILECTOMY HALLUX RIGIDUS; GREAT TOE CARTIVA IMPLANT;  Surgeon: Michael Corral DPM;  Location: [facility];  Service: Podiatry    US GUIDED THYROID BIOPSY  2020       Past OB/Gyn History:   No LMP recorded. Patient has had a hysterectomy.    Patient is menopausal.   Menopausal symptoms: hot flashes, vaginal dryness  Last Pap: 2019 : no abnormalities; further pap screening not indicated  History of abnormal Pap smear: no    Patient is currently sexually active.     Family History  Family History   Problem Relation Age of Onset    No Known Problems Mother     Prostate cancer Father     Hypertension Father     Stroke Father     Cancer Father     Heart failure Father     No Known Problems Sister     No Known Problems Sister     No Known Problems Sister     No Known Problems Daughter     No Known Problems Daughter     No Known Problems Daughter     Breast cancer Neg Hx     Ovarian cancer Neg Hx     Colon cancer Neg Hx        Family history of uterine or ovarian cancer: no  Family history of breast cancer: yes personal history  Family history of colon cancer: no    Social History:  Social History     Socioeconomic History    Marital status: /Civil Union     Spouse name: Not on file    Number of children: Not on file    Years of education: Not on file    Highest education level: Not on file   Occupational " History    Not on file   Tobacco Use    Smoking status: Never     Passive exposure: Never    Smokeless tobacco: Never   Vaping Use    Vaping status: Never Used   Substance and Sexual Activity    Alcohol use: No    Drug use: No    Sexual activity: Yes     Partners: Male     Birth control/protection: Post-menopausal   Other Topics Concern    Not on file   Social History Narrative    Not on file     Social Determinants of Health     Financial Resource Strain: Not on file   Food Insecurity: Not on file   Transportation Needs: Not on file   Physical Activity: Not on file   Stress: Not on file   Social Connections: Not on file   Intimate Partner Violence: Not on file   Housing Stability: Not on file     Domestic violence screen: negative    Allergies:  Allergies   Allergen Reactions    Pollen Extract Allergic Rhinitis       Medications:    Current Outpatient Medications:     Ascorbic Acid 500 MG CAPS, Take 500 mg by mouth daily, Disp: , Rfl:     aspirin 81 MG tablet, Take 81 mg by mouth daily  , Disp: , Rfl:     Cholecalciferol 1000 units capsule, Take 1,000 Units by mouth daily  , Disp: , Rfl:     Coenzyme Q10 10 MG capsule, Once daily, Disp: , Rfl:     Glucosamine 750 MG TABS, Take 300 mg by mouth daily  , Disp: , Rfl:     losartan (COZAAR) 100 MG tablet, Take 100 mg by mouth daily , Disp: , Rfl:     metoprolol succinate (TOPROL-XL) 25 mg 24 hr tablet, Take 25 mg by mouth daily, Disp: , Rfl:     Multiple Vitamins-Minerals (MULTI COMPLETE) CAPS, Take by mouth daily  , Disp: , Rfl:     nystatin (MYCOSTATIN) ointment, Apply topically 2 (two) times a day, Disp: 30 g, Rfl: 0    Omega-3 Fatty Acids ([employer] FISH OIL) 1000 MG CAPS, Take by mouth daily  , Disp: , Rfl:     TURMERIC CURCUMIN PO, by Does not apply route daily  , Disp: , Rfl:     EPINEPHrine (EPIPEN) 0.3 mg/0.3 mL SOAJ, AS DIRECTED- INJECT AT INSECT STING (Patient not taking: Reported on 9/23/2024), Disp: , Rfl:     Review of Systems:  Review of Systems  "  Constitutional:  Negative for chills and fever.   Respiratory:  Negative for chest tightness, shortness of breath and wheezing.    Cardiovascular:  Negative for chest pain and leg swelling.   Gastrointestinal:  Negative for abdominal distention, abdominal pain, blood in stool, constipation, nausea and vomiting.   Genitourinary:  Positive for urgency. Negative for difficulty urinating, dyspareunia, frequency, hematuria, vaginal bleeding, vaginal discharge and vaginal pain.   Neurological:  Negative for headaches.         Physical Exam:  /82 (BP Location: Left arm, Patient Position: Sitting, Cuff Size: Standard)   Ht 5' 3.75\" (1.619 m)   Wt 61.7 kg (136 lb)   BMI 23.53 kg/m²    Physical Exam  Constitutional:       General: She is not in acute distress.     Appearance: Normal appearance.   Genitourinary:      Vulva, bladder and urethral meatus normal.      No lesions in the vagina.      Right Labia: No rash, lesions or skin changes.     Left Labia: No lesions, skin changes or rash.     Vaginal cuff intact.     No vaginal discharge, erythema or bleeding.      Anterior and apical vaginal prolapse present.     Moderate vaginal atrophy present.       Right Adnexa: not tender, not full and no mass present.     Left Adnexa: not tender, not full and no mass present.     Cervix is absent.      Uterus is absent.   Breasts:     Right: Normal. Breast implant present. No inverted nipple, mass, nipple discharge, skin change or tenderness.      Left: Normal. No inverted nipple, mass, nipple discharge, skin change or tenderness.      Breast exam comments: Right mastectomy with reconstruction  .  HENT:      Head: Normocephalic and atraumatic.   Cardiovascular:      Rate and Rhythm: Normal rate and regular rhythm.      Heart sounds: No murmur heard.  Pulmonary:      Effort: Pulmonary effort is normal. No respiratory distress.      Breath sounds: Normal breath sounds. No wheezing.   Abdominal:      General: Abdomen is flat. " There is no distension.      Palpations: Abdomen is soft.      Tenderness: There is no abdominal tenderness.   Musculoskeletal:         General: No swelling. Normal range of motion.      Cervical back: Normal range of motion.   Neurological:      General: No focal deficit present.      Mental Status: She is alert and oriented to person, place, and time. Mental status is at baseline.   Skin:     General: Skin is warm and dry.      Findings: Rash present.      Comments: Erythematous papules in bilateral axilla    Psychiatric:         Mood and Affect: Mood normal.         Behavior: Behavior normal.         Thought Content: Thought content normal.

## 2024-11-18 ENCOUNTER — TELEPHONE (OUTPATIENT)
Age: 69
End: 2024-11-18

## 2024-11-18 NOTE — TELEPHONE ENCOUNTER
Patient called to schedule an appt for a 2nd opinion. Message sent to  [facility].

## 2024-11-22 NOTE — TELEPHONE ENCOUNTER
Caller: Patient    Doctor: Hansel    Reason for call: The patient questioned if there has been any update on her request for a 2nd opinion. Left knee pain. No prior sx's. Patient had an MRI w/[facility]-reports in care everywhere. Email sent to practice admin    Call back#: [phone]

## 2024-11-25 NOTE — TELEPHONE ENCOUNTER
Patient is calling back to ask if she can schedule a NP appt with Hansel Duff. Follow up email was sent to the .

## 2024-11-27 ENCOUNTER — TELEPHONE (OUTPATIENT)
Dept: HEMATOLOGY ONCOLOGY | Facility: CLINIC | Age: 69
End: 2024-11-27

## 2024-11-27 NOTE — TELEPHONE ENCOUNTER
Left voicemail that her appt with Dr Vieira was rescheduled by 1 day to 2/5 at 240 in [place]. Left [place] # if she needs to reschedule

## 2025-01-07 ENCOUNTER — OFFICE VISIT (OUTPATIENT)
Dept: OBGYN CLINIC | Facility: CLINIC | Age: 70
End: 2025-01-07
Payer: MEDICARE

## 2025-01-07 ENCOUNTER — HOSPITAL ENCOUNTER (OUTPATIENT)
Dept: RADIOLOGY | Facility: HOSPITAL | Age: 70
Discharge: HOME | End: 2025-01-07
Attending: ORTHOPAEDIC SURGERY
Payer: MEDICARE

## 2025-01-07 VITALS — WEIGHT: 135 LBS | HEIGHT: 64 IN | BODY MASS INDEX: 23.05 KG/M2

## 2025-01-07 DIAGNOSIS — M25.562 LEFT KNEE PAIN, UNSPECIFIED CHRONICITY: ICD-10-CM

## 2025-01-07 DIAGNOSIS — Z96.651 HISTORY OF TOTAL KNEE REPLACEMENT, RIGHT: Primary | ICD-10-CM

## 2025-01-07 DIAGNOSIS — M25.461 EFFUSION OF RIGHT KNEE: ICD-10-CM

## 2025-01-07 DIAGNOSIS — M25.561 RIGHT KNEE PAIN, UNSPECIFIED CHRONICITY: ICD-10-CM

## 2025-01-07 DIAGNOSIS — M17.12 PRIMARY OSTEOARTHRITIS OF LEFT KNEE: ICD-10-CM

## 2025-01-07 PROCEDURE — 73562 X-RAY EXAM OF KNEE 3: CPT

## 2025-01-07 PROCEDURE — 99204 OFFICE O/P NEW MOD 45 MIN: CPT | Performed by: ORTHOPAEDIC SURGERY

## 2025-01-07 RX ORDER — CELECOXIB 200 MG/1
200 CAPSULE ORAL DAILY
Qty: 30 CAPSULE | Refills: 1 | Status: SHIPPED | OUTPATIENT
Start: 2025-01-07

## 2025-01-07 NOTE — PROGRESS NOTES
Assessment:  1. History of total knee replacement, right        2. Left knee pain, unspecified chronicity  XR knee 3 vw left non injury      3. Right knee pain, unspecified chronicity  XR knee 3 vw right non injury      4. Primary osteoarthritis of left knee        5. Effusion of right knee            Plan:    Patient with left knee osteoarthritis and history of a right total knee arthroplasty performed on 10/30/2023 by Dr. Mitchell at [facility] and reoccurring effusion of the right knee  Weight-bear as tolerated  X-ray bilateral knees were reviewed in the office today with the patient  Disussed with patient conservative treatment which includes compression sleeve, medications and strengthening exercises  Discussed with patient to wear compression sleeve over the right knee for 1 month to help decrease the swelling.  She is not to wear the brace when sleeping at night.  Provided patient with VMO strengthening exercises to do at the gym  Prescribed patient Celebrex 200 mg as needed for pain relief  She may try cryotherapy to help decrease the swelling for the right knee  Patient is to follow-up in 14 weeks for reevaluation        The above stated was discussed in layman's terms and the patient expressed understanding.  All questions were answered to the patient's satisfaction.         Subjective:   Clover Martin is a 69 y.o. female who presents today evaluation for bilateral knee pain.  Patient has history of a right total knee arthroplasty performed on 10/30/2023 at [facility] by Dr. Mitchell.  Patient states she has no pain and her range of motion is excellent but states she keeps on having reoccurring effusion to the right knee.  She has had her right knee aspirated 4 times since after surgery and the last aspiration was on 10/16/2024 which is 60 cc of fluid was removed.  Patient states none of her aspirations were sent out for analysis.  Patient denies any chills or fevers.    Patient  "has been having left knee pain for quite some time.  Notes she has difficulty with going down steps and increased activities.  She did have a left knee steroid injection on 10/16/2024 with a couple weeks of relief.  Patient states currently her right knee pain is very tolerable.  Patient has been taking Advil for pain relief.      Review of systems negative unless otherwise specified in HPI    Past Medical History:   Diagnosis Date    Arthritis     Bowel incontinence     mild    Bunion of great toe of right foot     Cancer (HCC) 2006    Colon polyp     Dental crown present     Environmental allergies     ragweed    Exercises 5 to 6 times per week     treadmill    GERD (gastroesophageal reflux disease)     History of kidney stones     History of Lyme disease     History of palpitations     Hypertension     Kidney cysts     \"bilat to be followed in 6 months with CT Scan\"    Kidney stone     Left arm pain     \"occas related to lifting weights recently\"    Limb alert care status     RIGHT ;no BP/IV's    Lung nodule     Malignant neoplasm of upper-outer quadrant of right female breast (HCC)     right    Motion sickness     Thyroid disorder     nodules/enlarged    Tooth missing     Wears glasses        Past Surgical History:   Procedure Laterality Date    ABDOMINOPLASTY      BREAST SURGERY Right     CA     SECTION      COLONOSCOPY      ESOPHAGOGASTRODUODENOSCOPY      HYSTERECTOMY      KNEE ARTHROSCOPY Left     MASTECTOMY Right     MI HALLUX RIGIDUS W/CHEILECTOMY 1ST MP JT W/IMPLT Right 2018    Procedure: CHEILECTOMY HALLUX RIGIDUS; GREAT TOE CARTIVA IMPLANT;  Surgeon: Michael Corral DPM;  Location: [facility];  Service: Podiatry    US GUIDED THYROID BIOPSY  2020       Family History   Problem Relation Age of Onset    No Known Problems Mother     Prostate cancer Father     Hypertension Father     Stroke Father     Cancer Father     Heart failure Father     No Known Problems Sister     No Known " Problems Sister     No Known Problems Sister     No Known Problems Daughter     No Known Problems Daughter     No Known Problems Daughter     Breast cancer Neg Hx     Ovarian cancer Neg Hx     Colon cancer Neg Hx        Social History     Occupational History    Not on file   Tobacco Use    Smoking status: Never     Passive exposure: Never    Smokeless tobacco: Never   Vaping Use    Vaping status: Never Used   Substance and Sexual Activity    Alcohol use: No    Drug use: No    Sexual activity: Yes     Partners: Male     Birth control/protection: Post-menopausal         Current Outpatient Medications:     Ascorbic Acid 500 MG CAPS, Take 500 mg by mouth daily, Disp: , Rfl:     aspirin 81 MG tablet, Take 81 mg by mouth daily  , Disp: , Rfl:     Cholecalciferol 1000 units capsule, Take 1,000 Units by mouth daily  , Disp: , Rfl:     Coenzyme Q10 10 MG capsule, Once daily, Disp: , Rfl:     Glucosamine 750 MG TABS, Take 300 mg by mouth daily  , Disp: , Rfl:     losartan (COZAAR) 100 MG tablet, Take 100 mg by mouth daily , Disp: , Rfl:     metoprolol succinate (TOPROL-XL) 25 mg 24 hr tablet, Take 25 mg by mouth daily, Disp: , Rfl:     Multiple Vitamins-Minerals (MULTI COMPLETE) CAPS, Take by mouth daily  , Disp: , Rfl:     nystatin (MYCOSTATIN) ointment, Apply topically 2 (two) times a day, Disp: 30 g, Rfl: 0    Omega-3 Fatty Acids ([employer] FISH OIL) 1000 MG CAPS, Take by mouth daily  , Disp: , Rfl:     TURMERIC CURCUMIN PO, by Does not apply route daily  , Disp: , Rfl:     EPINEPHrine (EPIPEN) 0.3 mg/0.3 mL SOAJ, AS DIRECTED- INJECT AT INSECT STING (Patient not taking: Reported on 9/23/2024), Disp: , Rfl:     Allergies   Allergen Reactions    Pollen Extract Allergic Rhinitis          There were no vitals filed for this visit.  Body mass index is 23.35 kg/m².  Wt Readings from Last 3 Encounters:   01/07/25 61.2 kg (135 lb)   09/23/24 61.7 kg (136 lb)   09/17/24 62.1 kg (137 lb)       Objective:            Physical  Exam  Physical Exam:      General Appearance:    Alert, cooperative, no distress, appears stated age   Head:    Normocephalic, without obvious abnormality, atraumatic   Eyes:    conjunctiva/corneas clear, both eyes         Nose:   Nares normal, septum midline, no drainage    Throat:   Lips normal; teeth and gums normal   Neck:    symmetrical, trachea midline, ;     thyroid:  no enlargement/   Back:     Symmetric, no curvature, ROM normal   Lungs:   No audible wheezing or labored breathing   Chest Wall:    No tenderness or deformity    Heart:    Regular rate and rhythm                         Pulses:   2+ and symmetric all extremities   Skin:   Skin color, texture, turgor normal, no rashes or lesions   Neurologic:   normal strength, sensation and reflexes     throughout                       Ortho Exam  Right Knee  Surgical incision well healed   No erythema or open wounds  Mild-moderate  effusion  No Tenderness palpation of medial joint line  No lateral joint line tenderness  Near full extension  Full flexion  Patellar grind test -  Stable to varus and valgus stress  Negative posterior drawer  Negative Lachman test  Neurovascular intact distally     Left Knee  Skin intact  No erythema or open wounds  No effusion  No Tenderness palpation of medial joint line  No lateral joint line tenderness  Near full extension  Full flexion  Patellar grind test -  Stable to varus and valgus stress  Negative posterior drawer  Negative Lachman test  Neurovascular intact distally       Diagnostics, reviewed and taken today if performed as documented:    The attending physician has personally reviewed the pertinent films in PACS and interpretation is as follows: X-ray right knee demonstrates status post TKA adequate of implant with no sign of loosening    X-ray left knee demonstrates tricompartmental mild joint space narrowing worse in the medial and lateral tibiofemoral compartment      Procedures, if performed  "today:    Procedures    None performed      Scribe Attestation      I,:   am acting as a scribe while in the presence of the attending physician.:       I,:   personally performed the services described in this documentation    as scribed in my presence.:               Portions of the record may have been created with voice recognition software.  Occasional wrong word or \"sound a like\" substitutions may have occurred due to the inherent limitations of voice recognition software.  Read the chart carefully and recognize, using context, where substitutions have occurred.  "

## 2025-01-21 ENCOUNTER — TELEPHONE (OUTPATIENT)
Age: 70
End: 2025-01-21

## 2025-01-28 ENCOUNTER — APPOINTMENT (OUTPATIENT)
Dept: LAB | Facility: AMBULARY SURGERY CENTER | Age: 70
End: 2025-01-28
Payer: MEDICARE

## 2025-01-28 DIAGNOSIS — C50.911 MALIGNANT NEOPLASM OF RIGHT BREAST IN FEMALE, ESTROGEN RECEPTOR NEGATIVE, UNSPECIFIED SITE OF BREAST (HCC): ICD-10-CM

## 2025-01-28 DIAGNOSIS — Z17.1 MALIGNANT NEOPLASM OF RIGHT BREAST IN FEMALE, ESTROGEN RECEPTOR NEGATIVE, UNSPECIFIED SITE OF BREAST (HCC): ICD-10-CM

## 2025-01-28 LAB
ALBUMIN SERPL BCG-MCNC: 4.2 G/DL (ref 3.5–5)
ALP SERPL-CCNC: 95 U/L (ref 34–104)
ALT SERPL W P-5'-P-CCNC: 14 U/L (ref 7–52)
ANION GAP SERPL CALCULATED.3IONS-SCNC: 5 MMOL/L (ref 4–13)
AST SERPL W P-5'-P-CCNC: 15 U/L (ref 13–39)
BASOPHILS # BLD AUTO: 0.04 THOUSANDS/ΜL (ref 0–0.1)
BASOPHILS NFR BLD AUTO: 1 % (ref 0–1)
BILIRUB SERPL-MCNC: 0.43 MG/DL (ref 0.2–1)
BUN SERPL-MCNC: 30 MG/DL (ref 5–25)
CALCIUM SERPL-MCNC: 9.1 MG/DL (ref 8.4–10.2)
CHLORIDE SERPL-SCNC: 103 MMOL/L (ref 96–108)
CO2 SERPL-SCNC: 30 MMOL/L (ref 21–32)
CREAT SERPL-MCNC: 0.61 MG/DL (ref 0.6–1.3)
EOSINOPHIL # BLD AUTO: 0.36 THOUSAND/ΜL (ref 0–0.61)
EOSINOPHIL NFR BLD AUTO: 6 % (ref 0–6)
ERYTHROCYTE [DISTWIDTH] IN BLOOD BY AUTOMATED COUNT: 14.6 % (ref 11.6–15.1)
GFR SERPL CREATININE-BSD FRML MDRD: 93 ML/MIN/1.73SQ M
GLUCOSE P FAST SERPL-MCNC: 97 MG/DL (ref 65–99)
HCT VFR BLD AUTO: 40.8 % (ref 34.8–46.1)
HGB BLD-MCNC: 12.9 G/DL (ref 11.5–15.4)
IMM GRANULOCYTES # BLD AUTO: 0.02 THOUSAND/UL (ref 0–0.2)
IMM GRANULOCYTES NFR BLD AUTO: 0 % (ref 0–2)
LYMPHOCYTES # BLD AUTO: 1.66 THOUSANDS/ΜL (ref 0.6–4.47)
LYMPHOCYTES NFR BLD AUTO: 27 % (ref 14–44)
MCH RBC QN AUTO: 27.6 PG (ref 26.8–34.3)
MCHC RBC AUTO-ENTMCNC: 31.6 G/DL (ref 31.4–37.4)
MCV RBC AUTO: 87 FL (ref 82–98)
MONOCYTES # BLD AUTO: 0.57 THOUSAND/ΜL (ref 0.17–1.22)
MONOCYTES NFR BLD AUTO: 9 % (ref 4–12)
NEUTROPHILS # BLD AUTO: 3.47 THOUSANDS/ΜL (ref 1.85–7.62)
NEUTS SEG NFR BLD AUTO: 57 % (ref 43–75)
NRBC BLD AUTO-RTO: 0 /100 WBCS
PLATELET # BLD AUTO: 264 THOUSANDS/UL (ref 149–390)
PMV BLD AUTO: 10.9 FL (ref 8.9–12.7)
POTASSIUM SERPL-SCNC: 4.3 MMOL/L (ref 3.5–5.3)
PROT SERPL-MCNC: 6.7 G/DL (ref 6.4–8.4)
RBC # BLD AUTO: 4.68 MILLION/UL (ref 3.81–5.12)
SODIUM SERPL-SCNC: 138 MMOL/L (ref 135–147)
WBC # BLD AUTO: 6.12 THOUSAND/UL (ref 4.31–10.16)

## 2025-01-28 PROCEDURE — 80053 COMPREHEN METABOLIC PANEL: CPT

## 2025-01-28 PROCEDURE — 85025 COMPLETE CBC W/AUTO DIFF WBC: CPT

## 2025-01-28 PROCEDURE — 36415 COLL VENOUS BLD VENIPUNCTURE: CPT

## 2025-01-28 PROCEDURE — 86300 IMMUNOASSAY TUMOR CA 15-3: CPT

## 2025-01-29 LAB — CANCER AG27-29 SERPL-ACNC: 54.4 U/ML (ref 0–38.6)

## 2025-02-05 ENCOUNTER — OFFICE VISIT (OUTPATIENT)
Dept: HEMATOLOGY ONCOLOGY | Facility: CLINIC | Age: 70
End: 2025-02-05
Payer: MEDICARE

## 2025-02-05 VITALS
BODY MASS INDEX: 22.88 KG/M2 | WEIGHT: 134 LBS | HEART RATE: 82 BPM | DIASTOLIC BLOOD PRESSURE: 84 MMHG | SYSTOLIC BLOOD PRESSURE: 134 MMHG | HEIGHT: 64 IN | TEMPERATURE: 97.3 F | RESPIRATION RATE: 18 BRPM | OXYGEN SATURATION: 100 %

## 2025-02-05 DIAGNOSIS — C50.911 MALIGNANT NEOPLASM OF RIGHT BREAST IN FEMALE, ESTROGEN RECEPTOR NEGATIVE, UNSPECIFIED SITE OF BREAST (HCC): Primary | ICD-10-CM

## 2025-02-05 DIAGNOSIS — Z17.1 MALIGNANT NEOPLASM OF RIGHT BREAST IN FEMALE, ESTROGEN RECEPTOR NEGATIVE, UNSPECIFIED SITE OF BREAST (HCC): Primary | ICD-10-CM

## 2025-02-05 DIAGNOSIS — E55.9 VITAMIN D DEFICIENCY: ICD-10-CM

## 2025-02-05 DIAGNOSIS — R97.8 ABNORMAL TUMOR MARKERS: ICD-10-CM

## 2025-02-05 DIAGNOSIS — E04.1 THYROID NODULE: ICD-10-CM

## 2025-02-05 PROCEDURE — 99214 OFFICE O/P EST MOD 30 MIN: CPT | Performed by: INTERNAL MEDICINE

## 2025-02-05 PROCEDURE — G2211 COMPLEX E/M VISIT ADD ON: HCPCS | Performed by: INTERNAL MEDICINE

## 2025-02-05 NOTE — ASSESSMENT & PLAN NOTE
Has been fluctuating between normal and slightly high and highest has been 60.  Orders:    Cancer antigen 27.29; Standing

## 2025-02-05 NOTE — PROGRESS NOTES
Name: Clover Martin      : 1955      MRN: [id number]  Encounter Provider: Reggie Vieira MD  Encounter Date: 2025   Encounter department: [facility]  :  Assessment & Plan  Malignant neoplasm of right breast in female, estrogen receptor negative, unspecified site of breast (HCC)  Patient has been in remission from breast cancer except tumor marker CA 27.29 has been running slightly high for the last several years.  Sometimes that comes back within normal range  Orders:    CBC and differential; Standing    Comprehensive metabolic panel; Standing    Cancer antigen 27.29; Standing    Thyroid nodule  Being monitored       Abnormal tumor markers  Has been fluctuating between normal and slightly high and highest has been 60.  Orders:    Cancer antigen 27.29; Standing    Vitamin D deficiency  Patient has been taking vitamin D  Orders:    Vitamin D 25 hydroxy; Future    Blood work every 6 months.  Visit in 1 year.  All discussed with patient in very much detail.  Questions answered.  Goal is cure from breast cancer.  Patient is capable of self-care.  Patient will be contacted for follow-up ultrasound of the thyroid.I suggested self breast examination, eating healthy foods, staying active but to avoid falls and trauma.  Suggested health screening tests. Patient to continue to follow-up with primary physician and other consultants.  Provided counseling and support.  I used a dictation device to dictate this note and there could be mistakes in my note and for that patient may contact my office.      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   She did not require adjuvant therapy. She is being followed.  Patient has been running slightly high tumor marker CA 27-29 and  "highest was 60. This time tumor marker is 44.9 and that is being monitored.  No documentation of recurrent or metastatic breast cancer.    Patient  has stable thyroid nodules, tiny lung nodules and renal cysts and they are being monitored.  Lung nodules are between 2 mm and 4 mm and are stable for more than 5 years .  She has left ovarian cyst and she follows with her gynecologist.  For examination of breast and imaging studies she follows with her breast surgeon.  She goes to her urologist for renal cysts.   She has been taking vitamin D for vitamin D deficiency and I will be checked again she has some tiredness and arthritic symptoms and occasionally low back discomfort.  She is anxious.  She has knee problem and she states she will be getting knee surgery in the near future.     Pertinent Medical History   02/05/25:   Details are in HPI and in assessment section      Review of Systems  Reviewed 12 systems.  Symptoms are in HPI.  No symptoms like fevers, chills, bleeding, bone pains, skin rash, weight loss, night sweats and no swelling of the ankles and no swollen glands.  No numbness.  No claudication.  No other neurological, cardiac, pulmonary, GI and  symptoms other than listed in HPI.      Objective   /84 (BP Location: Left arm, Patient Position: Sitting, Cuff Size: Adult)   Pulse 82   Temp (!) 97.3 °F (36.3 °C) (Temporal)   Resp 18   Ht 5' 3.75\" (1.619 m)   Wt 60.8 kg (134 lb)   SpO2 100%   BMI 23.18 kg/m²     Pain Screening:  Pain Score: 0-No pain  ECOG   1  Physical Exam  Vitals reviewed.   Constitutional:       General: She is not in acute distress.     Appearance: Normal appearance. She is not ill-appearing.   HENT:      Head: Normocephalic and atraumatic.      Mouth/Throat:      Comments: No thrush.  Eyes:      General: No scleral icterus.  Cardiovascular:      Rate and Rhythm: Normal rate and regular rhythm.      Heart sounds: Normal heart sounds. No murmur heard.  Pulmonary:      Effort: " Pulmonary effort is normal. No respiratory distress.      Breath sounds: Normal breath sounds. No rhonchi or rales.   Abdominal:      General: Abdomen is flat. There is no distension.      Palpations: Abdomen is soft. There is no mass.      Tenderness: There is no abdominal tenderness.      Comments: No ascites.    Musculoskeletal:         General: No swelling. Normal range of motion.      Cervical back: Normal range of motion.      Right lower leg: No edema.      Left lower leg: No edema.      Comments: No calf tenderness.   Lymphadenopathy:      Cervical: No cervical adenopathy.      Upper Body:      Right upper body: No supraclavicular or axillary adenopathy.      Left upper body: No supraclavicular or axillary adenopathy.   Skin:     Coloration: Skin is not jaundiced or pale.      Findings: No bruising or rash.      Nails: There is no clubbing.   Neurological:      General: No focal deficit present.      Mental Status: She is alert and oriented to person, place, and time.      Motor: No weakness.      Coordination: Coordination normal.      Gait: Gait normal.   Psychiatric:         Behavior: Behavior normal.         Thought Content: Thought content normal.         Judgment: Judgment normal.      Comments: Anxious.     My medical assistant Yamilet was in the room with me during examination and there was no palpable breast mass.  No lymphedema.    Labs: I have reviewed the following labs:  Lab Results   Component Value Date/Time    WBC 6.12 01/28/2025 08:24 AM    RBC 4.68 01/28/2025 08:24 AM    Hemoglobin 12.9 01/28/2025 08:24 AM    Hematocrit 40.8 01/28/2025 08:24 AM    MCV 87 01/28/2025 08:24 AM    MCH 27.6 01/28/2025 08:24 AM    RDW 14.6 01/28/2025 08:24 AM    Platelets 264 01/28/2025 08:24 AM    Segmented % 57 01/28/2025 08:24 AM    Lymphocytes % 27 01/28/2025 08:24 AM    Monocytes % 9 01/28/2025 08:24 AM    Eosinophils Relative 6 01/28/2025 08:24 AM    Basophils Relative 1 01/28/2025 08:24 AM    Immature Grans  % 0 01/28/2025 08:24 AM    Absolute Neutrophils 3.47 01/28/2025 08:24 AM     Lab Results   Component Value Date/Time    Potassium 4.3 01/28/2025 08:24 AM    Chloride 103 01/28/2025 08:24 AM    CO2 30 01/28/2025 08:24 AM    BUN 30 (H) 01/28/2025 08:24 AM    Creatinine 0.61 01/28/2025 08:24 AM    Glucose, Fasting 97 01/28/2025 08:24 AM    Calcium 9.1 01/28/2025 08:24 AM    AST 15 01/28/2025 08:24 AM    ALT 14 01/28/2025 08:24 AM    Alkaline Phosphatase 95 01/28/2025 08:24 AM    Total Protein 6.7 01/28/2025 08:24 AM    Albumin 4.2 01/28/2025 08:24 AM    Total Bilirubin 0.43 01/28/2025 08:24 AM    eGFR 93 01/28/2025 08:24 AM       Test Result Released: Yes (seen)    0 Result Notes        Component  Ref Range & Units (hover) 1/28/25  8:24 AM 5/6/24  6:25 AM 1/23/24  9:33 AM   CA 27-29 54.4 High  49.8 High  CM 49.9 High  CM   Comment: [employer] Immunochemiluminometric Methodology (ICMA)  Values obtained with different assay methods or kits cannot be used  interchangeably. Results cannot be interpreted as absolute evidence  of the presence or absence of malignant disease.           Maximum CA 27-29 has been 60.      [facility]  Outside Information  Results  Mammo screening left w 3d and cad (Order [id number])     Mammo screening left w 3d and cad  Order: [id number]  Impression    Impression:    There is no mammographic evidence of malignancy in the left breast.      BI-RADS Category:  2 - Benign    Return to Screening is recommended.    Lifetime Tyrer-Cuzick 8: Tyrer-Cuzick Model is not applicable when the  patient has breast cancer.    In patients with a documented history of breast cancer, male patients,  patients legal sex is unknown or with an age less than 19 or 85 and  greater a risk score will not be calculated. Based on the information  provided by the patient, risk scores for breast cancer for 5 years, 10  years and lifetime was calculated using both breast density and family  history.   Patients should consult with their referring providers regarding  the significance of the score, potential need for additional risk  assessment and supplemental screening including whole breast ultrasound  and MRI    MRN: [id number]    Patient Name: Clover Martin    This exam was performed at:  [facility] [address], [address]  [place] [address]  [phone]    Workstation: [id number]  Narrative    HISTORY:  Patient is 69 years old and is seen for a screening mammogram of the left  breast  performed on 1/14/25.    Medical history includes breast cancer, BRCA1 Negative, and BRCA2  Negative.  Surgical history includes breast reduction, mastectomy, and hysterectomy.  No relevant family history has been documented for this patient.  No relevant hormone history has been documented for this patient.    FILMS COMPARED:  The present examination has been compared to prior imaging studies.    MAMMOGRAM FINDINGS:  A minimum of two views were obtained. 3D tomosynthesis was performed.  Computer-aided detection was utilized by the radiologist in the  interpretation of this examination.    There are scattered areas of fibroglandular density.    The patient is status post right mastectomy.  No suspicious masses,  calcifications or other abnormalities are seen.  Stable post reduction  mammoplasty changes are identified in the left breast.  Exam End: 01/14/25 11:09 AM Last Resulted: 01/16/25  8:28 AM   Received From: [facility]  Result Received: 01/28/25  8:22 AM   US thyroid  Status: Final result     PACS Images     Show images for US thyroid  Study Result    Narrative & Impression   THYROID ULTRASOUND     INDICATION: E04.1: Nontoxic single thyroid nodule.     COMPARISON: Multiple prior thyroid ultrasounds with the most recent being obtained 8/21/2023     TECHNIQUE: Ultrasound of the thyroid was performed with a high frequency linear transducer in transverse and  sagittal planes including volumetric imaging sweeps as well as traditional still imaging technique.     FINDINGS:  Thyroid texture: Thyroid gland is enlarged with mildly heterogeneous parenchymal echotexture     Right lobe: 9.1 x 3.8 x 4.7 cm. Volume 79.2 mL  Left lobe: 7.4 x 3.2 x 2.0 cm. Volume 22.9 mL  Isthmus: 0.5 cm.     Nodule #1. Image 31.  RIGHT lower pole nodule measuring 2.1 x 0.9 x 1.3 cm.  Given differences in measuring technique, no significant change from prior.  COMPOSITION: 2 points, could not be determined due to calcification.  ECHOGENICITY: 1 point, echogenicity cannot be determined.  SHAPE: 0 points, wider-than-tall.  MARGIN: 0 points, smooth.  ECHOGENIC FOCI: 2 points, peripheral(rim) calcifications.  TI-RADS Classification: TR 4 (4-6 points), FNA if > 1.5 cm. Follow if > 1cm.     Nodule #2. Image 61.  Left isthmus measuring 1.5 x 0.4 x 1.4 cm.  Given differences in measuring technique, no significant change from prior.  COMPOSITION: 2 points, solid or almost completely solid.  ECHOGENICITY: 2 points, hypoechoic.  SHAPE: 0 points, wider-than-tall.  MARGIN: 0 points, smooth.  ECHOGENIC FOCI: 0 points, none or large comet-tail artifacts.  TI-RADS Classification: TR 4 (4-6 points), FNA if > 1.5 cm. Follow if > 1cm.     Nodule #3. Image 79.  LEFT upper pole nodule measuring 1.4 x 1.1 x 1.2 cm.  This nodule has decreased in size from prior.  COMPOSITION: 2 points, solid or almost completely solid.  ECHOGENICITY: 1 point, hyperechoic or isoechoic.  SHAPE: 0 points, wider-than-tall.  MARGIN: 0 points, ill-defined.  ECHOGENIC FOCI: 0 points, none or large comet-tail artifacts.  TI-RADS Classification: TR 3 (3 points), FNA if >2.5 cm.  Follow if >1.5 cm.     Nodule #4. Image 96.  LEFT midgland nodule measuring 0.6 x 0.5 x 0.7 cm.  Given differences in measuring technique, no significant change from prior.  COMPOSITION: 2 points, solid or almost completely solid.  ECHOGENICITY: 2 points,  hypoechoic.  SHAPE: 0 points, wider-than-tall.  MARGIN: 0 points, smooth.  ECHOGENIC FOCI: 0 points, none or large comet-tail artifacts.  TI-RADS Classification: TR 4 (4-6 points), FNA if > 1.5 cm. Follow if > 1cm.     There are additional nodules of lesser size and/or TI-RADS score.  At the time of follow-up for the above described dominant nodules, these will be reevaluated in detailed at that time if needed.     IMPRESSION:     1. Redemonstration of multiple thyroid nodules as listed above, stable from most recent thyroid ultrasound of August 2023. Left isthmus nodule (nodule #2, image 61) meets ACR criteria for biopsy, however, follow-up ultrasound in 1 year may be considered   in lieu of biopsy given that the nodule has been present across multiple prior exams and is not appreciably changed dating back to August 2022.     2. Stable peripherally calcified nodule in the right thyroid lobe (nodule #1). While this nodule does technically meet ACR criteria for biopsy, continued annual follow-up with ultrasound is recommended given that the nodule is stable across numerous   prior exams.     Reference: ACR Thyroid Imaging, Reporting and Data System (TI-RADS): White Paper of the ACR TI-RADS Committee. J AM Geremias Radiol 2017;14:587-595. Additional recommendations based on American Thyroid Association 2015 guidelines.        Workstation performed: [id number]        Imaging    US thyroid (Order: [id number]) - 8/22/2024    Result History    US thyroid (Order #[id number]) on 8/26/2024 - Order Result History Report

## 2025-02-10 DIAGNOSIS — E04.1 THYROID NODULE: Primary | ICD-10-CM

## 2025-02-11 ENCOUNTER — TELEPHONE (OUTPATIENT)
Dept: SURGICAL ONCOLOGY | Facility: CLINIC | Age: 70
End: 2025-02-11

## 2025-02-11 NOTE — TELEPHONE ENCOUNTER
Spoke with pt regarding her need to cancel appt on 4/14/25 and offered to reschedule.  Pt stated that she will call back to do that.  I confirmed that Pt has the phone number to call and she stated she did.

## 2025-02-26 ENCOUNTER — APPOINTMENT (OUTPATIENT)
Dept: LAB | Facility: AMBULARY SURGERY CENTER | Age: 70
End: 2025-02-26
Payer: MEDICARE

## 2025-02-26 DIAGNOSIS — R97.8 ABNORMAL TUMOR MARKERS: ICD-10-CM

## 2025-02-26 DIAGNOSIS — C50.911 MALIGNANT NEOPLASM OF RIGHT BREAST IN FEMALE, ESTROGEN RECEPTOR NEGATIVE, UNSPECIFIED SITE OF BREAST (HCC): ICD-10-CM

## 2025-02-26 DIAGNOSIS — Z17.1 MALIGNANT NEOPLASM OF RIGHT BREAST IN FEMALE, ESTROGEN RECEPTOR NEGATIVE, UNSPECIFIED SITE OF BREAST (HCC): ICD-10-CM

## 2025-02-26 DIAGNOSIS — I10 ESSENTIAL HYPERTENSION, MALIGNANT: ICD-10-CM

## 2025-02-26 DIAGNOSIS — E78.49 FAMILIAL COMBINED HYPERLIPIDEMIA: ICD-10-CM

## 2025-02-26 LAB
ALBUMIN SERPL BCG-MCNC: 3.7 G/DL (ref 3.5–5)
ALP SERPL-CCNC: 84 U/L (ref 34–104)
ALT SERPL W P-5'-P-CCNC: 15 U/L (ref 7–52)
ANION GAP SERPL CALCULATED.3IONS-SCNC: 10 MMOL/L (ref 4–13)
AST SERPL W P-5'-P-CCNC: 18 U/L (ref 13–39)
BASOPHILS # BLD AUTO: 0.02 THOUSANDS/ÂΜL (ref 0–0.1)
BASOPHILS NFR BLD AUTO: 0 % (ref 0–1)
BILIRUB SERPL-MCNC: 0.23 MG/DL (ref 0.2–1)
BUN SERPL-MCNC: 19 MG/DL (ref 5–25)
CALCIUM SERPL-MCNC: 8.8 MG/DL (ref 8.4–10.2)
CHLORIDE SERPL-SCNC: 101 MMOL/L (ref 96–108)
CHOLEST SERPL-MCNC: 119 MG/DL (ref ?–200)
CO2 SERPL-SCNC: 29 MMOL/L (ref 21–32)
CREAT SERPL-MCNC: 0.61 MG/DL (ref 0.6–1.3)
EOSINOPHIL # BLD AUTO: 0.32 THOUSAND/ÂΜL (ref 0–0.61)
EOSINOPHIL NFR BLD AUTO: 7 % (ref 0–6)
ERYTHROCYTE [DISTWIDTH] IN BLOOD BY AUTOMATED COUNT: 14.1 % (ref 11.6–15.1)
GFR SERPL CREATININE-BSD FRML MDRD: 93 ML/MIN/1.73SQ M
GLUCOSE P FAST SERPL-MCNC: 97 MG/DL (ref 65–99)
HCT VFR BLD AUTO: 39.6 % (ref 34.8–46.1)
HDLC SERPL-MCNC: 37 MG/DL
HGB BLD-MCNC: 12.6 G/DL (ref 11.5–15.4)
IMM GRANULOCYTES # BLD AUTO: 0.01 THOUSAND/UL (ref 0–0.2)
IMM GRANULOCYTES NFR BLD AUTO: 0 % (ref 0–2)
LDLC SERPL CALC-MCNC: 56 MG/DL (ref 0–100)
LYMPHOCYTES # BLD AUTO: 1.48 THOUSANDS/ÂΜL (ref 0.6–4.47)
LYMPHOCYTES NFR BLD AUTO: 32 % (ref 14–44)
MCH RBC QN AUTO: 27.6 PG (ref 26.8–34.3)
MCHC RBC AUTO-ENTMCNC: 31.8 G/DL (ref 31.4–37.4)
MCV RBC AUTO: 87 FL (ref 82–98)
MONOCYTES # BLD AUTO: 0.39 THOUSAND/ÂΜL (ref 0.17–1.22)
MONOCYTES NFR BLD AUTO: 8 % (ref 4–12)
NEUTROPHILS # BLD AUTO: 2.48 THOUSANDS/ÂΜL (ref 1.85–7.62)
NEUTS SEG NFR BLD AUTO: 53 % (ref 43–75)
NONHDLC SERPL-MCNC: 82 MG/DL
NRBC BLD AUTO-RTO: 0 /100 WBCS
PLATELET # BLD AUTO: 227 THOUSANDS/UL (ref 149–390)
PMV BLD AUTO: 11.3 FL (ref 8.9–12.7)
POTASSIUM SERPL-SCNC: 3.9 MMOL/L (ref 3.5–5.3)
PROT SERPL-MCNC: 6.4 G/DL (ref 6.4–8.4)
RBC # BLD AUTO: 4.56 MILLION/UL (ref 3.81–5.12)
SODIUM SERPL-SCNC: 140 MMOL/L (ref 135–147)
TRIGL SERPL-MCNC: 129 MG/DL (ref ?–150)
TSH SERPL DL<=0.05 MIU/L-ACNC: 5.64 UIU/ML (ref 0.45–4.5)
WBC # BLD AUTO: 4.7 THOUSAND/UL (ref 4.31–10.16)

## 2025-02-26 PROCEDURE — 85025 COMPLETE CBC W/AUTO DIFF WBC: CPT

## 2025-02-26 PROCEDURE — 80061 LIPID PANEL: CPT

## 2025-02-26 PROCEDURE — 86300 IMMUNOASSAY TUMOR CA 15-3: CPT

## 2025-02-26 PROCEDURE — 80053 COMPREHEN METABOLIC PANEL: CPT

## 2025-02-26 PROCEDURE — 36415 COLL VENOUS BLD VENIPUNCTURE: CPT

## 2025-02-26 PROCEDURE — 84443 ASSAY THYROID STIM HORMONE: CPT

## 2025-02-27 LAB — CANCER AG27-29 SERPL-ACNC: 52 U/ML (ref 0–38.6)

## 2025-07-31 ENCOUNTER — TELEPHONE (OUTPATIENT)
Age: 70
End: 2025-07-31

## (undated) DEVICE — SCD SEQUENTIAL COMPRESSION COMFORT SLEEVE MEDIUM KNEE LENGTH: Brand: KENDALL SCD

## (undated) DEVICE — PAD CAST 4 IN COTTON NON STERILE

## (undated) DEVICE — SYRINGE 5ML LL

## (undated) DEVICE — CHLORAPREP HI-LITE 26ML ORANGE

## (undated) DEVICE — SUT ETHILON 4-0 PS-2 18 IN 1667H

## (undated) DEVICE — POV-IOD SOLUTION 4OZ BT

## (undated) DEVICE — SUT VICRYL 3-0 PS-2 27 IN J427H

## (undated) DEVICE — GLOVE PI ULTRA TOUCH SZ.8.0

## (undated) DEVICE — PENCIL ELECTROSURG E-Z CLEAN -0035H

## (undated) DEVICE — 2000CC GUARDIAN II: Brand: GUARDIAN

## (undated) DEVICE — Device

## (undated) DEVICE — BETHLEHEM UNIVERSAL  MIONR EXT: Brand: CARDINAL HEALTH

## (undated) DEVICE — STRETCH BANDAGE: Brand: CURITY

## (undated) DEVICE — CUFF TOURNIQUET 18 X 4 IN QUICK CONNECT DISP 1 BLADDER

## (undated) DEVICE — CURITY NON-ADHERENT STRIPS: Brand: CURITY

## (undated) DEVICE — SUT VICRYL 4-0 PS-2 27 IN J426H

## (undated) DEVICE — TUBING SUCTION 5MM X 12 FT

## (undated) DEVICE — STOCKINETTE REGULAR

## (undated) DEVICE — NEEDLE 25G X 1 1/2

## (undated) DEVICE — NEEDLE 18 G X 1 1/2

## (undated) DEVICE — 22.5 MM X 6.9 MM X 0.53 MM SAGITTAL BLADE

## (undated) DEVICE — GLOVE INDICATOR PI UNDERGLOVE SZ 7 BLUE

## (undated) DEVICE — GLOVE INDICATOR PI UNDERGLOVE SZ 7.5 BLUE

## (undated) DEVICE — INTENDED FOR TISSUE SEPARATION, AND OTHER PROCEDURES THAT REQUIRE A SHARP SURGICAL BLADE TO PUNCTURE OR CUT.: Brand: BARD-PARKER ® CARBON RIB-BACK BLADES

## (undated) DEVICE — 10FR FRAZIER SUCTION HANDLE: Brand: CARDINAL HEALTH

## (undated) DEVICE — CAST PADDING 4 IN SYNTHETIC NON-STRL